# Patient Record
Sex: MALE | Race: WHITE | NOT HISPANIC OR LATINO | Employment: OTHER | URBAN - METROPOLITAN AREA
[De-identification: names, ages, dates, MRNs, and addresses within clinical notes are randomized per-mention and may not be internally consistent; named-entity substitution may affect disease eponyms.]

---

## 2017-01-03 ENCOUNTER — OFFICE VISIT (OUTPATIENT)
Dept: INTERNAL MEDICINE | Facility: CLINIC | Age: 72
End: 2017-01-03
Payer: MEDICARE

## 2017-01-03 VITALS
DIASTOLIC BLOOD PRESSURE: 68 MMHG | BODY MASS INDEX: 28.69 KG/M2 | HEIGHT: 73 IN | HEART RATE: 76 BPM | TEMPERATURE: 98 F | WEIGHT: 216.5 LBS | OXYGEN SATURATION: 98 % | SYSTOLIC BLOOD PRESSURE: 111 MMHG

## 2017-01-03 DIAGNOSIS — J40 BRONCHITIS: Primary | ICD-10-CM

## 2017-01-03 DIAGNOSIS — Z88.0 HISTORY OF PENICILLIN ALLERGY: ICD-10-CM

## 2017-01-03 DIAGNOSIS — F32.A DEPRESSION, UNSPECIFIED DEPRESSION TYPE: ICD-10-CM

## 2017-01-03 PROCEDURE — 99999 PR PBB SHADOW E&M-EST. PATIENT-LVL IV: CPT | Mod: PBBFAC,,, | Performed by: INTERNAL MEDICINE

## 2017-01-03 PROCEDURE — 99214 OFFICE O/P EST MOD 30 MIN: CPT | Mod: PBBFAC | Performed by: INTERNAL MEDICINE

## 2017-01-03 PROCEDURE — 99214 OFFICE O/P EST MOD 30 MIN: CPT | Mod: S$PBB,,, | Performed by: INTERNAL MEDICINE

## 2017-01-03 RX ORDER — DOXYCYCLINE HYCLATE 100 MG
100 TABLET ORAL 2 TIMES DAILY
Qty: 14 TABLET | Refills: 0 | Status: SHIPPED | OUTPATIENT
Start: 2017-01-03 | End: 2017-01-10

## 2017-01-03 NOTE — PROGRESS NOTES
Subjective:       Patient ID: Peter Caldwell is a 71 y.o. male.    Chief Complaint: URI    HPI  70 y/o here for urgent visit for sinus/cough.    URI - Symptoms started a little over a week ago with congestion, rhinorrhea, sore throat which progressed to productive cough (yellow/green sputum). Feeling fatigued. Had started feeling better 3-4 days ago, but now feeling worse again; feeling more easily winded with exertion but not otherwise short of breath. Subjective fever at onset but no definite fevers since then. Right-sided sinus pressure but not pain; no ear pain or hearing.   Using flonase twice daily. Taking guaifenesin-DM, mucinex, ibuprofen + pseudoephedrine, ibuprofen.     Does have HTN, checks BP at home. No chest pain or dyspnea at rest.    Feeling depressed some recently. Has struggled with this in the past; has seen a psychiatrist in the past and was started on a medication (years ago) but stopped this due to rash -- unclear if rash was incidental or due to medication. Doesn't recall which. Had been feeling good over holiday but has had work stress. Planning to start exercising more in the new year.    Also asks about history of penicillin allergy; says he used to get PCN shots occasionally as a child and once developed mild rash, and that he was then told he had an allergy and shouldn't get this again.     Review of Systems   Constitutional: Positive for fatigue. Negative for fever.   HENT:        As noted in HPI   Respiratory: Positive for cough. Negative for shortness of breath and wheezing.    Cardiovascular: Negative for chest pain and leg swelling.   Gastrointestinal: Negative for abdominal pain, diarrhea and nausea.   Genitourinary: Negative.    Musculoskeletal: Negative for joint swelling and myalgias.   Skin: Negative for rash.   Neurological: Negative for headaches.   Psychiatric/Behavioral: Positive for dysphoric mood. Negative for suicidal ideas. The patient is not nervous/anxious.       "    Past medical history, surgical history, and family medical history reviewed and updated as appropriate.    Medications and allergies reviewed.     Objective:          Vitals:    01/03/17 1634   BP: 111/68   BP Location: Left arm   Patient Position: Sitting   Pulse: 76   Temp: 97.7 °F (36.5 °C)   TempSrc: Oral   SpO2: 98%   Weight: 98.2 kg (216 lb 7.9 oz)   Height: 6' 1" (1.854 m)     Body mass index is 28.56 kg/(m^2).  Physical Exam   Constitutional: He is oriented to person, place, and time. He appears well-developed and well-nourished. No distress.   HENT:   Head: Normocephalic and atraumatic.   Right Ear: Tympanic membrane, external ear and ear canal normal.   Left Ear: Tympanic membrane, external ear and ear canal normal.   Nose: Mucosal edema (and erythema of nasal turbinates) present. Right sinus exhibits no maxillary sinus tenderness and no frontal sinus tenderness. Left sinus exhibits no maxillary sinus tenderness and no frontal sinus tenderness.   Mouth/Throat: Mucous membranes are normal. Posterior oropharyngeal erythema (mild erythema) present. No oropharyngeal exudate. No tonsillar exudate.   Eyes: Conjunctivae and EOM are normal. Pupils are equal, round, and reactive to light.   Neck: Normal range of motion. Neck supple.   Cardiovascular: Normal rate, regular rhythm and normal heart sounds.    No murmur heard.  Pulmonary/Chest: Effort normal and breath sounds normal. No respiratory distress. He has no wheezes.   Coarse breath sounds in mid-lung bilaterally but no clear rhonchi, wheezes, or rales. Good air movement throughout, no egophony   Abdominal: Soft. Bowel sounds are normal. He exhibits no distension. There is no tenderness.   Musculoskeletal: He exhibits no edema or tenderness.   Lymphadenopathy:     He has no cervical adenopathy.   Neurological: He is alert and oriented to person, place, and time. No cranial nerve deficit. Gait normal.   Skin: Skin is warm and dry.   Psychiatric: He has a " normal mood and affect.   Vitals reviewed.      Lab Results   Component Value Date    WBC 6.15 06/02/2009    HGB 15.7 06/02/2009    HCT 46.0 06/02/2009     06/02/2009    CHOL 121 02/08/2016    TRIG 77 02/08/2016    HDL 44 02/08/2016    ALT 22 07/13/2016    AST 19 07/13/2016     07/13/2016    K 4.5 07/13/2016     07/13/2016    CREATININE 1.1 07/13/2016    BUN 15 07/13/2016    CO2 25 07/13/2016    TSH 2.1 05/13/2005    PSA 4.3 (H) 07/13/2016    INR 1.0 06/02/2009       Assessment:       1. Bronchitis    2. History of penicillin allergy    3. Depression, unspecified depression type        Plan:   Peter was seen today for uri.    Diagnoses and all orders for this visit:    Bronchitis - given duration of symptoms with worsening after improvement and predominant productive cough but overall normal lung exam, will treat with antibiotics for acute bronchitis, but CXR not currently needed. Also Recommended: saline nasal spray BID-TID, steroid nasal spray BID x 1 week then daily, antihistamine daily, mucinex-DM with plenty of fluids, lots of hot tea / soup, lots of sleep. Contact clinic or RTC for worsening symptoms, or symptoms that have not improved significantly after course of antibiotics   -     doxycycline (VIBRA-TABS) 100 MG tablet; Take 1 tablet (100 mg total) by mouth 2 (two) times daily.    History of penicillin allergy - discussed options; given history, he would like to see Allergy for testing for PCN allergy  -     Ambulatory Referral to Allergy    Depression, unspecified depression type - patient education given for self-care and crisis line; recommended follow up with PCP on this or seek counseling support. He is looking into establishing care with new PCP as he has moved to [place] and wants to have a provider closer.     BP normal today; cautioned to watch BP carefully if taking NSAIDs or decongestants.    Return if symptoms worsen or fail to improve.    Jose Anderson MD  Internal  Medicine [facility]  1/3/2017

## 2017-01-03 NOTE — Clinical Note
I saw Peter Caldwell today for urgent visit for URI/bronchitis, also discussed depression and penicillin allergy - see note for details, please contact me if any questions.  Thanks, Jose Anderson MD

## 2017-01-03 NOTE — PATIENT INSTRUCTIONS
Acute Bronchitis  Your healthcare provider has told you that you have acute bronchitis. Bronchitis is infection or inflammation of the bronchial tubes (airways in the lungs). Normally, air moves easily in and out of the airways. Bronchitis narrows the airways, making it harder for air to flow in and out of the lungs. This causes symptoms such as shortness of breath, coughing, and wheezing. Bronchitis can be acute or chronic. Acute means the condition comes on quickly and goes away in a short time. Chronic means a condition lasts a long time and often comes back. Read on to learn more about acute bronchitis.    What causes acute bronchitis?  Acute bronchitis almost always starts as a viral respiratory infection, such as a cold or the flu. Certain factors make it more likely for a cold or flu to turn into bronchitis. These include being very young or very old or having a heart or lung problem. Cigarette smoking also makes bronchitis more likely.  When bronchitis develops, the airways become swollen. The airways may also become infected with bacteria. This is known as a secondary infection.  Diagnosing acute bronchitis  Your healthcare provider will examine you and ask about your symptoms and health history. You may also have a sputum culture to test the fluid in your lungs. Chest X-rays may be done to look for infection in the lungs.  Treating acute bronchitis  Bronchitis usually clears up as the cold or flu goes away. You can help feel better faster by doing the following:  · Take medicine as directed. You may be told to take ibuprofen or other over-the-counter medicines. These help relieve inflammation in your bronchial tubes. Your doctor may prescribe an inhaler to help open up the bronchial tubes. Most of the time, acute bronchitis is caused by a viral infection. Antibiotics are usually not prescribed for viral infections.  · Drink plenty of fluids, such as water, juice, or warm soup. Fluids loosen mucus so  that you can cough it up. This helps you breathe more easily. Fluids also prevent dehydration.  · Make sure you get plenty of rest.  · Do not smoke. Do not allow anyone else to smoke in your home.  Recovery and follow-up  Follow up with your doctor as you are told. You will likely feel better in a week or two. But a dry cough can linger beyond that time. Let your doctor know if you still have symptoms (other than a dry cough) after 2 weeks. If youre prone to getting bronchial infections, let your doctor know. And take steps to protect yourself from future infections. These steps include stopping smoking and avoiding tobacco smoke, washing your hands often, and getting a yearly flu shot.  When to call the doctor  Call the doctor if you have any of the following:  · Fever of 100.4°F (38.0°C) higher  · Symptoms that get worse, or new symptoms  · Trouble breathing  · Symptoms that dont start to improve within a week, or within 3 days of taking antibiotics   © 6292-3205 [employer]. [address] [place] [address]. All rights reserved. This information is not intended as a substitute for professional medical care. Always follow your healthcare professional's instructions.        Know the Signs and Symptoms of Depression  Everyone feels down at times. The blues are a natural part of life. But an unhappy period thats intense or lasts for more than a couple of weeks can be a sign of depression. Depression is a serious illness. It can disrupt the lives of family and friends. If you know someone you think may be depressed, find out what you can do to help.    Recognizing signs of depression  People who are depressed may:  · Feel unhappy, sad, blue, down, or miserable nearly every day  · Feel helpless, hopeless, or worthless  · Lose interest in hobbies, friends, and activities that used to give pleasure  · Not sleep well or sleep too much  · Gain or lose weight  · Feel low on energy or constantly  tired  · Have a hard time concentrating or making decisions  · Lose interest in sex  · Have physical symptoms, such as stomachaches, headaches, or backaches  Know the serious signals  Warning signals for suicide include:  · Threats or talk of suicide  · Statements such as I wont be a problem much longer or Nothing matters  · Giving away possessions or making a will or  arrangements  · Buying a gun or other weapon  · Sudden, unexplained cheerfulness or calm after a period of depression  If you notice any of these signs, get help right away. Call a health care professional, mental health clinic, or suicide hotline and ask what action to take. In an emergency, dont hesitate to call the police.  Resources:  · National Institutes of Mental Xeluqv213-951-1830mav.nimh.nih.gov  · National [place] on Mental Zqdqgvp266-926-1484uyc.edmond.org   · Mental Health Qlgpcet251-995-4423kii.nmha.org  · National Suicide Gmlijkq717-537-3405 (800-SUICIDE)  · National Suicide Prevention Onxriiin435-533-4288 (725-248-HSUD)www.wuicidepreventionlifeline.org   © 7719-6029 [employer]. [address] [place] [address], [place] [address]. All rights reserved. This information is not intended as a substitute for professional medical care. Always follow your healthcare professional's instructions.

## 2017-01-03 NOTE — MR AVS SNAPSHOT
[facility]  [address]  [facility] [address]  Phone: [phone]  Fax: [phone]                  Peter Caldwell   1/3/2017 4:20 PM   Office Visit    Description:  Male : 1945   Provider:  Jose Anderson MD   Department:  [facility]           Reason for Visit     URI           Diagnoses this Visit        Comments    Bronchitis    -  Primary     History of penicillin allergy         Depression, unspecified depression type                To Do List           Future Appointments        Provider Department Dept Phone    4/10/2017 8:45 AM [facility] 11 ALL [facility] [phone]      Goals (5 Years of Data)     None      Follow-Up and Disposition     Return if symptoms worsen or fail to improve.       These Medications        Disp Refills Start End    doxycycline (VIBRA-TABS) 100 MG tablet 14 tablet 0 1/3/2017 1/10/2017    Take 1 tablet (100 mg total) by mouth 2 (two) times daily. - Oral    [facility] [address] [place] [address] AT [facility] Ph #: [phone]         [facility]     [facility] -  Assistance  Registered nurses in the [facility] provide clinical advisement, health education, appointment booking, and other advisory services.  Call for this free service at [phone].             Medications           Message regarding Medications     Verify the changes and/or additions to your medication regime listed below are the same as discussed with your clinician today.  If any of these changes or additions are incorrect, please notify your healthcare provider.        START taking these NEW medications        Refills    doxycycline (VIBRA-TABS) 100 MG tablet 0    Sig: Take 1 tablet (100 mg total) by mouth 2 (two) times daily.    Class: Normal    Route: Oral           Verify that the below list of medications  "is an accurate representation of the medications you are currently taking.  If none reported, the list may be blank. If incorrect, please contact your healthcare provider. Carry this list with you in case of emergency.           Current Medications     aspirin (ECOTRIN) 81 MG EC tablet Take 81 mg by mouth every evening.     atorvastatin (LIPITOR) 80 MG tablet Take 80 mg by mouth every evening.    CALCIUM CITRATE/VITAMIN D3 (CALCIUM CITRATE + D ORAL) Take 1 tablet by mouth once daily.    fluticasone (FLONASE) 50 mcg/actuation nasal spray SHAKE WELL AND USE 1 SPRAY IN EACH NOSTRIL EVERY DAY    metoprolol succinate (TOPROL-XL) 50 MG 24 hr tablet Take 1 tablet (50 mg total) by mouth nightly.    metoprolol succinate (TOPROL-XL) 50 MG 24 hr tablet TAKE ONE TABLET BY MOUTH EVERY EVENING    MULTIVIT-MINERALS/FA/LYCOPENE (ONE-A-DAY MEN'S MULTIVITAMIN ORAL) Take by mouth.    tamsulosin (FLOMAX) 0.4 mg Cp24 TAKE 1 CAPSULE BY MOUTH EVERY DAY    doxycycline (VIBRA-TABS) 100 MG tablet Take 1 tablet (100 mg total) by mouth 2 (two) times daily.           Clinical Reference Information           Vital Signs - Last Recorded  Most recent update: 1/3/2017  4:40 PM by Tea Mata MA    BP Pulse Temp Ht Wt SpO2    111/68 (BP Location: Left arm, Patient Position: Sitting) 76 97.7 °F (36.5 °C) (Oral) 6' 1" (1.854 m) 98.2 kg (216 lb 7.9 oz) 98%    BMI                28.56 kg/m2          Blood Pressure          Most Recent Value    BP  111/68      Allergies as of 1/3/2017     Penicillins    Promethazine    Percodan [Oxycodone Hcl-oxycodone-asa]      Immunizations Administered on Date of Encounter - 1/3/2017     None      Orders Placed During Today's Visit      Normal Orders This Visit    Ambulatory Referral to Allergy       Instructions      Acute Bronchitis  Your healthcare provider has told you that you have acute bronchitis. Bronchitis is infection or inflammation of the bronchial tubes (airways in the lungs). Normally, air moves " easily in and out of the airways. Bronchitis narrows the airways, making it harder for air to flow in and out of the lungs. This causes symptoms such as shortness of breath, coughing, and wheezing. Bronchitis can be acute or chronic. Acute means the condition comes on quickly and goes away in a short time. Chronic means a condition lasts a long time and often comes back. Read on to learn more about acute bronchitis.    What causes acute bronchitis?  Acute bronchitis almost always starts as a viral respiratory infection, such as a cold or the flu. Certain factors make it more likely for a cold or flu to turn into bronchitis. These include being very young or very old or having a heart or lung problem. Cigarette smoking also makes bronchitis more likely.  When bronchitis develops, the airways become swollen. The airways may also become infected with bacteria. This is known as a secondary infection.  Diagnosing acute bronchitis  Your healthcare provider will examine you and ask about your symptoms and health history. You may also have a sputum culture to test the fluid in your lungs. Chest X-rays may be done to look for infection in the lungs.  Treating acute bronchitis  Bronchitis usually clears up as the cold or flu goes away. You can help feel better faster by doing the following:  · Take medicine as directed. You may be told to take ibuprofen or other over-the-counter medicines. These help relieve inflammation in your bronchial tubes. Your doctor may prescribe an inhaler to help open up the bronchial tubes. Most of the time, acute bronchitis is caused by a viral infection. Antibiotics are usually not prescribed for viral infections.  · Drink plenty of fluids, such as water, juice, or warm soup. Fluids loosen mucus so that you can cough it up. This helps you breathe more easily. Fluids also prevent dehydration.  · Make sure you get plenty of rest.  · Do not smoke. Do not allow anyone else to smoke in your  home.  Recovery and follow-up  Follow up with your doctor as you are told. You will likely feel better in a week or two. But a dry cough can linger beyond that time. Let your doctor know if you still have symptoms (other than a dry cough) after 2 weeks. If youre prone to getting bronchial infections, let your doctor know. And take steps to protect yourself from future infections. These steps include stopping smoking and avoiding tobacco smoke, washing your hands often, and getting a yearly flu shot.  When to call the doctor  Call the doctor if you have any of the following:  · Fever of 100.4°F (38.0°C) higher  · Symptoms that get worse, or new symptoms  · Trouble breathing  · Symptoms that dont start to improve within a week, or within 3 days of taking antibiotics   © 7515-9082 [employer]. [address] [place] [address]. All rights reserved. This information is not intended as a substitute for professional medical care. Always follow your healthcare professional's instructions.        Know the Signs and Symptoms of Depression  Everyone feels down at times. The blues are a natural part of life. But an unhappy period thats intense or lasts for more than a couple of weeks can be a sign of depression. Depression is a serious illness. It can disrupt the lives of family and friends. If you know someone you think may be depressed, find out what you can do to help.    Recognizing signs of depression  People who are depressed may:  · Feel unhappy, sad, blue, down, or miserable nearly every day  · Feel helpless, hopeless, or worthless  · Lose interest in hobbies, friends, and activities that used to give pleasure  · Not sleep well or sleep too much  · Gain or lose weight  · Feel low on energy or constantly tired  · Have a hard time concentrating or making decisions  · Lose interest in sex  · Have physical symptoms, such as stomachaches, headaches, or backaches  Know the serious signals  Warning  signals for suicide include:  · Threats or talk of suicide  · Statements such as I wont be a problem much longer or Nothing matters  · Giving away possessions or making a will or  arrangements  · Buying a gun or other weapon  · Sudden, unexplained cheerfulness or calm after a period of depression  If you notice any of these signs, get help right away. Call a health care professional, mental health clinic, or suicide hotline and ask what action to take. In an emergency, dont hesitate to call the police.  Resources:  · National Institutes of Mental Cpacqk599-643-0401krf.[facility].nih.gov  · National [place] on Mental Qqbcqqw303-756-2007aim.[place].org   · Mental Health Oefeuft592-977-4257lpu.nmha.org  · National Suicide Mslavaf404-972-2664 (800-SUICIDE)  · National Suicide Prevention Bvesraot408-478-6291 (486-705-ZWBI)www.Cold[employer] Solutionsicidepreventionlifeline.org   © 5165-8944 [employer]. [address] [place] [address], [place] [address]. All rights reserved. This information is not intended as a substitute for professional medical care. Always follow your healthcare professional's instructions.

## 2017-04-06 ENCOUNTER — TELEPHONE (OUTPATIENT)
Dept: UROLOGY | Facility: CLINIC | Age: 72
End: 2017-04-06

## 2017-04-06 DIAGNOSIS — R97.20 ELEVATED PSA: Primary | ICD-10-CM

## 2017-04-06 NOTE — TELEPHONE ENCOUNTER
----- Message from Armando Miranda sent at 4/6/2017  4:34 PM CDT -----  Contact: pt  Pt is requesting orders for PSA test.      Pt can be reach at [phone]. Or send to his [facility].

## 2017-04-06 NOTE — TELEPHONE ENCOUNTER
Left message to call back so I can advised him that I booked psa lab test for him on his appt date.

## 2017-04-18 ENCOUNTER — HOSPITAL ENCOUNTER (OUTPATIENT)
Dept: RADIOLOGY | Facility: HOSPITAL | Age: 72
Discharge: HOME | End: 2017-04-18
Attending: FAMILY MEDICINE
Payer: MEDICARE

## 2017-04-18 ENCOUNTER — OFFICE VISIT (OUTPATIENT)
Dept: UROLOGY | Facility: CLINIC | Age: 72
End: 2017-04-18
Payer: MEDICARE

## 2017-04-18 VITALS
DIASTOLIC BLOOD PRESSURE: 72 MMHG | SYSTOLIC BLOOD PRESSURE: 133 MMHG | WEIGHT: 216.5 LBS | HEIGHT: 73 IN | BODY MASS INDEX: 28.69 KG/M2 | HEART RATE: 50 BPM

## 2017-04-18 DIAGNOSIS — R97.20 ELEVATED PSA: Primary | ICD-10-CM

## 2017-04-18 DIAGNOSIS — I71.40 ABDOMINAL AORTIC ANEURYSM (AAA) WITHOUT RUPTURE: ICD-10-CM

## 2017-04-18 PROCEDURE — 99214 OFFICE O/P EST MOD 30 MIN: CPT | Mod: S$PBB,,, | Performed by: UROLOGY

## 2017-04-18 PROCEDURE — 76775 US EXAM ABDO BACK WALL LIM: CPT | Mod: 26,GC,, | Performed by: RADIOLOGY

## 2017-04-18 PROCEDURE — 99213 OFFICE O/P EST LOW 20 MIN: CPT | Mod: PBBFAC | Performed by: UROLOGY

## 2017-04-18 PROCEDURE — 99999 PR PBB SHADOW E&M-EST. PATIENT-LVL III: CPT | Mod: PBBFAC,,, | Performed by: UROLOGY

## 2017-04-18 NOTE — PROGRESS NOTES
Clinic Note  4/18/2017      Subjective:       Patient ID:  MIREYA is a 71 y.o. male being seen for an new visit.      Chief Complaint:   HPI     Peter Caldwell is a 70 y.o. male with a history of elevated PSA. Last saw Dr. Robles in 2012. History of BPH on Flomax. Consult from Dr. Cardoso.  No prior TRUS or biopsy. Semi-retired educator.  Negative family history    Lab Results   Component Value Date    PSA 4.3 (H) 07/13/2016    PSA 3.9 03/18/2015    PSA 4.35 (H) 03/12/2013    PSA 4.15 (H) 12/12/2012    PSA 2.93 09/10/2012    PSA 4.18 (H) 06/07/2012    PSA 3.1 05/17/2011    PSA 2.6 12/15/2009    PSA 2.0 02/04/2005    PSADIAG 5.0 (H) 04/18/2017    PSADIAG 5.6 (H) 10/28/2016    PSADIAG 3.2 01/21/2014        Past Medical History:   Diagnosis Date    AAA (abdominal aortic aneurysm)     BPH (benign prostatic hyperplasia)     CAD (coronary artery disease)     Cataract     Elevated PSA     dr robles    Ex-smoker     Hyperlipidemia     Hypertension     Mood disorder     chalasani    ELINA (obstructive sleep apnea)     Osteopenia 4/15 aylin 4/20    S/P CABG (coronary artery bypass graft) 8/9/2013    S/P PTCA (percutaneous transluminal coronary angioplasty) 8/9/2013     Family History   Problem Relation Age of Onset    Coronary artery disease Mother     Diabetes Mother     Heart disease Mother     Coronary artery disease Father     Heart disease Father     Heart disease Brother     Heart disease Brother     Amblyopia Neg Hx     Blindness Neg Hx     Cataracts Neg Hx     Glaucoma Neg Hx     Macular degeneration Neg Hx     Retinal detachment Neg Hx     Strabismus Neg Hx      Social History     Social History    Marital status:      Spouse name: N/A    Number of children: 2    Years of education: N/A     Occupational History    Not on file.     Social History Main Topics    Smoking status: Former Smoker    Smokeless tobacco: Never Used    Alcohol use 0.6 oz/week     1 Glasses of wine per week       Comment: maybe every 6 months    Drug use: No    Sexual activity: Not on file     Other Topics Concern    Not on file     Social History Narrative     Past Surgical History:   Procedure Laterality Date    CATARACT EXTRACTION W/  INTRAOCULAR LENS IMPLANT Left 07/26/2016    Dr. Norman    CATARACT EXTRACTION W/  INTRAOCULAR LENS IMPLANT Right 08/09/2016    Dr. Norman    CHOLECYSTECTOMY      CORONARY ANGIOPLASTY      CORONARY ARTERY BYPASS GRAFT      HERNIA REPAIR      ptca      rca     Patient Active Problem List   Diagnosis    Hypertension    CAD (coronary artery disease)    ELINA (obstructive sleep apnea)    Hyperlipidemia    BPH (benign prostatic hyperplasia)    Mood disorder    S/P CABG (coronary artery bypass graft)    S/P PTCA (percutaneous transluminal coronary angioplasty)    Osteopenia    AAA (abdominal aortic aneurysm)    Nuclear sclerosis of both eyes    Vitreous detachment of right eye    Refractive error    Senile nuclear sclerosis    Post-operative state    Nuclear sclerosis of right eye    Carbuncle of neck    Elevated PSA     Review of Systems   Constitutional: Negative for appetite change, chills, fatigue, fever and unexpected weight change.   HENT: Negative for nosebleeds.    Respiratory: Negative for shortness of breath and wheezing.    Cardiovascular: Negative for chest pain, palpitations and leg swelling.   Gastrointestinal: Negative for abdominal distention, abdominal pain, constipation, diarrhea, nausea and vomiting.   Genitourinary: Positive for nocturia. Negative for difficulty urinating, dysuria and hematuria.   Musculoskeletal: Negative for arthralgias and back pain.   Skin: Negative for pallor.   Neurological: Negative for dizziness, seizures and syncope.   Hematological: Negative for adenopathy.   Psychiatric/Behavioral: Negative for dysphoric mood.         Objective:      There were no vitals taken for this visit.  Estimated body mass index is 28.56  "kg/(m^2) as calculated from the following:    Height as of 1/3/17: 6' 1" (1.854 m).    Weight as of 1/3/17: 98.2 kg (216 lb 7.9 oz).  Physical Exam   Constitutional: He is oriented to person, place, and time. He appears well-developed and well-nourished. No distress.   HENT:   Head: Atraumatic.   Neck: No tracheal deviation present.   Cardiovascular: Normal rate.    Pulmonary/Chest: Effort normal. No respiratory distress. He has no wheezes.   Abdominal: Soft. Bowel sounds are normal. He exhibits no distension and no mass. There is no tenderness. There is no rebound and no guarding.   Genitourinary: Rectal exam shows internal hemorrhoid. Rectal exam shows no external hemorrhoid, no fissure, no mass and no tenderness. Prostate is enlarged.   Genitourinary Comments: 50 cc prostate   Neurological: He is alert and oriented to person, place, and time.   Skin: Skin is warm and dry. He is not diaphoretic.     Psychiatric: He has a normal mood and affect. His behavior is normal. Judgment and thought content normal.         Assessment and Plan:           Problem List Items Addressed This Visit     Elevated PSA - Primary          Follow up:   1 year with PSA.    Pawel Salinas      "

## 2017-04-18 NOTE — MR AVS SNAPSHOT
[facility]  [address]  [facility] [address]  Phone: [phone]                  Peter Caldwell   2017 3:30 PM   Office Visit    Description:  Male : 1945   Provider:  Pawel Salinas MD   Department:  [facility]           Diagnoses this Visit        Comments    Elevated PSA    -  Primary            To Do List           Goals (5 Years of Data)     None      Follow-Up and Disposition     Return in about 1 year (around 2018).      [facility]     [facility] -  Assistance  Unless otherwise directed by your provider, please contact [facility], our nurse care line that is available for  assistance.     Registered nurses in the [facility] provide: appointment scheduling, clinical advisement, health education, and other advisory services.  Call: [phone] (toll free)               Medications           Message regarding Medications     Verify the changes and/or additions to your medication regime listed below are the same as discussed with your clinician today.  If any of these changes or additions are incorrect, please notify your healthcare provider.             Verify that the below list of medications is an accurate representation of the medications you are currently taking.  If none reported, the list may be blank. If incorrect, please contact your healthcare provider. Carry this list with you in case of emergency.           Current Medications     aspirin (ECOTRIN) 81 MG EC tablet Take 81 mg by mouth every evening.     atorvastatin (LIPITOR) 80 MG tablet Take 80 mg by mouth every evening.    CALCIUM CITRATE/VITAMIN D3 (CALCIUM CITRATE + D ORAL) Take 1 tablet by mouth once daily.    fluticasone (FLONASE) 50 mcg/actuation nasal spray SHAKE WELL AND USE 1 SPRAY IN EACH NOSTRIL EVERY DAY    metoprolol succinate (TOPROL-XL) 50 MG 24 hr tablet Take 1 tablet (50 mg total) by mouth nightly.     "metoprolol succinate (TOPROL-XL) 50 MG 24 hr tablet TAKE ONE TABLET BY MOUTH EVERY EVENING    MULTIVIT-MINERALS/FA/LYCOPENE (ONE-A-DAY MEN'S MULTIVITAMIN ORAL) Take by mouth.    tamsulosin (FLOMAX) 0.4 mg Cp24 TAKE 1 CAPSULE BY MOUTH EVERY DAY           Clinical Reference Information           Your Vitals Were     BP Pulse Height Weight BMI    133/72 (BP Location: Right arm, Patient Position: Sitting, BP Method: Automatic) 50 6' 1" (1.854 m) 98.2 kg (216 lb 7.9 oz) 28.56 kg/m2      Blood Pressure          Most Recent Value    BP  133/72      Allergies as of 4/18/2017     Penicillins    Promethazine    Percodan [Oxycodone Hcl-oxycodone-asa]      Immunizations Administered on Date of Encounter - 4/18/2017     None      Language Assistance Services     ATTENTION: Language assistance services are available, free of charge. Please call [phone].      ATENCIÓN: Si habla ferny, tiene a macias disposición servicios gratuitos de asistencia lingüística. Llame al [phone].     MARC Ý: N?u b?n nói Ti?ng Vi?t, có các d?ch v? h? tr? ngôn ng? mi?n phí dành cho b?n. G?i s? [phone].         [facility] complies with applicable Federal civil rights laws and does not discriminate on the basis of race, color, national origin, age, disability, or sex.        "

## 2017-05-02 RX ORDER — FLUTICASONE PROPIONATE 50 MCG
SPRAY, SUSPENSION (ML) NASAL
Qty: 1 BOTTLE | Refills: 5 | Status: SHIPPED | OUTPATIENT
Start: 2017-05-02 | End: 2017-11-06 | Stop reason: SDUPTHER

## 2017-05-05 RX ORDER — ATORVASTATIN CALCIUM 80 MG/1
TABLET, FILM COATED ORAL
Qty: 90 TABLET | Refills: 3 | Status: SHIPPED | OUTPATIENT
Start: 2017-05-05 | End: 2018-04-30 | Stop reason: SDUPTHER

## 2017-05-09 RX ORDER — TAMSULOSIN HYDROCHLORIDE 0.4 MG/1
CAPSULE ORAL
Qty: 90 CAPSULE | Refills: 0 | Status: SHIPPED | OUTPATIENT
Start: 2017-05-09 | End: 2017-08-04 | Stop reason: SDUPTHER

## 2017-08-04 RX ORDER — TAMSULOSIN HYDROCHLORIDE 0.4 MG/1
CAPSULE ORAL
Qty: 90 CAPSULE | Refills: 0 | Status: SHIPPED | OUTPATIENT
Start: 2017-08-04 | End: 2017-11-02 | Stop reason: SDUPTHER

## 2017-08-08 ENCOUNTER — PATIENT MESSAGE (OUTPATIENT)
Dept: INTERNAL MEDICINE | Facility: CLINIC | Age: 72
End: 2017-08-08

## 2017-08-08 DIAGNOSIS — I25.10 CORONARY ARTERY DISEASE INVOLVING NATIVE CORONARY ARTERY OF NATIVE HEART WITHOUT ANGINA PECTORIS: Primary | ICD-10-CM

## 2017-08-11 ENCOUNTER — PATIENT MESSAGE (OUTPATIENT)
Dept: INTERNAL MEDICINE | Facility: CLINIC | Age: 72
End: 2017-08-11

## 2017-08-15 ENCOUNTER — LAB VISIT (OUTPATIENT)
Dept: LAB | Facility: HOSPITAL | Age: 72
End: 2017-08-15
Attending: FAMILY MEDICINE
Payer: MEDICARE

## 2017-08-15 DIAGNOSIS — I25.10 CORONARY ARTERY DISEASE INVOLVING NATIVE CORONARY ARTERY OF NATIVE HEART WITHOUT ANGINA PECTORIS: ICD-10-CM

## 2017-08-15 LAB
ALBUMIN SERPL BCP-MCNC: 3.8 G/DL
ALP SERPL-CCNC: 47 U/L
ALT SERPL W/O P-5'-P-CCNC: 16 U/L
ANION GAP SERPL CALC-SCNC: 11 MMOL/L
AST SERPL-CCNC: 18 U/L
BILIRUB SERPL-MCNC: 0.9 MG/DL
BUN SERPL-MCNC: 12 MG/DL
CALCIUM SERPL-MCNC: 9.3 MG/DL
CHLORIDE SERPL-SCNC: 107 MMOL/L
CHOLEST/HDLC SERPL: 3.2 {RATIO}
CO2 SERPL-SCNC: 24 MMOL/L
CREAT SERPL-MCNC: 1 MG/DL
EST. GFR  (AFRICAN AMERICAN): >60 ML/MIN/1.73 M^2
EST. GFR  (NON AFRICAN AMERICAN): >60 ML/MIN/1.73 M^2
GLUCOSE SERPL-MCNC: 96 MG/DL
HDL/CHOLESTEROL RATIO: 31.7 %
HDLC SERPL-MCNC: 142 MG/DL
HDLC SERPL-MCNC: 45 MG/DL
LDLC SERPL CALC-MCNC: 80.8 MG/DL
NONHDLC SERPL-MCNC: 97 MG/DL
POTASSIUM SERPL-SCNC: 4.6 MMOL/L
PROT SERPL-MCNC: 6.9 G/DL
SODIUM SERPL-SCNC: 142 MMOL/L
TRIGL SERPL-MCNC: 81 MG/DL

## 2017-08-15 PROCEDURE — 36415 COLL VENOUS BLD VENIPUNCTURE: CPT | Mod: PO

## 2017-08-15 PROCEDURE — 80061 LIPID PANEL: CPT

## 2017-08-15 PROCEDURE — 80053 COMPREHEN METABOLIC PANEL: CPT

## 2017-08-23 ENCOUNTER — OFFICE VISIT (OUTPATIENT)
Dept: INTERNAL MEDICINE | Facility: CLINIC | Age: 72
End: 2017-08-23
Payer: MEDICARE

## 2017-08-23 VITALS
DIASTOLIC BLOOD PRESSURE: 68 MMHG | HEIGHT: 73 IN | WEIGHT: 212.94 LBS | SYSTOLIC BLOOD PRESSURE: 116 MMHG | BODY MASS INDEX: 28.22 KG/M2 | HEART RATE: 60 BPM | OXYGEN SATURATION: 98 % | TEMPERATURE: 97 F

## 2017-08-23 DIAGNOSIS — I25.10 CORONARY ARTERY DISEASE INVOLVING NATIVE CORONARY ARTERY OF NATIVE HEART WITHOUT ANGINA PECTORIS: ICD-10-CM

## 2017-08-23 DIAGNOSIS — I71.40 ABDOMINAL AORTIC ANEURYSM (AAA) WITHOUT RUPTURE: Primary | ICD-10-CM

## 2017-08-23 DIAGNOSIS — I10 ESSENTIAL HYPERTENSION: ICD-10-CM

## 2017-08-23 PROCEDURE — 99213 OFFICE O/P EST LOW 20 MIN: CPT | Mod: PBBFAC,PO | Performed by: FAMILY MEDICINE

## 2017-08-23 PROCEDURE — 99214 OFFICE O/P EST MOD 30 MIN: CPT | Mod: S$PBB,,, | Performed by: FAMILY MEDICINE

## 2017-08-23 PROCEDURE — 1159F MED LIST DOCD IN RCRD: CPT | Mod: ,,, | Performed by: FAMILY MEDICINE

## 2017-08-23 PROCEDURE — 3074F SYST BP LT 130 MM HG: CPT | Mod: ,,, | Performed by: FAMILY MEDICINE

## 2017-08-23 PROCEDURE — 99999 PR PBB SHADOW E&M-EST. PATIENT-LVL III: CPT | Mod: PBBFAC,,, | Performed by: FAMILY MEDICINE

## 2017-08-23 PROCEDURE — 1125F AMNT PAIN NOTED PAIN PRSNT: CPT | Mod: ,,, | Performed by: FAMILY MEDICINE

## 2017-08-23 PROCEDURE — 3078F DIAST BP <80 MM HG: CPT | Mod: ,,, | Performed by: FAMILY MEDICINE

## 2017-08-23 NOTE — PATIENT INSTRUCTIONS
Exercises for Shoulder Flexibility: External Rotation  This stretch can help restore shoulder flexibility and relieve pain over time. When stretching, be sure to breathe deeply. Follow any special instructions from your doctor or physical therapist:  1.  a doorway. Grasp the doorjamb with the hand on the frozen side. Your arm should be bent.  2. With the other hand, hold the elbow on the frozen side firmly against your body.  3. Standing in the same spot, rotate your body away from the doorjamb. Stop when you feel the stretch in the shoulder. At first, try to hold the stretch for 5 seconds.  4. Work up to doing 3 sets of this stretch, 3 times a day. Work up to holding the stretch for 30 to 60 seconds.  Note: Keep your arms as still as you can. Over time, rotate your body a little more to enhance the stretch. But be careful not to twist your back.  Frozen shoulder  Frozen shoulder is another name for adhesive capsulitis, which causes restricted movement in the shoulder. If you have frozen shoulder, this stretch may cause discomfort, especially when you first get started. A few months may pass before you achieve the results you want. But once your shoulder heals, it rarely becomes frozen again. So stick to your stretching program. If you have any questions, be sure to ask your doctor.   Date Last Reviewed: 8/16/2015 © 2000-2016 [employer]. [address] [place] [address], [place] [address]. All rights reserved. This information is not intended as a substitute for professional medical care. Always follow your healthcare professional's instructions.        Exercises for Shoulder Flexibility: Internal Rotation    This stretch can help restore shoulder flexibility and relieve pain over time. When stretching, be sure to breathe deeply. Follow any special instructions from your healthcare provider or physical therapist.  5. While seated, move the arm on the side you want to stretch toward the middle of your  back. The palm of your hand should face out.  6. Cup your other hand under the hand thats behind your back. Gently push your cupped hand upward until you feel the stretch in the shoulder. Try to hold the stretch for 5 seconds.  7. Work up to doing 3 sets of this stretch, 3 times a day. Work up to holding the stretch for 30 to 60 seconds.  Note: Keep your back straight. Its OK if your hand cant reach the middle of your back. Instead, start the stretch with your hand as close as you can get it to the middle of your back.     Frozen shoulder  Frozen shoulder is another name for adhesive capsulitis. This causes restricted movement in the shoulder. If you have frozen shoulder, this stretch may cause discomfort, especially when you first get started. A few months may pass before you achieve the results you want. But once your shoulder heals, it rarely becomes frozen again. So stick to your stretching program. If you have any questions, be sure to ask your healthcare provider.   Date Last Reviewed: 10/14/2015  © 1872-3118 [employer]. [address] [place] [address]. All rights reserved. This information is not intended as a substitute for professional medical care. Always follow your healthcare professional's instructions.        Exercises for Shoulder Flexibility: Back Scratch  Improving your flexibility can reduce pain. Stretching exercises also can help increase your range of pain-free motion. Breathe normally when you exercise. Try to use smooth, fluid movements. Never force a stretch.  Note: Follow any special instructions you are given. If you feel pain, stop the exercise. If the pain continues after stopping, call your healthcare provider:  · Stand straight, placing the back of your hand on the side you want to stretch flat against your lower back.  · Throw one end of a towel over your shoulder. Grab it behind your back with your other hand.  · Pull down gently on the towel with your  front arm. Let your back arm slide up as high as is comfortable. Youll feel a stretch in your shoulder. Hold the stretch for a few seconds.  · Repeat 3 to 5 times. Build up to holding each stretch for 30 to 60 seconds.  For your safety, check with your healthcare provider before starting an exercise program.   Date Last Reviewed: 8/26/2015  © 3271-1285 [employer]. [address] [place] [address]. All rights reserved. This information is not intended as a substitute for professional medical care. Always follow your healthcare professional's instructions.        Exercises for Shoulder Flexibility: Wall Walk  Improving your flexibility can reduce pain. Stretching exercises also can help increase your range of pain-free motion. Breathe normally when you exercise. Use smooth, fluid movements.  Note: Follow any special instructions you are given. If you feel pain, stop the exercise. If the pain continues after stopping, call your healthcare provider:  · Stand with your shoulder about 2 feet from the wall.  · Raise your arm to shoulder level and gently walk your fingers up the wall as high as you can.  · Hold for a few seconds. Then walk your fingers back down.  · Repeat 3 times. Move closer to the wall as you repeat.  · Build up to holding each stretch for 30 seconds.  Caution: Do this stretch only if your healthcare provider recommends it. Dont do it when you are first injured.          Date Last Reviewed: 8/16/2015  © 1849-5656 [employer]. [address] [place] [address]. All rights reserved. This information is not intended as a substitute for professional medical care. Always follow your healthcare professional's instructions.

## 2017-09-05 NOTE — PROGRESS NOTES
Subjective:       Patient ID: Peter Caldwell is a male.    Chief Complaint: Multiple issues see below    HPI Hypertension: blood pressures at home normal. Tolerating medicine.   Coronary artery disease: up to date with cardiology. No chest pain, palpitations or shortness of breath. Tolerating medication.   osteopen 2015 dexa  Hypercholesterolemia: . Tolerating medicine.switching to lipitor  recurr ing hernia: 3 surg for in past. Asympt x occas discomfort w lifting w recent move. He has defers reeval/tx  bph on flomax  Return left should pain intermitt not daily;no trauma    Has moved [facility] and has place in [place]        Some anxiety irrit about politics/alf. No si     Past Medical History   Diagnosis Date    Hypertension     CAD (coronary artery disease)     ELINA (obstructive sleep apnea)     Hyperlipidemia     BPH (benign prostatic hyperplasia)     Elevated PSA      dr robles    Osteopenia 4/15 aylin 4/20    Mood disorder      chalasani    Ex-smoker     AAA (abdominal aortic aneurysm)     S/P CABG (coronary artery bypass graft) 8/9/2013    S/P PTCA (percutaneous transluminal coronary angioplasty) 8/9/2013    Cataract      Past Surgical History   Procedure Laterality Date    Coronary artery bypass graft      Ptca       rca    Hernia repair      Cholecystectomy      Coronary angioplasty       Family History   Problem Relation Age of Onset    Coronary artery disease Mother     Diabetes Mother     Heart disease Mother     Coronary artery disease Father     Heart disease Father     Heart disease Brother     Heart disease Brother      History     Social History    Marital Status:      Spouse Name: N/A     Number of Children: 2    Years of Education: N/A     Social History Main Topics    Smoking status: Former Smoker    Smokeless tobacco: Never Used    Alcohol Use: 0.6 oz/week     1 Glasses of wine per week      Comment: maybe every 6 months    Drug Use: No     Sexual Activity: None     Other Topics Concern    None     Social History Narrative       Review of Systems  no si  No cp no sob  Objective:      Physical Exam   Constitutional: He appears well-developed and well-nourished.   HENT:   Head: Normocephalic and atraumatic.   Right Ear: External ear normal.   Left Ear: External ear normal.   Nose: Nose normal.   Mouth/Throat: Oropharynx is clear and moist.   Nl tms   Eyes: Conjunctivae and EOM are normal. Pupils are equal, round, and reactive to light. No scleral icterus.   Neck: Normal range of motion. Neck supple. Carotid bruit is not present.   Cardiovascular: Normal rate, regular rhythm and normal heart sounds.  Exam reveals no gallop and no friction rub.    No murmur heard.  Pulmonary/Chest: Effort normal and breath sounds normal. He has no wheezes.   Abdominal: Soft. Bowel sounds are normal. He exhibits no distension and no mass. There is no hepatosplenomegaly. There is no tenderness. There is no rebound and no guarding.   Musculoskeletal: Normal range of motion. He exhibits no edema or tenderness.   Lymphadenopathy:     He has no cervical adenopathy.   Neurological: He is alert. He has normal reflexes. No cranial nerve deficit. Coordination normal.   Skin: Skin is warm and dry. No rash noted. No erythema.   Psychiatric: He has a normal mood and affect. His behavior is normal. Judgment and thought content normal.   Nursing note and vitals reviewed.    Gu: moderate size rih nonreducible  Left should exam nl no pain  Assessment:       1. Hypertension    2. CAD (coronary artery disease)    3. Hyperlipidemia    4. Osteopenia    5. Screening for prostate cancer      AAA  bph  Anxiety  Ing hernia;r  Plan:    #counselors  States had prvnar at pharm  Should exerc notify if no help consider PT  F/u card annually  F/u one year  Aortu/s when due   F/u dr duran  Lab and u/s ([facility])  Abdominal aortic aneurysm (AAA) without rupture  -     US Abdominal Aorta;  Future; Expected date: 04/19/2018    Coronary artery disease involving native coronary artery of native heart without angina pectoris    Essential hypertension  -     Lipid panel; Future; Expected date: 08/23/2018  -     Comprehensive metabolic panel; Future; Expected date: 08/23/2018

## 2017-10-02 ENCOUNTER — PATIENT MESSAGE (OUTPATIENT)
Dept: UROLOGY | Facility: CLINIC | Age: 72
End: 2017-10-02

## 2017-11-02 RX ORDER — TAMSULOSIN HYDROCHLORIDE 0.4 MG/1
CAPSULE ORAL
Qty: 90 CAPSULE | Refills: 0 | Status: SHIPPED | OUTPATIENT
Start: 2017-11-02 | End: 2018-01-29 | Stop reason: SDUPTHER

## 2017-11-07 RX ORDER — FLUTICASONE PROPIONATE 50 MCG
SPRAY, SUSPENSION (ML) NASAL
Qty: 1 BOTTLE | Refills: 5 | Status: SHIPPED | OUTPATIENT
Start: 2017-11-07 | End: 2018-05-01 | Stop reason: SDUPTHER

## 2018-01-30 RX ORDER — TAMSULOSIN HYDROCHLORIDE 0.4 MG/1
CAPSULE ORAL
Qty: 90 CAPSULE | Refills: 3 | Status: SHIPPED | OUTPATIENT
Start: 2018-01-30 | End: 2019-01-29 | Stop reason: SDUPTHER

## 2018-02-03 DIAGNOSIS — I25.10 CAD (CORONARY ARTERY DISEASE): ICD-10-CM

## 2018-02-03 RX ORDER — METOPROLOL SUCCINATE 50 MG/1
TABLET, EXTENDED RELEASE ORAL
Qty: 90 TABLET | Refills: 6 | Status: SHIPPED | OUTPATIENT
Start: 2018-02-03 | End: 2019-02-10 | Stop reason: SDUPTHER

## 2018-04-30 RX ORDER — ATORVASTATIN CALCIUM 80 MG/1
TABLET, FILM COATED ORAL
Qty: 90 TABLET | Refills: 4 | Status: SHIPPED | OUTPATIENT
Start: 2018-04-30 | End: 2019-07-10 | Stop reason: SDUPTHER

## 2018-05-02 RX ORDER — FLUTICASONE PROPIONATE 50 MCG
SPRAY, SUSPENSION (ML) NASAL
Qty: 1 BOTTLE | Refills: 11 | Status: SHIPPED | OUTPATIENT
Start: 2018-05-02 | End: 2019-05-04 | Stop reason: SDUPTHER

## 2018-08-06 ENCOUNTER — LAB VISIT (OUTPATIENT)
Dept: LAB | Facility: HOSPITAL | Age: 73
End: 2018-08-06
Attending: FAMILY MEDICINE
Payer: MEDICARE

## 2018-08-06 DIAGNOSIS — I10 ESSENTIAL HYPERTENSION: ICD-10-CM

## 2018-08-06 LAB
ALBUMIN SERPL BCP-MCNC: 3.9 G/DL
ALP SERPL-CCNC: 56 U/L
ALT SERPL W/O P-5'-P-CCNC: 24 U/L
ANION GAP SERPL CALC-SCNC: 9 MMOL/L
AST SERPL-CCNC: 21 U/L
BILIRUB SERPL-MCNC: 1 MG/DL
BUN SERPL-MCNC: 10 MG/DL
CALCIUM SERPL-MCNC: 9.4 MG/DL
CHLORIDE SERPL-SCNC: 107 MMOL/L
CHOLEST SERPL-MCNC: 122 MG/DL
CHOLEST/HDLC SERPL: 3 {RATIO}
CO2 SERPL-SCNC: 26 MMOL/L
CREAT SERPL-MCNC: 1 MG/DL
EST. GFR  (AFRICAN AMERICAN): >60 ML/MIN/1.73 M^2
EST. GFR  (NON AFRICAN AMERICAN): >60 ML/MIN/1.73 M^2
GLUCOSE SERPL-MCNC: 105 MG/DL
HDLC SERPL-MCNC: 41 MG/DL
HDLC SERPL: 33.6 %
LDLC SERPL CALC-MCNC: 60.6 MG/DL
NONHDLC SERPL-MCNC: 81 MG/DL
POTASSIUM SERPL-SCNC: 4.7 MMOL/L
PROT SERPL-MCNC: 6.8 G/DL
SODIUM SERPL-SCNC: 142 MMOL/L
TRIGL SERPL-MCNC: 102 MG/DL

## 2018-08-06 PROCEDURE — 36415 COLL VENOUS BLD VENIPUNCTURE: CPT

## 2018-08-06 PROCEDURE — 80053 COMPREHEN METABOLIC PANEL: CPT

## 2018-08-06 PROCEDURE — 80061 LIPID PANEL: CPT

## 2018-08-07 ENCOUNTER — HOSPITAL ENCOUNTER (OUTPATIENT)
Dept: RADIOLOGY | Facility: HOSPITAL | Age: 73
Discharge: HOME | End: 2018-08-07
Attending: FAMILY MEDICINE
Payer: MEDICARE

## 2018-08-07 DIAGNOSIS — I71.40 ABDOMINAL AORTIC ANEURYSM (AAA) WITHOUT RUPTURE: ICD-10-CM

## 2018-08-07 PROCEDURE — 76775 US EXAM ABDO BACK WALL LIM: CPT | Mod: 26,,, | Performed by: RADIOLOGY

## 2018-08-07 PROCEDURE — 76775 US EXAM ABDO BACK WALL LIM: CPT | Mod: TC

## 2018-10-22 ENCOUNTER — PATIENT MESSAGE (OUTPATIENT)
Dept: ADMINISTRATIVE | Facility: HOSPITAL | Age: 73
End: 2018-10-22

## 2018-12-21 ENCOUNTER — PES CALL (OUTPATIENT)
Dept: ADMINISTRATIVE | Facility: CLINIC | Age: 73
End: 2018-12-21

## 2019-01-07 ENCOUNTER — PATIENT MESSAGE (OUTPATIENT)
Dept: ADMINISTRATIVE | Facility: OTHER | Age: 74
End: 2019-01-07

## 2019-01-07 ENCOUNTER — LAB VISIT (OUTPATIENT)
Dept: LAB | Facility: HOSPITAL | Age: 74
End: 2019-01-07
Attending: FAMILY MEDICINE
Payer: MEDICARE

## 2019-01-07 ENCOUNTER — OFFICE VISIT (OUTPATIENT)
Dept: INTERNAL MEDICINE | Facility: CLINIC | Age: 74
End: 2019-01-07
Payer: MEDICARE

## 2019-01-07 VITALS
DIASTOLIC BLOOD PRESSURE: 60 MMHG | TEMPERATURE: 98 F | OXYGEN SATURATION: 97 % | BODY MASS INDEX: 27.47 KG/M2 | HEIGHT: 73 IN | SYSTOLIC BLOOD PRESSURE: 98 MMHG | HEART RATE: 58 BPM | WEIGHT: 207.25 LBS

## 2019-01-07 VITALS
SYSTOLIC BLOOD PRESSURE: 108 MMHG | TEMPERATURE: 98 F | HEIGHT: 73 IN | BODY MASS INDEX: 27.82 KG/M2 | WEIGHT: 209.88 LBS | HEART RATE: 54 BPM | OXYGEN SATURATION: 98 % | DIASTOLIC BLOOD PRESSURE: 62 MMHG

## 2019-01-07 DIAGNOSIS — I10 ESSENTIAL HYPERTENSION: ICD-10-CM

## 2019-01-07 DIAGNOSIS — I25.10 CORONARY ARTERY DISEASE INVOLVING NATIVE CORONARY ARTERY OF NATIVE HEART WITHOUT ANGINA PECTORIS: ICD-10-CM

## 2019-01-07 DIAGNOSIS — F39 MOOD DISORDER: ICD-10-CM

## 2019-01-07 DIAGNOSIS — R97.20 ELEVATED PSA: ICD-10-CM

## 2019-01-07 DIAGNOSIS — G47.09 OTHER INSOMNIA: ICD-10-CM

## 2019-01-07 DIAGNOSIS — E78.5 HYPERLIPIDEMIA, UNSPECIFIED HYPERLIPIDEMIA TYPE: ICD-10-CM

## 2019-01-07 DIAGNOSIS — Z00.00 ENCOUNTER FOR PREVENTIVE HEALTH EXAMINATION: Primary | ICD-10-CM

## 2019-01-07 DIAGNOSIS — Z98.61 S/P PTCA (PERCUTANEOUS TRANSLUMINAL CORONARY ANGIOPLASTY): ICD-10-CM

## 2019-01-07 DIAGNOSIS — I71.40 ABDOMINAL AORTIC ANEURYSM (AAA) WITHOUT RUPTURE: ICD-10-CM

## 2019-01-07 DIAGNOSIS — F43.21 SITUATIONAL DEPRESSION: ICD-10-CM

## 2019-01-07 DIAGNOSIS — N40.0 BENIGN PROSTATIC HYPERPLASIA WITHOUT LOWER URINARY TRACT SYMPTOMS: ICD-10-CM

## 2019-01-07 DIAGNOSIS — R41.3 SHORT-TERM MEMORY LOSS: ICD-10-CM

## 2019-01-07 DIAGNOSIS — G47.33 OSA (OBSTRUCTIVE SLEEP APNEA): ICD-10-CM

## 2019-01-07 DIAGNOSIS — I10 ESSENTIAL HYPERTENSION: Primary | ICD-10-CM

## 2019-01-07 DIAGNOSIS — M25.551 RIGHT HIP PAIN: ICD-10-CM

## 2019-01-07 DIAGNOSIS — M85.80 OSTEOPENIA, UNSPECIFIED LOCATION: ICD-10-CM

## 2019-01-07 DIAGNOSIS — Z95.1 S/P CABG (CORONARY ARTERY BYPASS GRAFT): ICD-10-CM

## 2019-01-07 DIAGNOSIS — H91.93 DECREASED HEARING OF BOTH EARS: ICD-10-CM

## 2019-01-07 PROCEDURE — 99214 OFFICE O/P EST MOD 30 MIN: CPT | Mod: S$PBB,,, | Performed by: FAMILY MEDICINE

## 2019-01-07 PROCEDURE — 99999 PR PBB SHADOW E&M-EST. PATIENT-LVL IV: CPT | Mod: PBBFAC,,, | Performed by: FAMILY MEDICINE

## 2019-01-07 PROCEDURE — G0439 PPPS, SUBSEQ VISIT: HCPCS | Mod: ,,, | Performed by: NURSE PRACTITIONER

## 2019-01-07 PROCEDURE — 99999 PR PBB SHADOW E&M-EST. PATIENT-LVL V: ICD-10-PCS | Mod: PBBFAC,,, | Performed by: NURSE PRACTITIONER

## 2019-01-07 PROCEDURE — 99214 PR OFFICE/OUTPT VISIT, EST, LEVL IV, 30-39 MIN: ICD-10-PCS | Mod: S$PBB,,, | Performed by: FAMILY MEDICINE

## 2019-01-07 PROCEDURE — 99214 OFFICE O/P EST MOD 30 MIN: CPT | Mod: PBBFAC,27,PO | Performed by: FAMILY MEDICINE

## 2019-01-07 PROCEDURE — G0439 PR MEDICARE ANNUAL WELLNESS SUBSEQUENT VISIT: ICD-10-PCS | Mod: ,,, | Performed by: NURSE PRACTITIONER

## 2019-01-07 PROCEDURE — 99999 PR PBB SHADOW E&M-EST. PATIENT-LVL IV: ICD-10-PCS | Mod: PBBFAC,,, | Performed by: FAMILY MEDICINE

## 2019-01-07 PROCEDURE — 99215 OFFICE O/P EST HI 40 MIN: CPT | Mod: PBBFAC,PO | Performed by: NURSE PRACTITIONER

## 2019-01-07 PROCEDURE — 99999 PR PBB SHADOW E&M-EST. PATIENT-LVL V: CPT | Mod: PBBFAC,,, | Performed by: NURSE PRACTITIONER

## 2019-01-07 RX ORDER — IBUPROFEN 200 MG
200 TABLET ORAL
COMMUNITY

## 2019-01-07 NOTE — PATIENT INSTRUCTIONS
Counseling and Referral of Other Preventative  (Italic type indicates deductible and co-insurance are waived)    Patient Name: Peter Caldwell  Today's Date: 1/7/2019    Health Maintenance       Date Due Completion Date    Pneumococcal Vaccine (65+ Low/Medium Risk) (1 of 2 - PCV13) 12/22/2010 12/22/2009    TETANUS VACCINE 07/17/2013 7/17/2003    PROSTATE-SPECIFIC ANTIGEN 04/18/2018 4/18/2017    Override on 3/12/2013: Done (DR Barroso Urologist)    Colonoscopy 03/07/2019 3/7/2014    High Dose Statin 04/30/2019 4/30/2018    Lipid Panel 08/06/2019 8/6/2018    DEXA SCAN 03/19/2020 3/19/2015    Override on 3/16/2010: Done (osteopenia; low fx risk; repat in 3 years per report)        No orders of the defined types were placed in this encounter.    The following information is provided to all patients.  This information is to help you find resources for any of the problems found today that may be affecting your health:                Living healthy guide: www.[facility].[place].gov      Understanding Diabetes: www.diabetes.org      Eating healthy: www.cdc.gov/healthyweight      CDC home safety checklist: www.cdc.gov/steadi/patient.html      Agency on Aging: www.goea.[place].gov      Alcoholics anonymous (AA): www.aa.org      Physical Activity: www.chelle.nih.gov/xk5wmui      Tobacco use: www.quitwithusla.org

## 2019-01-07 NOTE — PROGRESS NOTES
"Peter Caldwell presented for a  Medicare AWV and comprehensive Health Risk Assessment today. The following components were reviewed and updated:    · Medical history  · Family History  · Social history  · Allergies and Current Medications  · Health Risk Assessment  · Health Maintenance  · Care Team     ** See Completed Assessments for Annual Wellness Visit within the encounter summary.**       The following assessments were completed:  · Living Situation  · CAGE  · Depression Screening  · Timed Get Up and Go  · Whisper Test  · Cognitive Function Screening  · Nutrition Screening  · ADL Screening  · PAQ Screening    Vitals:    01/07/19 0912   BP: 108/62   BP Location: Right arm   Patient Position: Sitting   BP Method: Medium (Manual)   Pulse: (!) 54   Temp: 98 °F (36.7 °C)   TempSrc: Oral   SpO2: 98%   Weight: 95.2 kg (209 lb 14.1 oz)   Height: 6' 1" (1.854 m)     Body mass index is 27.69 kg/m².  Physical Exam   Constitutional: He is oriented to person, place, and time.   HENT:   Head: Normocephalic and atraumatic.   Right Ear: Tympanic membrane normal.   Left Ear: Tympanic membrane normal.   Eyes: Conjunctivae and EOM are normal.   Neck: Normal range of motion. Neck supple.   Cardiovascular: Normal rate, regular rhythm, normal heart sounds and intact distal pulses.   Pulmonary/Chest: Effort normal and breath sounds normal.   Abdominal: Soft. Bowel sounds are normal.   Musculoskeletal: Normal range of motion.        Right hip: He exhibits tenderness.   Neurological: He is alert and oriented to person, place, and time.   Skin: Skin is warm and dry.         Diagnoses and health risks identified today and associated recommendations/orders:    1. Encounter for preventive health examination  Completed today    2. Abdominal aortic aneurysm (AAA) without rupture  8/2018-- Stable aneurysmal dilatation of the distal abdominal aorta, unchanged from ultrasound study of 02/08/2016. Stable. Recheck scheduled for 2020.     3. " Hyperlipidemia, unspecified hyperlipidemia type  Pt on lipitor. No SE from medication. Stable. Continue follow up with PCP    Component      Latest Ref Rng & Units 8/6/2018   Cholesterol      120 - 199 mg/dL 122   Triglycerides      30 - 150 mg/dL 102   HDL      40 - 75 mg/dL 41   LDL Cholesterol      63.0 - 159.0 mg/dL 60.6 (L)   HDL/Chol Ratio      20.0 - 50.0 % 33.6   Total Cholesterol/HDL Ratio      2.0 - 5.0 3.0   Non-HDL Cholesterol      mg/dL 81     4. Essential hypertension  Stable on metoprolol. No acute cardiac or focal neuro issues. Follow up with PCP/cards.    5. Coronary artery disease involving native coronary artery of native heart without angina pectoris S/P CABG (coronary artery bypass graft)/ S/P PTCA (percutaneous transluminal coronary angioplasty)  Stable. Importance of controlled BP, lipids discussed to prevent further arterial wall build up. Follow up with cards upcoming.    6 Benign prostatic hyperplasia without lower urinary tract symptoms  Stable. Was seeing Dr. Salinas in urology. PSA elevated a year ago. Saw PCP today will have it rechecked- if elevated plans to reschedule with Brad. Uses flomax with no issues.    9. Osteopenia, unspecified location  Per Dexa in 2015, repeat recommended in 2020. Pt denies falls/fractures.    10. ELINA (obstructive sleep apnea)  Pt reports that he has had sleep apnea dxed in the past, not interested in CPAP. Thinks he snores, but is not sure. Reports unrestful sleep. Not interested in repeated sleep study as this time. States that he will let us know when he is.    11. Short-term memory loss  This is a new problem added today. Pt reports greatly intact long term memory, but decreasing short term memory. [facility]    12. Decreased hearing of both ears  This is a new problem added today. Pt reports that he believes this is secondary to being a /noise exposure. Has had audiology testing in the past. Reports that he  would [facility]    13. Other insomnia  Pt reports restful naps during the day, but unrestful sleep at night. Reports vivid dreams. PCP suggested melatonin trial. Follow up as scheduled    14. Right hip pain  New problem added today. Off and on x 1 yr or longer. Uses ibuprofen, ice, stretching. Helps somewhat. Not interested in further workup just yet. Would like to get on set schedule for conservative measures he will follow up PRN    15. Situational depression  This is a new problem added today. He seems to think that this problem is directly correlated with hearing loss. He is not interested in pursuing medical treatment for this problem at this time. He denies thoughts of harming himself or others. [facility] and if receiving hearing aids does not fix the problem, he plans to pursue medicinal treatment then. Follow up with PCP      Provided Peter with a 5-10 year written screening schedule and personal prevention plan. Recommendations were developed using the USPSTF age appropriate recommendations. Education, counseling, and referrals were provided as needed. After Visit Summary printed and given to patient which includes a list of additional screenings\tests needed.    Follow up with PCP and specialists as scheduled  New labs/tests ordered today: na  Upcoming health maintenance scheduled: na  HRA follow up in 1 year    Ramona Hall NP

## 2019-01-07 NOTE — PATIENT INSTRUCTIONS
Vitamin D daily 800 units by mouth  Follow up with Dr Norman-eye dr  Shingrix new shingles vaccine  via a pharmacy  Melatonin for sleep

## 2019-01-09 ENCOUNTER — PATIENT MESSAGE (OUTPATIENT)
Dept: INTERNAL MEDICINE | Facility: CLINIC | Age: 74
End: 2019-01-09

## 2019-01-09 DIAGNOSIS — R97.20 ELEVATED PSA: Primary | ICD-10-CM

## 2019-01-09 NOTE — TELEPHONE ENCOUNTER
Needs, diagnostic psa to be drawn soon at the [facility]. Please let him know when    He tried doing it here yesterday but they were unable to draw    Also want to make sure diagnostic psa also ordered with next years lab at [facility]

## 2019-01-10 ENCOUNTER — TELEPHONE (OUTPATIENT)
Dept: INTERNAL MEDICINE | Facility: CLINIC | Age: 74
End: 2019-01-10

## 2019-01-10 NOTE — TELEPHONE ENCOUNTER
S/w pt and sched for Nonfasting lab at [place] location 01/11/19 same day as wife's appt, pt confirmed appt date/time.

## 2019-01-10 NOTE — TELEPHONE ENCOUNTER
----- Message from Lilian Smith sent at 1/10/2019  3:32 PM CST -----  Patient would like to come in to do lab work tomorrow, was not able to do lab work 01/07, when orders are in please call patient back..[phone]

## 2019-01-11 ENCOUNTER — PATIENT OUTREACH (OUTPATIENT)
Dept: OTHER | Facility: OTHER | Age: 74
End: 2019-01-11

## 2019-01-11 ENCOUNTER — LAB VISIT (OUTPATIENT)
Dept: LAB | Facility: HOSPITAL | Age: 74
End: 2019-01-11
Attending: FAMILY MEDICINE
Payer: MEDICARE

## 2019-01-11 DIAGNOSIS — R97.20 ELEVATED PSA: ICD-10-CM

## 2019-01-11 LAB — COMPLEXED PSA SERPL-MCNC: 6.5 NG/ML

## 2019-01-11 PROCEDURE — 84153 ASSAY OF PSA TOTAL: CPT

## 2019-01-11 PROCEDURE — 36415 COLL VENOUS BLD VENIPUNCTURE: CPT | Mod: PO

## 2019-01-11 NOTE — LETTER
Wilda Ramires, PharmD  [address] [place] [address]     Dear Peter Caldwell,    Welcome to the [facility]!           My name is Wilda Ramires PharmD and I am your dedicated Digital Medicine clinician.  As an expert in medication management, I will help ensure that the medications you are taking continue to provide you with the intended benefits.        I am Zachery Worley and I will be your health  for the duration of the program.  My  job is to help you identify lifestyle changes to improve your blood pressure control.  We will talk about nutrition, exercise, and other ways that you may be able to adjust your current habits to better your health. Together, we will work to improve your overall health and encourage you to meet your goals for a healthier lifestyle.    What we expect from YOU:    You will need to take blood pressure readings multiple times a week and no less than one reading per week.   It is important that you take your measurements at different times during the day, when possible.     What you should expect from your Digital Medicine Care Team:   We will provide you with education about high blood pressure, including lifestyle changes that could help you to control your blood pressure.   We will review your weekly readings and provide you with monthly blood pressure progress reports after you have been in the program for more than 30 days.   We will send monthly progress reports on your blood pressure control to your physician so they can follow along with your progress as well.    You will be able to reach me by phone at [phone] or through your [facility] account by clicking my name under Care Team on the right side of the home screen.    I look forward to working with you to achieve your blood pressure goals!    Sincerely,  Wilda Ramires PharmD  Your personal clinician    Please visit  [email or web address] to learn more about high blood pressure and what you can do lower your blood pressure.                                                                                           Peter Caldwell  [address] [address]

## 2019-01-11 NOTE — PROGRESS NOTES
Digital Medicine Enrollment Call    Introduced Mr. Peter Caldwell to Digital Medicine.     Discussed program expectations and requirements.    Introduced digital medicine care team.     Reviewed the importance of self-monitoring for digital medicine participation.     Reviewed that the Digital Medicine team is not available for emergencies and instructed the patient to call 911 or [facility] ([phone] or [phone]) if one arises.    Pt reports he will be leaving for a vacation on 1/26/19 for a week and will not be taking BP readings.               Last 5 Patient Entered Readings                                      Current 30 Day Average: 119/73     Recent Readings 1/10/2019 1/9/2019 1/8/2019 1/7/2019 1/7/2019    SBP (mmHg) 117 135 106 117 109    DBP (mmHg) 75 76 73 66 73    Pulse 62 62 68 60 60

## 2019-01-14 NOTE — PROGRESS NOTES
Subjective:       Patient ID: Peter Caldwell is a male.    Chief Complaint: Multiple issues see below    HPI Hypertension: blood pressures at home normal. Tolerating medicine.   Coronary artery disease: was seeing dr duran cardiology. No chest pain, palpitations or shortness of breath. Tolerating medication.   osteopen 2015 dexa  Hypercholesterolemia: . Tolerating medicine.switching to lipitor  recurr ing hernia: 3 surg for in past. Asympt x occas discomfort w lifting w recent move. He has defers reeval/tx  bph on flomax            Hx elev psa dr tomas due aylin      Past Medical History   Diagnosis Date    Hypertension     CAD (coronary artery disease)     ELINA (obstructive sleep apnea)     Hyperlipidemia     BPH (benign prostatic hyperplasia)     Elevated PSA      dr robles    Osteopenia 4/15 aylin 4/20    Mood disorder      chalasani    Ex-smoker     AAA (abdominal aortic aneurysm)     S/P CABG (coronary artery bypass graft) 8/9/2013    S/P PTCA (percutaneous transluminal coronary angioplasty) 8/9/2013    Cataract      Past Surgical History   Procedure Laterality Date    Coronary artery bypass graft      Ptca       rca    Hernia repair      Cholecystectomy      Coronary angioplasty       Family History   Problem Relation Age of Onset    Coronary artery disease Mother     Diabetes Mother     Heart disease Mother     Coronary artery disease Father     Heart disease Father     Heart disease Brother     Heart disease Brother      History     Social History    Marital Status:      Spouse Name: N/A     Number of Children: 2    Years of Education: N/A     Social History Main Topics    Smoking status: Former Smoker    Smokeless tobacco: Never Used    Alcohol Use: 0.6 oz/week     1 Glasses of wine per week      Comment: maybe every 6 months    Drug Use: No    Sexual Activity: None     Other Topics Concern    None     Social History Narrative       Cardiovascular: no chest  pain  Chest: no shortness of breath  Abd: no abd pain  Remainder review of systems negative    Objective:      Physical Exam   Constitutional: He appears well-developed and well-nourished.   HENT:   Head: Normocephalic and atraumatic.   Right Ear: External ear normal.   Left Ear: External ear normal.   Nose: Nose normal.   Mouth/Throat: Oropharynx is clear and moist.   Nl tms   Eyes: Conjunctivae and EOM are normal. Pupils are equal, round, and reactive to light. No scleral icterus.   Neck: Normal range of motion. Neck supple. Carotid bruit is not present.   Cardiovascular: Normal rate, regular rhythm and normal heart sounds.  Exam reveals no gallop and no friction rub.    No murmur heard.  Pulmonary/Chest: Effort normal and breath sounds normal. He has no wheezes.   Abdominal: Soft. Bowel sounds are normal. He exhibits no distension and no mass. There is no hepatosplenomegaly. There is no tenderness. There is no rebound and no guarding.   Musculoskeletal: Normal range of motion. He exhibits no edema or tenderness.   Lymphadenopathy:     He has no cervical adenopathy.   Neurological: He is alert. He has normal reflexes. No cranial nerve deficit. Coordination normal.   Skin: Skin is warm and dry. No rash noted. No erythema.   Psychiatric: He has a normal mood and affect. His behavior is normal. Judgment and thought content normal.   Nursing note and vitals reviewed.      Assessment:       1. Hypertension    2. CAD (coronary artery disease)    3. Hyperlipidemia    4. Osteopenia          AAA  bph  Mood d/o hx  elev psa  Plan:      F/u card annually  F/u one year  Aortu/s when due     Other lab including another psa (and u/s) one yr and f/u (he may want to do lab and u/s next yr at [facility])    Essential hypertension  -     Comprehensive metabolic panel; Future; Expected date: 01/07/2020  -     Lipid panel; Future; Expected date: 01/07/2020  -     Hypertension Digital Medicine (HDMP) Enrollment Order  -      Hypertension Digital Medicine (HDMP): Assign Onboarding Questionnaires    Elevated PSA  -     Prostate Specific Antigen, Diagnostic; Future; Expected date: 01/07/2019  -     Prostate Specific Antigen, Diagnostic; Future; Expected date: 01/07/2020    Abdominal aortic aneurysm (AAA) without rupture  -     US Abdominal Aorta; Future; Expected date: 01/07/2020    Coronary artery disease involving native coronary artery of native heart without angina pectoris    Osteopenia, unspecified location    Mood disorder    Vitamin D daily 800 units by mouth  Follow up with Dr Norman-eye dr  Shingrix new shingles vaccine  via a pharmacy    dexa due 2020    prevnar when ready

## 2019-01-15 ENCOUNTER — OFFICE VISIT (OUTPATIENT)
Dept: UROLOGY | Facility: CLINIC | Age: 74
End: 2019-01-15
Payer: MEDICARE

## 2019-01-15 VITALS
DIASTOLIC BLOOD PRESSURE: 74 MMHG | SYSTOLIC BLOOD PRESSURE: 108 MMHG | BODY MASS INDEX: 27.76 KG/M2 | RESPIRATION RATE: 16 BRPM | WEIGHT: 209.44 LBS | HEART RATE: 72 BPM | HEIGHT: 73 IN

## 2019-01-15 DIAGNOSIS — R97.20 ELEVATED PSA: Primary | ICD-10-CM

## 2019-01-15 PROCEDURE — 99999 PR PBB SHADOW E&M-EST. PATIENT-LVL III: CPT | Mod: PBBFAC,,, | Performed by: UROLOGY

## 2019-01-15 PROCEDURE — 99214 OFFICE O/P EST MOD 30 MIN: CPT | Mod: S$PBB,,, | Performed by: UROLOGY

## 2019-01-15 PROCEDURE — 99213 OFFICE O/P EST LOW 20 MIN: CPT | Mod: PBBFAC | Performed by: UROLOGY

## 2019-01-15 PROCEDURE — 99214 PR OFFICE/OUTPT VISIT, EST, LEVL IV, 30-39 MIN: ICD-10-PCS | Mod: S$PBB,,, | Performed by: UROLOGY

## 2019-01-15 PROCEDURE — 99999 PR PBB SHADOW E&M-EST. PATIENT-LVL III: ICD-10-PCS | Mod: PBBFAC,,, | Performed by: UROLOGY

## 2019-01-15 NOTE — PROGRESS NOTES
Clinic Note  1/15/2019      Subjective:         Chief Complaint:   HPI  Peter Caldwell is a 73 y.o. male with a history of elevated PSA. Last saw Dr. Robles in 2012. History of BPH on Flomax. Consult from Dr. Cardoso.  No prior TRUS or biopsy. Semi-retired educator.  Negative family history. Flomax for LUTS.    NIH risk:          Lab Results   Component Value Date    PSA 4.3 (H) 07/13/2016    PSA 3.9 03/18/2015    PSA 4.35 (H) 03/12/2013    PSA 4.15 (H) 12/12/2012    PSA 2.93 09/10/2012    PSA 4.18 (H) 06/07/2012    PSA 3.1 05/17/2011    PSA 2.6 12/15/2009    PSA 2.0 02/04/2005    PSADIAG 6.5 (H) 01/11/2019    PSADIAG 5.0 (H) 04/18/2017    PSADIAG 5.6 (H) 10/28/2016    PSADIAG 3.2 01/21/2014      Past Medical History:   Diagnosis Date    AAA (abdominal aortic aneurysm)     Arthritis     BPH (benign prostatic hyperplasia)     CAD (coronary artery disease)     khuri    Cataract     Depression     Elevated PSA     dr robles    Ex-smoker     Hyperlipidemia     Hypertension     Mood disorder     chalasani    ELINA (obstructive sleep apnea)     Osteopenia 4/15 aylin 4/20    Other insomnia 1/7/2019    S/P CABG (coronary artery bypass graft) 8/9/2013    S/P PTCA (percutaneous transluminal coronary angioplasty) 8/9/2013     Family History   Problem Relation Age of Onset    Coronary artery disease Mother     Diabetes Mother     Heart disease Mother     Coronary artery disease Father     Heart disease Father     Heart disease Brother     Heart disease Brother     Amblyopia Neg Hx     Blindness Neg Hx     Cataracts Neg Hx     Glaucoma Neg Hx     Macular degeneration Neg Hx     Retinal detachment Neg Hx     Strabismus Neg Hx      Social History     Socioeconomic History    Marital status:      Spouse name: Not on file    Number of children: 2    Years of education: Not on file    Highest education level: Not on file   Social Needs    Financial resource strain: Not on file    Food insecurity  - worry: Not on file    Food insecurity - inability: Not on file    Transportation needs - medical: Not on file    Transportation needs - non-medical: Not on file   Occupational History    Not on file   Tobacco Use    Smoking status: Former Smoker    Smokeless tobacco: Never Used   Substance and Sexual Activity    Alcohol use: Yes     Alcohol/week: 0.6 oz     Types: 1 Glasses of wine per week     Comment: maybe every 6 months    Drug use: No    Sexual activity: Not on file   Other Topics Concern    Not on file   Social History Narrative    Not on file     Past Surgical History:   Procedure Laterality Date    CATARACT EXTRACTION W/  INTRAOCULAR LENS IMPLANT Left 07/26/2016    Dr. Norman    CATARACT EXTRACTION W/  INTRAOCULAR LENS IMPLANT Right 08/09/2016    Dr. Norman    CHOLECYSTECTOMY      COLONOSCOPY N/A 3/7/2014    Performed [facility]ard [facility]    CORONARY ANGIOPLASTY      CORONARY ARTERY BYPASS GRAFT      HERNIA REPAIR      INSERTION-INTRAOCULAR LENS (IOL) Right 8/9/2016    Performed by Sid [facility] at [facility] OR 1ST FLR    INSERTION-INTRAOCULAR LENS (IOL) Left 7/26/2016    Performed by Sid Norman MD at [facility] OR 1ST FLR    PHACOEMULSIFICATION-ASPIRATION-CATARACT Right 8/9/2016    Performed by Sid Norman MD at [facility] OR 1ST FLR    PHACOEMULSIFICATION-ASPIRATION-CATARACT Left 7/26/2016    Performed by Sid Norman [facility] at [facility] OR 1ST FLR    ptca      rca     Patient Active Problem List   Diagnosis    Hypertension    CAD (coronary artery disease)    ELINA (obstructive sleep apnea)    Hyperlipidemia    BPH (benign prostatic hyperplasia)    Mood disorder    S/P CABG (coronary artery bypass graft)    S/P PTCA (percutaneous transluminal coronary angioplasty)    Osteopenia    AAA (abdominal aortic aneurysm)    Nuclear sclerosis of both eyes    Vitreous detachment of right eye    Refractive error    Senile nuclear  "sclerosis    Nuclear sclerosis of right eye    Elevated PSA    Short-term memory loss    Decreased hearing of both ears    Other insomnia    Right hip pain    Situational depression     Review of Systems   Constitutional: Negative for appetite change, chills, fatigue, fever and unexpected weight change.   HENT: Negative for nosebleeds.    Respiratory: Negative for shortness of breath and wheezing.    Cardiovascular: Negative for chest pain, palpitations and leg swelling.   Gastrointestinal: Negative for abdominal distention, abdominal pain, constipation, diarrhea, nausea and vomiting.   Genitourinary: Positive for nocturia. Negative for dysuria and hematuria.   Musculoskeletal: Negative for arthralgias and back pain.   Skin: Negative for pallor.   Neurological: Negative for dizziness, seizures and syncope.   Hematological: Negative for adenopathy.   Psychiatric/Behavioral: Negative for dysphoric mood.         Objective:      There were no vitals taken for this visit.  Estimated body mass index is 27.69 kg/m² as calculated from the following:    Height as of 1/7/19: 6' 1" (1.854 m).    Weight as of 1/7/19: 95.2 kg (209 lb 14.1 oz).  Physical Exam   Constitutional: He is oriented to person, place, and time. He appears well-developed and well-nourished. No distress.   HENT:   Head: Atraumatic.   Neck: No tracheal deviation present.   Cardiovascular: Normal rate.    Pulmonary/Chest: Effort normal. No respiratory distress. He has no wheezes.   Abdominal: Soft. Bowel sounds are normal. He exhibits no distension and no mass. There is no tenderness. There is no rebound and no guarding.   Genitourinary: Rectum normal. Rectal exam shows no external hemorrhoid, no internal hemorrhoid, no fissure, no mass and no tenderness. Prostate is enlarged.   Genitourinary Comments: 50 ccs   Neurological: He is alert and oriented to person, place, and time.   Skin: Skin is warm and dry. He is not diaphoretic.     Psychiatric: He has " a normal mood and affect. His behavior is normal. Judgment and thought content normal.         Assessment and Plan:           Problem List Items Addressed This Visit     Elevated PSA - Primary          Follow up:   Discussed PSA screening, age specific ranges, NIH risk. Discussed surveillance, mp MRI, TRUS biopsy.  Patient wants to pursue surveillance.  Letter to Dr. Cardoso.    Pawel Salinas

## 2019-01-15 NOTE — LETTER
January 15, 2019        Rony Cardoso MD  [address] [place] [address]             [facility] [place]  [address]  [facility] [address]  Phone: [phone]   Patient: Peter Caldwell   MR Number: [id number]   YOB: 1945   Date of Visit: 1/15/2019       Dear Dr. Cardoso:    Thank you for referring Peter Caldwell to me for evaluation. Attached you will find relevant portions of my assessment and plan of care.    If you have questions, please do not hesitate to call me. I look forward to following Peter Caldwell along with you.    Sincerely,      Pawel Salinas MD            CC  No Recipients    Enclosure

## 2019-01-22 ENCOUNTER — PATIENT OUTREACH (OUTPATIENT)
Dept: OTHER | Facility: OTHER | Age: 74
End: 2019-01-22

## 2019-01-22 NOTE — PROGRESS NOTES
Last 5 Patient Entered Readings                                      Current 30 Day Average: 126/79     Recent Readings 1/22/2019 1/21/2019 1/20/2019 1/20/2019 1/19/2019    SBP (mmHg) 124 121 126 130 145    DBP (mmHg) 80 78 78 81 82    Pulse 62 62 60 61 59        Digital Medicine: Health  Introduction Call     1/22: Left voicemail and requested call back in order to complete Digital Medicine health  introduction call.   Patient called back and left VM.

## 2019-01-23 ENCOUNTER — PATIENT MESSAGE (OUTPATIENT)
Dept: ADMINISTRATIVE | Facility: OTHER | Age: 74
End: 2019-01-23

## 2019-01-23 NOTE — PROGRESS NOTES
"Last 5 Patient Entered Readings                                      Current 30 Day Average: 126/79     Recent Readings 1/22/2019 1/21/2019 1/20/2019 1/20/2019 1/19/2019    SBP (mmHg) 124 121 126 130 145    DBP (mmHg) 80 78 78 81 82    Pulse 62 62 60 61 59        1/23: Called patient back for HC introduction. Left VM.  Patient called back.    Digital Medicine: Health  Introduction    Introduced Mr. Peter Caldwell to Digital Medicine. Discussed health  role and recommended lifestyle modifications.  Reviewed proper timing, body position, and technique for taking BP readings.  Pt acknowledged.     Patient reports he is leaving for a cruise this Saturday for 5 days.     Lifestyle Assessment:    Current Dietary Habits(i.e. low sodium, food labels, dining out):  Patient reports "he has very rarely used salt". Patient reports he does not cook with salt.  Patient reports he does go out to eat "quite a bit".  I encouraged patient to be mindful and salt intake that comes with eating out.  Will send resources via e-mail.     Exercise:  Patient reports he was very active through out all of his life.  Patient reports he had heart surgery and has not done much since.  Patient reports he has been having a problem with his hip and it has been keeping him from doing much.  I encouraged patient to stretch and walk a little bit and it might help improve the pain in his hip.  Will follow up.      Alcohol/Tobacco:  None    Medication Adherence:   has been compliant with the medicaiton regimen    Other goals:  Will discuss next call.    Reviewed AHA/AACE recommendations:  Limit sodium intake to <2000mg/day. Patient acknowledged.    Reviewed the importance of self-monitoring, medication adherence, and that the health  can be used as a resource for lifestyle modifications to help reduce or maintain a healthy lifestyle.  Reviewed that the Digital Medicine team is not available for emergencies and instructed the patient to " call 911 or [facility] ([phone] or [phone]) if one arises.

## 2019-01-29 RX ORDER — TAMSULOSIN HYDROCHLORIDE 0.4 MG/1
CAPSULE ORAL
Qty: 90 CAPSULE | Refills: 3 | Status: SHIPPED | OUTPATIENT
Start: 2019-01-29 | End: 2020-01-22

## 2019-02-04 ENCOUNTER — PATIENT OUTREACH (OUTPATIENT)
Dept: OTHER | Facility: OTHER | Age: 74
End: 2019-02-04

## 2019-02-04 DIAGNOSIS — I25.10 CORONARY ARTERY DISEASE, ANGINA PRESENCE UNSPECIFIED, UNSPECIFIED VESSEL OR LESION TYPE, UNSPECIFIED WHETHER NATIVE OR TRANSPLANTED HEART: Primary | ICD-10-CM

## 2019-02-04 NOTE — PROGRESS NOTES
Last 5 Patient Entered Readings                                      Current 30 Day Average: 125/78     Recent Readings 2/3/2019 2/3/2019 2/3/2019 2/2/2019 1/26/2019    SBP (mmHg) 126 134 131 122 110    DBP (mmHg) 77 77 73 76 72    Pulse 67 65 67 64 57      HPI:  74 yo male with a PMH listed below.  Past Medical History:   Diagnosis Date    AAA (abdominal aortic aneurysm)     Arthritis     BPH (benign prostatic hyperplasia)     CAD (coronary artery disease)     khuri    Cataract     Depression     Elevated PSA     dr robles    Ex-smoker     Hyperlipidemia     Hypertension     Mood disorder     chalasani    ELINA (obstructive sleep apnea)     Osteopenia 4/15 aylin 4/20    Other insomnia 1/7/2019    S/P CABG (coronary artery bypass graft) 8/9/2013    S/P PTCA (percutaneous transluminal coronary angioplasty) 8/9/2013     Patient endorses adherence to medication regimen. Patient denies hypotensive s/sx (lightheadedness, dizziness, nausea, fatigue); patient denies hypertensive s/sx (SOB, CP, severe headaches, changes in vision, dizziness, fatigue, confusion, anxiety, nosebleeds). Instructed patient to seek medical care if BP > 180/110 and is accompanied by hypertensive s/sx associated, patient confirms understanding.     Assessment:  Reviewed recent readings. Per 2017 ACC/ AHA HTN guidelines (goal of BP < 130/80), current 30-day average is well controlled.     IF DEPRESSED    Responses to the depression screening suggest patient is having depressive symptoms. Patient is not followed for this and is not interested in referral. Patient knows to contact me sooner if he can't handle the depression with behavorial changes that he plans to incorporate.    Rony Cardoso indicated he would like to see patient for further evaluation.     IF ELINA screen positive    ELINA screening results for this patient suggest a high likelihood of sleep apnea, which can contribute to hypertension. Patient has not been previously  diagnosed with sleep apnea and is not interested in referral at this time. He would like to wait six months to make    Rony Cardoso indicated he would like to have patient  referred to a specialist for further evaluation.     Plan:  Continue current medication regimen. I will continue to monitor regularly and will follow-up in 4-12 weeks, sooner if blood pressure begins to trend upward or downward.     Current medication regimen:  Hypertension Medications             metoprolol succinate (TOPROL-XL) 50 MG 24 hr tablet TAKE ONE TABLET BY MOUTH EVERY EVENING        Patient denies having questions or concerns. Patient has my contact information and knows to call with any concerns or clinical changes.

## 2019-02-05 ENCOUNTER — PATIENT MESSAGE (OUTPATIENT)
Dept: OTHER | Facility: OTHER | Age: 74
End: 2019-02-05

## 2019-02-05 ENCOUNTER — OFFICE VISIT (OUTPATIENT)
Dept: URGENT CARE | Facility: CLINIC | Age: 74
End: 2019-02-05
Payer: MEDICARE

## 2019-02-05 VITALS
WEIGHT: 209 LBS | TEMPERATURE: 98 F | OXYGEN SATURATION: 98 % | HEART RATE: 67 BPM | HEIGHT: 73 IN | SYSTOLIC BLOOD PRESSURE: 122 MMHG | BODY MASS INDEX: 27.7 KG/M2 | DIASTOLIC BLOOD PRESSURE: 67 MMHG

## 2019-02-05 DIAGNOSIS — J06.9 VIRAL URI WITH COUGH: Primary | ICD-10-CM

## 2019-02-05 PROCEDURE — 99214 OFFICE O/P EST MOD 30 MIN: CPT | Mod: S$GLB,,, | Performed by: NURSE PRACTITIONER

## 2019-02-05 PROCEDURE — 99214 PR OFFICE/OUTPT VISIT, EST, LEVL IV, 30-39 MIN: ICD-10-PCS | Mod: S$GLB,,, | Performed by: NURSE PRACTITIONER

## 2019-02-05 RX ORDER — FLUTICASONE PROPIONATE 50 MCG
1 SPRAY, SUSPENSION (ML) NASAL DAILY
Qty: 9.9 ML | Refills: 0 | Status: SHIPPED | OUTPATIENT
Start: 2019-02-05 | End: 2019-02-06 | Stop reason: SDUPTHER

## 2019-02-05 NOTE — PATIENT INSTRUCTIONS
Take   Delsym over-the-counter for cough and mucinex.    You must understand that you've received an Urgent Care treatment only and that you may be released before all your medical problems are known or treated. You, the patient, will arrange for follow up care as instructed.  If your condition worsens we recommend that you receive another evaluation at the emergency room immediately or contact your primary medical clinics after hours call service to discuss your concerns.  Please return here or go to the Emergency Department for any concerns or worsening of condition.      Viral Upper Respiratory Illness (Adult)  You have a viral upper respiratory illness (URI), which is another term for the common cold. This illness is contagious during the first few days. It is spread through the air by coughing and sneezing. It may also be spread by direct contact (touching the sick person and then touching your own eyes, nose, or mouth). Frequent handwashing will decrease risk of spread. Most viral illnesses go away within 7 to 10 days with rest and simple home remedies. Sometimes the illness may last for several weeks. Antibiotics will not kill a virus, and they are generally not prescribed for this condition.    Home care  · If symptoms are severe, rest at home for the first 2 to 3 days. When you resume activity, don't let yourself get too tired.  · Avoid being exposed to cigarette smoke (yours or others).  · You may use acetaminophen or ibuprofen to control pain and fever, unless another medicine was prescribed. (Note: If you have chronic liver or kidney disease, have ever had a stomach ulcer or gastrointestinal bleeding, or are taking blood-thinning medicines, talk with your healthcare provider before using these medicines.) Aspirin should never be given to anyone under 18 years of age who is ill with a viral infection or fever. It may cause severe liver or brain damage.  · Your appetite may be poor, so a light diet is  fine. Avoid dehydration by drinking 6 to 8 glasses of fluids per day (water, soft drinks, juices, tea, or soup). Extra fluids will help loosen secretions in the nose and lungs.  · Over-the-counter cold medicines will not shorten the length of time youre sick, but they may be helpful for the following symptoms: cough, sore throat, and nasal and sinus congestion. (Note: Do not use decongestants if you have high blood pressure.)  Follow-up care  Follow up with your healthcare provider, or as advised.  When to seek medical advice  Call your healthcare provider right away if any of these occur:  · Cough with lots of colored sputum (mucus)  · Severe headache; face, neck, or ear pain  · Difficulty swallowing due to throat pain  · Fever of 100.4°F (38°C)  Call 911, or get immediate medical care  Call emergency services right away if any of these occur:  · Chest pain, shortness of breath, wheezing, or difficulty breathing  · Coughing up blood  · Inability to swallow due to throat pain  Date Last Reviewed: 9/13/2015 © 2000-2017 [employer]. [address] [place] [id number]. All rights reserved. This information is not intended as a substitute for professional medical care. Always follow your healthcare professional's instructions.

## 2019-02-05 NOTE — PROGRESS NOTES
"Subjective:       Patient ID: Peter Caldwell is a 73 y.o. male.    Vitals:  height is 6' 1" (1.854 m) and weight is 94.8 kg (209 lb). His temperature is 98.2 °F (36.8 °C). His blood pressure is 122/67 and his pulse is 67. His oxygen saturation is 98%.     Chief Complaint: Cough and Nasal Congestion    Cough   This is a new problem. Episode onset: 5 days. The problem has been gradually worsening. The problem occurs every few minutes. The cough is productive of sputum. Associated symptoms include nasal congestion, postnasal drip and a sore throat. Pertinent negatives include no chills, ear pain, eye redness, fever, hemoptysis, myalgias, rash, shortness of breath or wheezing. The symptoms are aggravated by lying down and cold air. He has tried nothing for the symptoms. The treatment provided no relief. His past medical history is significant for pneumonia.       Constitution: Negative for chills, sweating, fatigue and fever.   HENT: Positive for congestion, postnasal drip and sore throat. Negative for ear pain, sinus pain, sinus pressure and voice change.    Neck: Negative for painful lymph nodes.   Eyes: Negative for eye redness.   Respiratory: Positive for cough. Negative for chest tightness, sputum production, bloody sputum, COPD, shortness of breath, stridor, wheezing and asthma.    Gastrointestinal: Negative for nausea and vomiting.   Musculoskeletal: Negative for muscle ache.   Skin: Negative for rash.   Allergic/Immunologic: Negative for seasonal allergies and asthma.   Hematologic/Lymphatic: Negative for swollen lymph nodes.       Objective:      Physical Exam   Constitutional: He is oriented to person, place, and time. He appears well-developed and well-nourished. He is cooperative.  Non-toxic appearance. He does not appear ill. No distress.   HENT:   Head: Normocephalic and atraumatic.   Right Ear: Hearing, tympanic membrane, external ear and ear canal normal.   Left Ear: Hearing, tympanic membrane, " external ear and ear canal normal.   Nose: Mucosal edema and rhinorrhea present. No nasal deformity. No epistaxis. Right sinus exhibits no maxillary sinus tenderness and no frontal sinus tenderness. Left sinus exhibits no maxillary sinus tenderness and no frontal sinus tenderness.   Mouth/Throat: Uvula is midline, oropharynx is clear and moist and mucous membranes are normal. No trismus in the jaw. Normal dentition. No uvula swelling. No posterior oropharyngeal erythema.   Eyes: Conjunctivae and lids are normal. No scleral icterus.   Sclera clear bilat   Neck: Trachea normal, full passive range of motion without pain and phonation normal. Neck supple.   Cardiovascular: Normal rate, regular rhythm, normal heart sounds, intact distal pulses and normal pulses.   Pulmonary/Chest: Effort normal and breath sounds normal. No respiratory distress.   Abdominal: Soft. Normal appearance and bowel sounds are normal. He exhibits no distension. There is no tenderness.   Musculoskeletal: Normal range of motion. He exhibits no edema or deformity.   Neurological: He is alert and oriented to person, place, and time. He exhibits normal muscle tone. Coordination normal.   Skin: Skin is warm, dry and intact. He is not diaphoretic. No pallor.   Psychiatric: He has a normal mood and affect. His speech is normal and behavior is normal. Judgment and thought content normal. Cognition and memory are normal.   Nursing note and vitals reviewed.      Assessment:       1. Viral URI with cough        Plan:           Lengthy discussion with patient between the difference of viral and bacterial infections.  He verbalizes an understanding regarding over-the-counter medications.    Viral URI with cough  -     fluticasone (FLONASE) 50 mcg/actuation nasal spray; 1 spray (50 mcg total) by Each Nare route once daily.  Dispense: 9.9 mL; Refill: 0            Patient Instructions   Take   Delsym over-the-counter for cough and mucinex.    You must understand  that you've received an Urgent Care treatment only and that you may be released before all your medical problems are known or treated. You, the patient, will arrange for follow up care as instructed.  If your condition worsens we recommend that you receive another evaluation at the emergency room immediately or contact your primary medical clinics after hours call service to discuss your concerns.  Please return here or go to the Emergency Department for any concerns or worsening of condition.      Viral Upper Respiratory Illness (Adult)  You have a viral upper respiratory illness (URI), which is another term for the common cold. This illness is contagious during the first few days. It is spread through the air by coughing and sneezing. It may also be spread by direct contact (touching the sick person and then touching your own eyes, nose, or mouth). Frequent handwashing will decrease risk of spread. Most viral illnesses go away within 7 to 10 days with rest and simple home remedies. Sometimes the illness may last for several weeks. Antibiotics will not kill a virus, and they are generally not prescribed for this condition.    Home care  · If symptoms are severe, rest at home for the first 2 to 3 days. When you resume activity, don't let yourself get too tired.  · Avoid being exposed to cigarette smoke (yours or others).  · You may use acetaminophen or ibuprofen to control pain and fever, unless another medicine was prescribed. (Note: If you have chronic liver or kidney disease, have ever had a stomach ulcer or gastrointestinal bleeding, or are taking blood-thinning medicines, talk with your healthcare provider before using these medicines.) Aspirin should never be given to anyone under 18 years of age who is ill with a viral infection or fever. It may cause severe liver or brain damage.  · Your appetite may be poor, so a light diet is fine. Avoid dehydration by drinking 6 to 8 glasses of fluids per day (water,  soft drinks, juices, tea, or soup). Extra fluids will help loosen secretions in the nose and lungs.  · Over-the-counter cold medicines will not shorten the length of time youre sick, but they may be helpful for the following symptoms: cough, sore throat, and nasal and sinus congestion. (Note: Do not use decongestants if you have high blood pressure.)  Follow-up care  Follow up with your healthcare provider, or as advised.  When to seek medical advice  Call your healthcare provider right away if any of these occur:  · Cough with lots of colored sputum (mucus)  · Severe headache; face, neck, or ear pain  · Difficulty swallowing due to throat pain  · Fever of 100.4°F (38°C)  Call 911, or get immediate medical care  Call emergency services right away if any of these occur:  · Chest pain, shortness of breath, wheezing, or difficulty breathing  · Coughing up blood  · Inability to swallow due to throat pain  Date Last Reviewed: 9/13/2015  © 2172-3073 [employer]. [address] [place] [address], [place] [address]. All rights reserved. This information is not intended as a substitute for professional medical care. Always follow your healthcare professional's instructions.

## 2019-02-06 ENCOUNTER — CLINICAL SUPPORT (OUTPATIENT)
Dept: CARDIOLOGY | Facility: CLINIC | Age: 74
End: 2019-02-06
Payer: MEDICARE

## 2019-02-06 ENCOUNTER — OFFICE VISIT (OUTPATIENT)
Dept: CARDIOLOGY | Facility: CLINIC | Age: 74
End: 2019-02-06
Payer: MEDICARE

## 2019-02-06 VITALS
BODY MASS INDEX: 27.7 KG/M2 | SYSTOLIC BLOOD PRESSURE: 118 MMHG | HEART RATE: 58 BPM | DIASTOLIC BLOOD PRESSURE: 72 MMHG | HEIGHT: 73 IN | WEIGHT: 209 LBS

## 2019-02-06 DIAGNOSIS — I10 ESSENTIAL HYPERTENSION: ICD-10-CM

## 2019-02-06 DIAGNOSIS — E78.5 HYPERLIPIDEMIA, UNSPECIFIED HYPERLIPIDEMIA TYPE: ICD-10-CM

## 2019-02-06 DIAGNOSIS — G47.33 OSA (OBSTRUCTIVE SLEEP APNEA): ICD-10-CM

## 2019-02-06 DIAGNOSIS — Z98.61 S/P PTCA (PERCUTANEOUS TRANSLUMINAL CORONARY ANGIOPLASTY): ICD-10-CM

## 2019-02-06 DIAGNOSIS — Z95.1 S/P CABG (CORONARY ARTERY BYPASS GRAFT): ICD-10-CM

## 2019-02-06 DIAGNOSIS — I25.10 CORONARY ARTERY DISEASE, ANGINA PRESENCE UNSPECIFIED, UNSPECIFIED VESSEL OR LESION TYPE, UNSPECIFIED WHETHER NATIVE OR TRANSPLANTED HEART: ICD-10-CM

## 2019-02-06 DIAGNOSIS — I71.40 ABDOMINAL AORTIC ANEURYSM (AAA) WITHOUT RUPTURE: ICD-10-CM

## 2019-02-06 DIAGNOSIS — I25.10 CORONARY ARTERY DISEASE INVOLVING NATIVE CORONARY ARTERY OF NATIVE HEART WITHOUT ANGINA PECTORIS: Primary | ICD-10-CM

## 2019-02-06 PROCEDURE — 99999 PR PBB SHADOW E&M-EST. PATIENT-LVL III: CPT | Mod: PBBFAC,,, | Performed by: INTERNAL MEDICINE

## 2019-02-06 PROCEDURE — 93005 ELECTROCARDIOGRAM TRACING: CPT | Mod: PBBFAC,PO | Performed by: INTERNAL MEDICINE

## 2019-02-06 PROCEDURE — 99999 PR PBB SHADOW E&M-EST. PATIENT-LVL III: ICD-10-PCS | Mod: PBBFAC,,, | Performed by: INTERNAL MEDICINE

## 2019-02-06 PROCEDURE — 99214 PR OFFICE/OUTPT VISIT, EST, LEVL IV, 30-39 MIN: ICD-10-PCS | Mod: S$PBB,,, | Performed by: INTERNAL MEDICINE

## 2019-02-06 PROCEDURE — 93010 ELECTROCARDIOGRAM REPORT: CPT | Mod: S$PBB,,, | Performed by: INTERNAL MEDICINE

## 2019-02-06 PROCEDURE — 93010 EKG 12-LEAD: ICD-10-PCS | Mod: S$PBB,,, | Performed by: INTERNAL MEDICINE

## 2019-02-06 PROCEDURE — 99214 OFFICE O/P EST MOD 30 MIN: CPT | Mod: S$PBB,,, | Performed by: INTERNAL MEDICINE

## 2019-02-06 PROCEDURE — 99213 OFFICE O/P EST LOW 20 MIN: CPT | Mod: PBBFAC,PN,25 | Performed by: INTERNAL MEDICINE

## 2019-02-06 NOTE — PROGRESS NOTES
Subjective:   Patient ID:  Peter Caldwell is a 73 y.o. male who presents for follow up of URI; Ankle Pain; and toe cramps      HPI  A 74 yo male with cad s/p cabg htn hlp  Is her eforf /u. He has been complaining of uri symptoms has been evaluated in after hours. Has no chest pain no shortness of breath does stretching exercise. He tries to be compliant with diet no regular exercise. Has no palpitation syncope near syncope no definite swelling in lower extremities. Has no chf symptoms. Last u/s abd aorta stable aneurysm.has last functional eval 5 years ago. Will get repeat,.   Past Medical History:   Diagnosis Date    AAA (abdominal aortic aneurysm)     Arthritis     BPH (benign prostatic hyperplasia)     CAD (coronary artery disease)     khuri    Cataract     Depression     Elevated PSA     dr robles    Ex-smoker     Hyperlipidemia     Hypertension     Mood disorder     chalasani    ELINA (obstructive sleep apnea)     Osteopenia 4/15 aylin 4/20    Other insomnia 1/7/2019    S/P CABG (coronary artery bypass graft) 8/9/2013    S/P PTCA (percutaneous transluminal coronary angioplasty) 8/9/2013       Past Surgical History:   Procedure Laterality Date    CATARACT EXTRACTION W/  INTRAOCULAR LENS IMPLANT Left 07/26/2016    Dr. Norman    CATARACT EXTRACTION W/  INTRAOCULAR LENS IMPLANT Right 08/09/2016    Dr. Norman    CHOLECYSTECTOMY      COLONOSCOPY N/A 3/7/2014    [facility]    CORONARY ANGIOPLASTY      CORONARY ARTERY BYPASS GRAFT      HERNIA REPAIR      INSERTION-INTRAOCULAR LENS (IOL) Right 8/9/2016    [facility] OR [facility]    INSERTION-INTRAOCULAR LENS (IOL) Left 7/26/2016    [facility] OR [facility]    PHACOEMULSIFICATION-ASPIRATION-CATARACT Right 8/9/2016    [facility]    PHACOEMULSIFICATION-ASPIRATION-CATARACT Left 7/26/2016     [facility] at [facility] OR [address] [place] [address]    ptca      rca       Social History     Tobacco Use    Smoking status: Former Smoker    Smokeless tobacco: Never Used   Substance Use Topics    Alcohol use: Yes     Alcohol/week: 0.6 oz     Types: 1 Glasses of wine per week     Comment: maybe every 6 months    Drug use: No       Family History   Problem Relation Age of Onset    Coronary artery disease Mother     Diabetes Mother     Heart disease Mother     Coronary artery disease Father     Heart disease Father     Heart disease Brother     Heart disease Brother     Amblyopia Neg Hx     Blindness Neg Hx     Cataracts Neg Hx     Glaucoma Neg Hx     Macular degeneration Neg Hx     Retinal detachment Neg Hx     Strabismus Neg Hx        Current Outpatient Medications   Medication Sig    aspirin (ECOTRIN) 81 MG EC tablet Take 81 mg by mouth every evening.     atorvastatin (LIPITOR) 80 MG tablet TAKE 1 TABLET BY MOUTH EVERY NIGHT AT BEDTIME    fluticasone (FLONASE) 50 mcg/actuation nasal spray SHAKE LIQUID AND USE 1 SPRAY IN EACH NOSTRIL EVERY DAY    ibuprofen (ADVIL,MOTRIN) 200 MG tablet Take 200 mg by mouth every 6 (six) hours as needed for Pain.    metoprolol succinate (TOPROL-XL) 50 MG 24 hr tablet TAKE ONE TABLET BY MOUTH EVERY EVENING    tamsulosin (FLOMAX) 0.4 mg Cap TAKE 1 CAPSULE BY MOUTH EVERY DAY     No current facility-administered medications for this visit.      Current Outpatient Medications on File Prior to Visit   Medication Sig    aspirin (ECOTRIN) 81 MG EC tablet Take 81 mg by mouth every evening.     atorvastatin (LIPITOR) 80 MG tablet TAKE 1 TABLET BY MOUTH EVERY NIGHT AT BEDTIME    fluticasone (FLONASE) 50 mcg/actuation nasal spray SHAKE LIQUID AND USE 1 SPRAY IN EACH NOSTRIL EVERY DAY    ibuprofen (ADVIL,MOTRIN) 200 MG tablet Take 200 mg by mouth every 6 (six) hours as needed for Pain.    metoprolol succinate (TOPROL-XL) 50 MG 24 hr tablet TAKE ONE TABLET BY  MOUTH EVERY EVENING    tamsulosin (FLOMAX) 0.4 mg Cap TAKE 1 CAPSULE BY MOUTH EVERY DAY    [DISCONTINUED] fluticasone (FLONASE) 50 mcg/actuation nasal spray 1 spray (50 mcg total) by Each Nare route once daily.     No current facility-administered medications on file prior to visit.      Review of patient's allergies indicates:   Allergen Reactions    Penicillins      Other reaction(s): Rash    Promethazine Other (See Comments)     paranoia    Percodan [oxycodone hcl-oxycodone-asa]      Sleep / nightmare problems         Review of Systems   Constitution: Negative for weakness and malaise/fatigue.   HENT: Positive for congestion and hoarse voice.    Eyes: Negative for blurred vision.   Cardiovascular: Negative for chest pain, claudication, cyanosis, dyspnea on exertion, irregular heartbeat, leg swelling, near-syncope, orthopnea, palpitations and paroxysmal nocturnal dyspnea.   Respiratory: Positive for cough and snoring. Negative for hemoptysis and shortness of breath.    Hematologic/Lymphatic: Negative for bleeding problem. Does not bruise/bleed easily.   Skin: Negative for dry skin and itching.   Musculoskeletal: Negative for falls, muscle weakness and myalgias.   Gastrointestinal: Negative for abdominal pain, diarrhea, heartburn, hematemesis, hematochezia and melena.   Genitourinary: Negative for flank pain and hematuria.   Neurological: Negative for dizziness, focal weakness, headaches, light-headedness, numbness, paresthesias and seizures.   Psychiatric/Behavioral: Negative for altered mental status and memory loss. The patient is not nervous/anxious.    Allergic/Immunologic: Negative for hives.       Objective:   Physical Exam   Constitutional: He is oriented to person, place, and time. He appears well-developed and well-nourished. No distress.   HENT:   Head: Normocephalic and atraumatic.   Eyes: EOM are normal. Pupils are equal, round, and reactive to light. Right eye exhibits no discharge. Left eye  "exhibits no discharge.   Neck: Neck supple. No JVD present. No thyromegaly present.   Cardiovascular: Normal rate, regular rhythm and intact distal pulses. Exam reveals no gallop and no friction rub.   Murmur heard.   Harsh midsystolic murmur is present with a grade of 1/6 at the upper right sternal border radiating to the neck.  Pulmonary/Chest: Effort normal and breath sounds normal. No respiratory distress. He has no wheezes. He has no rales. He exhibits no tenderness.   Scar cabg well healed.   Abdominal: Soft. Bowel sounds are normal. He exhibits no distension. There is no tenderness.   Musculoskeletal: Normal range of motion. He exhibits no edema.   Neurological: He is alert and oriented to person, place, and time. No cranial nerve deficit.   Skin: Skin is warm and dry. No rash noted. He is not diaphoretic. No erythema.   Psychiatric: He has a normal mood and affect. His behavior is normal.   Nursing note and vitals reviewed.    Vitals:    02/06/19 1441   BP: 118/72   BP Location: Right arm   Patient Position: Sitting   BP Method: Medium (Manual)   Pulse: (!) 58   Weight: 94.8 kg (209 lb)   Height: 6' 1" (1.854 m)     Lab Results   Component Value Date    CHOL 122 08/06/2018    CHOL 142 08/15/2017    CHOL 121 02/08/2016     Lab Results   Component Value Date    HDL 41 08/06/2018    HDL 45 08/15/2017    HDL 44 02/08/2016     Lab Results   Component Value Date    LDLCALC 60.6 (L) 08/06/2018    LDLCALC 80.8 08/15/2017    LDLCALC 61.6 (L) 02/08/2016     Lab Results   Component Value Date    TRIG 102 08/06/2018    TRIG 81 08/15/2017    TRIG 77 02/08/2016     Lab Results   Component Value Date    CHOLHDL 33.6 08/06/2018    CHOLHDL 31.7 08/15/2017    CHOLHDL 36.4 02/08/2016       Chemistry        Component Value Date/Time     08/06/2018 0747    K 4.7 08/06/2018 0747     08/06/2018 0747    CO2 26 08/06/2018 0747    BUN 10 08/06/2018 0747    CREATININE 1.0 08/06/2018 0747     08/06/2018 0747      "   Component Value Date/Time    CALCIUM 9.4 08/06/2018 0747    ALKPHOS 56 08/06/2018 0747    AST 21 08/06/2018 0747    ALT 24 08/06/2018 0747    BILITOT 1.0 08/06/2018 0747    ESTGFRAFRICA >60 08/06/2018 0747    EGFRNONAA >60 08/06/2018 0747          Lab Results   Component Value Date    TSH 2.1 05/13/2005     Lab Results   Component Value Date    INR 1.0 06/02/2009     Lab Results   Component Value Date    WBC 6.15 06/02/2009    HGB 15.7 06/02/2009    HCT 46.0 06/02/2009    MCV 90.4 06/02/2009     06/02/2009     BMP  Sodium   Date Value Ref Range Status   08/06/2018 142 136 - 145 mmol/L Final     Potassium   Date Value Ref Range Status   08/06/2018 4.7 3.5 - 5.1 mmol/L Final     Chloride   Date Value Ref Range Status   08/06/2018 107 95 - 110 mmol/L Final     CO2   Date Value Ref Range Status   08/06/2018 26 23 - 29 mmol/L Final     BUN, Bld   Date Value Ref Range Status   08/06/2018 10 8 - 23 mg/dL Final     Creatinine   Date Value Ref Range Status   08/06/2018 1.0 0.5 - 1.4 mg/dL Final     Calcium   Date Value Ref Range Status   08/06/2018 9.4 8.7 - 10.5 mg/dL Final     Anion Gap   Date Value Ref Range Status   08/06/2018 9 8 - 16 mmol/L Final     eGFR if    Date Value Ref Range Status   08/06/2018 >60 >60 mL/min/1.73 m^2 Final     eGFR if non    Date Value Ref Range Status   08/06/2018 >60 >60 mL/min/1.73 m^2 Final     Comment:     Calculation used to obtain the estimated glomerular filtration  rate (eGFR) is the CKD-EPI equation.        CrCl cannot be calculated (Patient's most recent lab result is older than the maximum 7 days allowed.).    Assessment:     1. Coronary artery disease involving native coronary artery of native heart without angina pectoris    2. Essential hypertension    3. ELINA (obstructive sleep apnea)    4. Hyperlipidemia, unspecified hyperlipidemia type    5. S/P CABG (coronary artery bypass graft)    6. S/P PTCA (percutaneous transluminal coronary  angioplasty)    7. Abdominal aortic aneurysm (AAA) without rupture      Stable clinically has no angina ekg w/o ischemic changes.   counseled about compliance with diet exercise .   Will need repeat non invasive eval.   Plan:   ecjho   lexiscan   Continue current therapy  Cardiac low salt diet.  Risk factor modification and excercise program.  F/u in 6 months with lipid cmp   abdomianl u/s for aaa.

## 2019-02-08 ENCOUNTER — PATIENT MESSAGE (OUTPATIENT)
Dept: INTERNAL MEDICINE | Facility: CLINIC | Age: 74
End: 2019-02-08

## 2019-02-10 DIAGNOSIS — I25.10 CAD (CORONARY ARTERY DISEASE): ICD-10-CM

## 2019-02-11 RX ORDER — METOPROLOL SUCCINATE 50 MG/1
TABLET, EXTENDED RELEASE ORAL
Qty: 90 TABLET | Refills: 3 | Status: SHIPPED | OUTPATIENT
Start: 2019-02-11 | End: 2020-01-21 | Stop reason: SDUPTHER

## 2019-02-27 ENCOUNTER — PATIENT OUTREACH (OUTPATIENT)
Dept: OTHER | Facility: OTHER | Age: 74
End: 2019-02-27

## 2019-02-27 NOTE — PROGRESS NOTES
"Last 5 Patient Entered Readings                                      Current 30 Day Average: 128/78     Recent Readings 2/24/2019 2/22/2019 2/22/2019 2/20/2019 2/13/2019    SBP (mmHg) 118 132 133 136 134    DBP (mmHg) 76 78 83 77 74    Pulse 60 65 67 63 61        Digital Medicine: Health  Follow Up    Patient reports he is on the "tail end" of being sick.  BP is well controlled.     Lifestyle Modifications:    1.Dietary Modifications (Sodium intake <2,000mg/day, food labels, dining out):   Pt denies needing any other help with nutrition at this time.    2.Physical Activity:   Patient reports "he needs to get off his butt and get moving".  Patient reports "it is" on the agenda.  Will continue to follow up.        3.Medication Therapy:   Patient has been compliant with the medication regimen.    4.Patient has the following medication side effects/concerns: None  (Frequency/Alleviating factors/Precipitating factors, etc.)     Follow up with Mr. Peter Caldwell completed. No further questions or concerns. Will continue to follow up to achieve health goals.  Patient is currently at goal, 128/78 mmHg does not exceed <130/80 mmHg.    "

## 2019-03-09 ENCOUNTER — PATIENT MESSAGE (OUTPATIENT)
Dept: INTERNAL MEDICINE | Facility: CLINIC | Age: 74
End: 2019-03-09

## 2019-03-09 DIAGNOSIS — H91.90 HEARING LOSS, UNSPECIFIED HEARING LOSS TYPE, UNSPECIFIED LATERALITY: Primary | ICD-10-CM

## 2019-03-15 ENCOUNTER — PATIENT OUTREACH (OUTPATIENT)
Dept: OTHER | Facility: OTHER | Age: 74
End: 2019-03-15

## 2019-03-15 NOTE — PROGRESS NOTES
Last 5 Patient Entered Readings                                      Current 30 Day Average: 133/78     Recent Readings 3/14/2019 3/14/2019 3/12/2019 3/12/2019 3/11/2019    SBP (mmHg) 132 131 133 137 123    DBP (mmHg) 76 76 77 76 65    Pulse 65 68 77 77 61        HPI:  Called patient to follow up. Patient indicated his elevated readings are related to family being in town for [place] and not using the proper technique by sitting and resting for 5-10 minutes. Patient endorses adherence to medication regimen. Patient denies hypotensive s/sx (lightheadedness, dizziness, nausea, fatigue); patient denies hypertensive s/sx (SOB, CP, severe headaches, changes in vision). Instructed patient to seek medical care if BP > 180/110 and is accompanied by hypertensive s/sx associated, patient confirms understanding.     Assessment:  Reviewed recent readings. Per 2017 ACC/ AHA HTN guidelines (goal of BP < 130/80), current 30-day average needs to be addressed more thoroughly today.     Plan:  Continue current medication regimen. Monitor technique. I will continue to monitor regularly and will follow-up in 4 weeks, sooner if blood pressure begins to trend upward or downward.     Current medication regimen:  Hypertension Medications             metoprolol succinate (TOPROL-XL) 50 MG 24 hr tablet TAKE ONE TABLET BY MOUTH EVERY EVENING          Patient denies having questions or concerns. Patient has my contact information and knows to call with any concerns or clinical changes.

## 2019-03-26 ENCOUNTER — PATIENT MESSAGE (OUTPATIENT)
Dept: OTHER | Facility: OTHER | Age: 74
End: 2019-03-26

## 2019-04-10 ENCOUNTER — PATIENT OUTREACH (OUTPATIENT)
Dept: OTHER | Facility: OTHER | Age: 74
End: 2019-04-10

## 2019-04-10 NOTE — PROGRESS NOTES
"Last 5 Patient Entered Readings                                      Current 30 Day Average: 125/75     Recent Readings 4/10/2019 4/9/2019 4/8/2019 4/8/2019 4/8/2019    SBP (mmHg) 120 116 133 134 130    DBP (mmHg) 75 71 73 66 79    Pulse 58 63 57 60 62          Digital Medicine: Health  Follow Up    Patient reports he has a roller coaster of "social things" going on.   Patient reports he does "breathing exercises" before taking his BP readings. Encouraged patient to keep up the great work.     Lifestyle Modifications:    1.Dietary Modifications (Sodium intake <2,000mg/day, food labels, dining out):   Pt denies needing any other help with nutrition at this time.    2.Physical Activity:   Patient reports he has been working on the yard and trying to stay active.     3.Medication Therapy:   Patient has been compliant with the medication regimen.    4.Patient has the following medication side effects/concerns: None  (Frequency/Alleviating factors/Precipitating factors, etc.)     Follow up with Mr. Peter Caldwell completed. No further questions or concerns. Will continue to follow up to achieve health goals.  Patient is currently at goal, 125/75 mmHg does not exceed <130/80 mmHg.          "

## 2019-05-06 RX ORDER — FLUTICASONE PROPIONATE 50 MCG
SPRAY, SUSPENSION (ML) NASAL
Qty: 16 ML | Refills: 11 | Status: SHIPPED | OUTPATIENT
Start: 2019-05-06 | End: 2020-06-25

## 2019-06-03 ENCOUNTER — PATIENT OUTREACH (OUTPATIENT)
Dept: OTHER | Facility: OTHER | Age: 74
End: 2019-06-03

## 2019-06-03 ENCOUNTER — PATIENT MESSAGE (OUTPATIENT)
Dept: OTHER | Facility: OTHER | Age: 74
End: 2019-06-03

## 2019-06-03 NOTE — PROGRESS NOTES
Last 5 Patient Entered Readings                                      Current 30 Day Average: 125/75     Recent Readings 6/2/2019 5/31/2019 5/31/2019 5/31/2019 5/30/2019    SBP (mmHg) 113 118 111 121 119    DBP (mmHg) 74 71 69 74 64    Pulse 73 63 64 64 74        6/3: Patient sent [employer] message stating he will be traveling for 2+ weeks and will not be taking his machine.  Responded to patient via [employer], and will be placing him on hiatus for one month.

## 2019-06-23 ENCOUNTER — OFFICE VISIT (OUTPATIENT)
Dept: URGENT CARE | Facility: CLINIC | Age: 74
End: 2019-06-23
Payer: MEDICARE

## 2019-06-23 VITALS
RESPIRATION RATE: 18 BRPM | SYSTOLIC BLOOD PRESSURE: 117 MMHG | OXYGEN SATURATION: 96 % | BODY MASS INDEX: 27.7 KG/M2 | DIASTOLIC BLOOD PRESSURE: 78 MMHG | HEART RATE: 69 BPM | TEMPERATURE: 99 F | HEIGHT: 73 IN | WEIGHT: 209 LBS

## 2019-06-23 DIAGNOSIS — R05.9 COUGH: Primary | ICD-10-CM

## 2019-06-23 DIAGNOSIS — R09.81 SINUS CONGESTION: ICD-10-CM

## 2019-06-23 PROCEDURE — 71046 XR CHEST PA AND LATERAL: ICD-10-PCS | Mod: S$GLB,,, | Performed by: RADIOLOGY

## 2019-06-23 PROCEDURE — 71046 X-RAY EXAM CHEST 2 VIEWS: CPT | Mod: S$GLB,,, | Performed by: RADIOLOGY

## 2019-06-23 PROCEDURE — 99214 OFFICE O/P EST MOD 30 MIN: CPT | Mod: S$GLB,,, | Performed by: PHYSICIAN ASSISTANT

## 2019-06-23 PROCEDURE — 99214 PR OFFICE/OUTPT VISIT, EST, LEVL IV, 30-39 MIN: ICD-10-PCS | Mod: S$GLB,,, | Performed by: PHYSICIAN ASSISTANT

## 2019-06-23 RX ORDER — PREDNISONE 10 MG/1
TABLET ORAL
Qty: 18 TABLET | Refills: 0 | Status: SHIPPED | OUTPATIENT
Start: 2019-06-23 | End: 2019-09-10

## 2019-06-23 RX ORDER — BENZONATATE 200 MG/1
200 CAPSULE ORAL 3 TIMES DAILY PRN
Qty: 60 CAPSULE | Refills: 1 | Status: SHIPPED | OUTPATIENT
Start: 2019-06-23 | End: 2019-07-03

## 2019-06-23 NOTE — PROGRESS NOTES
"Subjective:       Patient ID: Petre Caldwell is a 73 y.o. male.    Vitals:  height is 6' 1" (1.854 m) and weight is 94.8 kg (209 lb). His oral temperature is 98.9 °F (37.2 °C). His blood pressure is 117/78 and his pulse is 69. His respiration is 18 and oxygen saturation is 96%.     Chief Complaint: Sinus Problem (congestion pressure drainage); Cough (purulent productive ); and Eye Burn (crusting and irritation)    Pt returned from 12 day cruise in [place] and came down with respiratory symptoms for almost a week now. His symptoms began as he was returning home. Purulent productive cough, sinus congestion, eye irritation/crusty. Pt says he feels very tired and fatigue now. Currently taking mucinex DM but not much help.    Sinus Problem   This is a new problem. The current episode started in the past 7 days. The problem has been gradually worsening since onset. Associated symptoms include congestion, headaches, shortness of breath and sinus pressure. Pertinent negatives include no chills, coughing, diaphoresis, ear pain or sore throat.   Cough   This is a new problem. The current episode started in the past 7 days. The problem has been gradually worsening. The problem occurs every few minutes. The cough is productive of purulent sputum. Associated symptoms include headaches, nasal congestion, postnasal drip, rhinorrhea and shortness of breath. Pertinent negatives include no chills, ear congestion, ear pain, eye redness, fever, hemoptysis, myalgias, rash, sore throat or wheezing. The symptoms are aggravated by lying down and pollens.       Constitution: Positive for appetite change and fatigue. Negative for chills, sweating and fever.   HENT: Positive for congestion, postnasal drip, sinus pressure and voice change. Negative for ear pain, ear discharge, sinus pain and sore throat.    Neck: Negative for painful lymph nodes.   Eyes: Negative for eye redness.   Respiratory: Positive for shortness of breath. Negative for " chest tightness, cough, sputum production, bloody sputum, COPD, stridor, wheezing and asthma.    Gastrointestinal: Negative for nausea, vomiting, constipation and diarrhea.   Genitourinary: Negative for dysuria, frequency and urgency.   Musculoskeletal: Negative for muscle ache.   Skin: Negative for rash.   Allergic/Immunologic: Negative for seasonal allergies and asthma.   Neurological: Positive for headaches.   Hematologic/Lymphatic: Negative for swollen lymph nodes.       Objective:      Physical Exam   Constitutional: He is oriented to person, place, and time. He appears well-developed and well-nourished. He is cooperative.  Non-toxic appearance. He does not appear ill. No distress.   HENT:   Head: Normocephalic and atraumatic.   Right Ear: Hearing, tympanic membrane, external ear and ear canal normal.   Left Ear: Hearing, tympanic membrane, external ear and ear canal normal.   Nose: Nose normal. No mucosal edema, rhinorrhea or nasal deformity. No epistaxis. Right sinus exhibits no maxillary sinus tenderness and no frontal sinus tenderness. Left sinus exhibits no maxillary sinus tenderness and no frontal sinus tenderness.   Mouth/Throat: Uvula is midline, oropharynx is clear and moist and mucous membranes are normal. No trismus in the jaw. Normal dentition. No uvula swelling. No posterior oropharyngeal erythema.   Eyes: Conjunctivae and lids are normal. No scleral icterus.   Sclera clear bilat   Neck: Trachea normal, full passive range of motion without pain and phonation normal. Neck supple.   Cardiovascular: Normal rate, regular rhythm, normal heart sounds, intact distal pulses and normal pulses.   Pulmonary/Chest: Effort normal. No respiratory distress. He has decreased breath sounds. He has wheezes (diffuse).   Abdominal: Soft. Normal appearance and bowel sounds are normal. He exhibits no distension. There is no tenderness.   Musculoskeletal: Normal range of motion. He exhibits no edema or deformity.    Neurological: He is alert and oriented to person, place, and time. He exhibits normal muscle tone. Coordination normal.   Skin: Skin is warm, dry and intact. He is not diaphoretic. No pallor.   Psychiatric: He has a normal mood and affect. His speech is normal and behavior is normal. Judgment and thought content normal. Cognition and memory are normal.   Nursing note and vitals reviewed.      Assessment:       1. Cough    2. Sinus congestion        Plan:         Cough  -     XR CHEST PA AND LATERAL; Future; Expected date: 06/23/2019  -     predniSONE (DELTASONE) 10 MG tablet; Take two pills po x 5 days, 1 pill po x 5 days, 1/2 pill po until empty. Start 24 hours after injection.  Dispense: 18 tablet; Refill: 0  -     benzonatate (TESSALON) 200 MG capsule; Take 1 capsule (200 mg total) by mouth 3 (three) times daily as needed for Cough.  Dispense: 60 capsule; Refill: 1    Sinus congestion  -     predniSONE (DELTASONE) 10 MG tablet; Take two pills po x 5 days, 1 pill po x 5 days, 1/2 pill po until empty. Start 24 hours after injection.  Dispense: 18 tablet; Refill: 0  -     benzonatate (TESSALON) 200 MG capsule; Take 1 capsule (200 mg total) by mouth 3 (three) times daily as needed for Cough.  Dispense: 60 capsule; Refill: 1    Xr Chest Pa And Lateral    Result Date: 6/23/2019  EXAMINATION: XR CHEST PA AND LATERAL CLINICAL HISTORY: Cough TECHNIQUE: PA and lateral views of the chest were performed. COMPARISON: None FINDINGS: Sternotomy wires and surgical clips overlie the cardiomediastinal silhouette.  There is no lung consolidation.  No pleural effusion or pneumothorax.  Heart size within normal limits without evidence for central pulmonary vascular congestion.  Visualized osseous structures grossly intact.     See above Electronically signed by: Enrrique Medrano DO Date:    06/23/2019 Time:    10:15       I suggest PCP follow up in 7-10 days if no improvement.     Patient Instructions     Bronchitis, Viral  (Adult)    You have a viral bronchitis. Bronchitis is inflammation and swelling of the lining of the lungs. This is often caused by an infection. Symptoms include a dry, hacking cough that is worse at night. The cough may bring up yellow-green mucus. You may also feel short of breath or wheeze. Other symptoms may include tiredness, chest discomfort, and chills.  Bronchitis that is caused by a virus is not treated with antibiotics. Instead, medicines may be given to help relieve symptoms. Symptoms can last up to 2 weeks, although the cough may last much longer.  This illness is contagious during the first few days and is spread through the air by coughing and sneezing, or by direct contact (touching the sick person and then touching your own eyes, nose, or mouth).  Most viral illnesses resolve within 10 to 14 days with rest and simple home remedies, although they may sometimes last for several weeks.  Home care  · If symptoms are severe, rest at home for the first 2 to 3 days. When you go back to your usual activities, don't let yourself get too tired.  · Do not smoke. Also avoid being exposed to secondhand smoke.  · You may use over-the-counter medicine to control fever or pain, unless another pain medicine was prescribed. (Note: If you have chronic liver or kidney disease or have ever had a stomach ulcer or gastrointestinal bleeding, talk with your healthcare provider before using these medicines. Also talk to your provider if you are taking medicine to prevent blood clots.) Aspirin should never be given to anyone younger than 18 years of age who is ill with a viral infection or fever. It may cause severe liver or brain damage.  · Your appetite may be poor, so a light diet is fine. Avoid dehydration by drinking 6 to 8 glasses of fluids per day (such as water, soft drinks, sports drinks, juices, tea, or soup). Extra fluids will help loosen secretions in the nose and lungs.  · Over-the-counter cough, cold, and  sore-throat medicines will not shorten the length of the illness, but they may help to reduce symptoms. (Note: Do not use decongestants if you have high blood pressure.)  Follow-up care  Follow up with your healthcare provider, or as advised. If you had an X-ray or ECG (electrocardiogram), a specialist will review it. You will be notified of any new findings that may affect your care.  Note: If you are age 65 or older, or if you have a chronic lung disease or condition that affects your immune system, or you smoke, talk to your healthcare provider about having pneumococcal vaccinations and a yearly influenza vaccination (flu shot).  When to seek medical advice  Call your healthcare provider right away if any of these occur:  · Fever of 100.4°F (38°C) or higher  · Coughing up increased amounts of colored sputum  · Weakness, drowsiness, headache, facial pain, ear pain, or a stiff neck  Call 911, or get immediate medical care  Contact emergency services right away if any of these occur:  · Coughing up blood  · Worsening weakness, drowsiness, headache, or stiff neck  · Trouble breathing, wheezing, or pain with breathing  Date Last Reviewed: 9/13/2015  © 4049-1474 [employer]. [address] [place] [address]. All rights reserved. This information is not intended as a substitute for professional medical care. Always follow your healthcare professional's instructions.       If not allergic,take tylenol (acetominophen) for fever control, chills, or body aches every 4 hours. Do not exceed 4000 mg/ day.If not allergic, take Motrin (Ibuprofen) every 4 hours for fever, chills, pain or inflammation. Do not exceed 2400 mg/day. You can alternate taking tylenol and motrin.  If you were prescribed a narcotic medication, do not drive or operate heavy equipment or machinery while taking these medications.  You must understand that you've received an Urgent Care treatment only and that you may be released before  all your medical problems are known or treated. You, the patient, will arrange for follow up care as instructed.  Follow up with your PCP or specialty clinic as directed in the next 1-2 weeks if not improved or as needed.  You can call [phone] to schedule an appointment with the appropriate provider.  If your condition worsens we recommend that you receive another evaluation at the emergency room immediately or contact your primary medical clinics after hours call service to discuss your concerns.  Please return here or go to the Emergency Department for any concerns or worsening of condition.

## 2019-06-23 NOTE — PATIENT INSTRUCTIONS
Bronchitis, Viral (Adult)    You have a viral bronchitis. Bronchitis is inflammation and swelling of the lining of the lungs. This is often caused by an infection. Symptoms include a dry, hacking cough that is worse at night. The cough may bring up yellow-green mucus. You may also feel short of breath or wheeze. Other symptoms may include tiredness, chest discomfort, and chills.  Bronchitis that is caused by a virus is not treated with antibiotics. Instead, medicines may be given to help relieve symptoms. Symptoms can last up to 2 weeks, although the cough may last much longer.  This illness is contagious during the first few days and is spread through the air by coughing and sneezing, or by direct contact (touching the sick person and then touching your own eyes, nose, or mouth).  Most viral illnesses resolve within 10 to 14 days with rest and simple home remedies, although they may sometimes last for several weeks.  Home care  · If symptoms are severe, rest at home for the first 2 to 3 days. When you go back to your usual activities, don't let yourself get too tired.  · Do not smoke. Also avoid being exposed to secondhand smoke.  · You may use over-the-counter medicine to control fever or pain, unless another pain medicine was prescribed. (Note: If you have chronic liver or kidney disease or have ever had a stomach ulcer or gastrointestinal bleeding, talk with your healthcare provider before using these medicines. Also talk to your provider if you are taking medicine to prevent blood clots.) Aspirin should never be given to anyone younger than 18 years of age who is ill with a viral infection or fever. It may cause severe liver or brain damage.  · Your appetite may be poor, so a light diet is fine. Avoid dehydration by drinking 6 to 8 glasses of fluids per day (such as water, soft drinks, sports drinks, juices, tea, or soup). Extra fluids will help loosen secretions in the nose and lungs.  · Over-the-counter  cough, cold, and sore-throat medicines will not shorten the length of the illness, but they may help to reduce symptoms. (Note: Do not use decongestants if you have high blood pressure.)  Follow-up care  Follow up with your healthcare provider, or as advised. If you had an X-ray or ECG (electrocardiogram), a specialist will review it. You will be notified of any new findings that may affect your care.  Note: If you are age 65 or older, or if you have a chronic lung disease or condition that affects your immune system, or you smoke, talk to your healthcare provider about having pneumococcal vaccinations and a yearly influenza vaccination (flu shot).  When to seek medical advice  Call your healthcare provider right away if any of these occur:  · Fever of 100.4°F (38°C) or higher  · Coughing up increased amounts of colored sputum  · Weakness, drowsiness, headache, facial pain, ear pain, or a stiff neck  Call 911, or get immediate medical care  Contact emergency services right away if any of these occur:  · Coughing up blood  · Worsening weakness, drowsiness, headache, or stiff neck  · Trouble breathing, wheezing, or pain with breathing  Date Last Reviewed: 9/13/2015 © 2000-2017 [employer]. [address] [place] [address], [place] [address]. All rights reserved. This information is not intended as a substitute for professional medical care. Always follow your healthcare professional's instructions.       If not allergic,take tylenol (acetominophen) for fever control, chills, or body aches every 4 hours. Do not exceed 4000 mg/ day.If not allergic, take Motrin (Ibuprofen) every 4 hours for fever, chills, pain or inflammation. Do not exceed 2400 mg/day. You can alternate taking tylenol and motrin.  If you were prescribed a narcotic medication, do not drive or operate heavy equipment or machinery while taking these medications.  You must understand that you've received an Urgent Care treatment only and that you may  be released before all your medical problems are known or treated. You, the patient, will arrange for follow up care as instructed.  Follow up with your PCP or specialty clinic as directed in the next 1-2 weeks if not improved or as needed.  You can call [phone] to schedule an appointment with the appropriate provider.  If your condition worsens we recommend that you receive another evaluation at the emergency room immediately or contact your primary medical clinics after hours call service to discuss your concerns.  Please return here or go to the Emergency Department for any concerns or worsening of condition.

## 2019-06-26 ENCOUNTER — TELEPHONE (OUTPATIENT)
Dept: URGENT CARE | Facility: CLINIC | Age: 74
End: 2019-06-26

## 2019-07-01 ENCOUNTER — PATIENT OUTREACH (OUTPATIENT)
Dept: OTHER | Facility: OTHER | Age: 74
End: 2019-07-01

## 2019-07-01 NOTE — PROGRESS NOTES
Last 5 Patient Entered Readings                                      Current 30 Day Average: 123/75     Recent Readings 6/30/2019 6/24/2019 6/24/2019 6/23/2019 6/22/2019    SBP (mmHg) 125 132 134 115 132    DBP (mmHg) 74 79 81 70 77    Pulse 75 71 73 75 83          Digital Medicine: Health  Follow Up    7/1: Left voicemail to follow up with Mr. Peter Caldwell.  Current BP average 123/75 mmHg is at goal, <130/80 mmHg.  BP is well controlled.

## 2019-07-02 NOTE — PROGRESS NOTES
Last 5 Patient Entered Readings                                      Current 30 Day Average: 123/75     Recent Readings 6/30/2019 6/24/2019 6/24/2019 6/23/2019 6/22/2019    SBP (mmHg) 125 132 134 115 132    DBP (mmHg) 74 79 81 70 77    Pulse 75 71 73 75 83        Digital Medicine: Health  Follow Up    Patient reports he has been dealing with a viral infection in his lungs upon returning from his trip.  Patient reports he is starting to feel better.  Per chart, patient has started prednisone on 6/23.  Gave patient encouragement and will follow up in 4-6 weeks.  BP is still controlled.     Lifestyle Modifications:    1.Dietary Modifications (Sodium intake <2,000mg/day, food labels, dining out):   Deferred    2.Physical Activity:   Deferred    3.Medication Therapy:   Patient has been compliant with the medication regimen.    4.Patient has the following medication side effects/concerns: None  (Frequency/Alleviating factors/Precipitating factors, etc.)     Follow up with Mr. Peter Caldwell completed. No further questions or concerns. Will continue to follow up to achieve health goals.  Patient is currently at goal, 123/75 mmHg does not exceed <130/80 mmHg.

## 2019-07-10 RX ORDER — ATORVASTATIN CALCIUM 80 MG/1
80 TABLET, FILM COATED ORAL NIGHTLY
Qty: 90 TABLET | Refills: 3 | Status: SHIPPED | OUTPATIENT
Start: 2019-07-10 | End: 2019-10-15 | Stop reason: SDUPTHER

## 2019-07-15 ENCOUNTER — LAB VISIT (OUTPATIENT)
Dept: LAB | Facility: HOSPITAL | Age: 74
End: 2019-07-15
Attending: UROLOGY
Payer: MEDICARE

## 2019-07-15 DIAGNOSIS — R97.20 ELEVATED PSA: ICD-10-CM

## 2019-07-15 LAB — COMPLEXED PSA SERPL-MCNC: 9.9 NG/ML (ref 0–4)

## 2019-07-15 PROCEDURE — 84153 ASSAY OF PSA TOTAL: CPT

## 2019-07-15 PROCEDURE — 36415 COLL VENOUS BLD VENIPUNCTURE: CPT | Mod: PO

## 2019-07-21 ENCOUNTER — PATIENT MESSAGE (OUTPATIENT)
Dept: ADMINISTRATIVE | Facility: OTHER | Age: 74
End: 2019-07-21

## 2019-07-30 ENCOUNTER — LAB VISIT (OUTPATIENT)
Dept: LAB | Facility: HOSPITAL | Age: 74
End: 2019-07-30
Attending: UROLOGY
Payer: MEDICARE

## 2019-07-30 ENCOUNTER — OFFICE VISIT (OUTPATIENT)
Dept: UROLOGY | Facility: CLINIC | Age: 74
End: 2019-07-30
Payer: MEDICARE

## 2019-07-30 VITALS — SYSTOLIC BLOOD PRESSURE: 127 MMHG | DIASTOLIC BLOOD PRESSURE: 68 MMHG | HEART RATE: 64 BPM

## 2019-07-30 DIAGNOSIS — R97.20 ELEVATED PSA: Primary | ICD-10-CM

## 2019-07-30 DIAGNOSIS — N40.1 BENIGN PROSTATIC HYPERPLASIA WITH URINARY OBSTRUCTION: ICD-10-CM

## 2019-07-30 DIAGNOSIS — R97.20 ELEVATED PSA: ICD-10-CM

## 2019-07-30 DIAGNOSIS — N13.8 BENIGN PROSTATIC HYPERPLASIA WITH URINARY OBSTRUCTION: ICD-10-CM

## 2019-07-30 LAB — COMPLEXED PSA SERPL-MCNC: 8 NG/ML (ref 0–4)

## 2019-07-30 PROCEDURE — 99214 PR OFFICE/OUTPT VISIT, EST, LEVL IV, 30-39 MIN: ICD-10-PCS | Mod: S$PBB,,, | Performed by: UROLOGY

## 2019-07-30 PROCEDURE — 99999 PR PBB SHADOW E&M-EST. PATIENT-LVL III: ICD-10-PCS | Mod: PBBFAC,,, | Performed by: UROLOGY

## 2019-07-30 PROCEDURE — 99999 PR PBB SHADOW E&M-EST. PATIENT-LVL III: CPT | Mod: PBBFAC,,, | Performed by: UROLOGY

## 2019-07-30 PROCEDURE — 99214 OFFICE O/P EST MOD 30 MIN: CPT | Mod: S$PBB,,, | Performed by: UROLOGY

## 2019-07-30 PROCEDURE — 84153 ASSAY OF PSA TOTAL: CPT

## 2019-07-30 PROCEDURE — 99213 OFFICE O/P EST LOW 20 MIN: CPT | Mod: PBBFAC | Performed by: UROLOGY

## 2019-07-30 PROCEDURE — 36415 COLL VENOUS BLD VENIPUNCTURE: CPT

## 2019-07-30 NOTE — PROGRESS NOTES
Clinic Note  7/30/2019      Subjective:         Chief Complaint:   HPI  Peter Caldwell is a 73 y.o. male with a history of elevated PSA. Last saw Dr. Robles in 2012. History of BPH on Flomax. Consult from Dr. Cardoso.  No prior TRUS or biopsy. 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  Negative family history. Flomax for LUTS          Lab Results   Component Value Date    PSA 4.3 (H) 07/13/2016    PSA 3.9 03/18/2015    PSA 4.35 (H) 03/12/2013    PSA 4.15 (H) 12/12/2012    PSA 2.93 09/10/2012    PSA 4.18 (H) 06/07/2012    PSA 3.1 05/17/2011    PSA 2.6 12/15/2009    PSA 2.0 02/04/2005    PSADIAG 9.9 (H) 07/15/2019    PSADIAG 6.5 (H) 01/11/2019    PSADIAG 5.0 (H) 04/18/2017    PSADIAG 5.6 (H) 10/28/2016    PSADIAG 3.2 01/21/2014      Past Medical History:   Diagnosis Date    AAA (abdominal aortic aneurysm)     Arthritis     BPH (benign prostatic hyperplasia)     CAD (coronary artery disease)     khuri    Cataract     Depression     Elevated PSA     dr robles    Ex-smoker     Hyperlipidemia     Hypertension     Mood disorder     chalasani    ELINA (obstructive sleep apnea)     Osteopenia 4/15 aylin 4/20    Other insomnia 1/7/2019    S/P CABG (coronary artery bypass graft) 8/9/2013    S/P PTCA (percutaneous transluminal coronary angioplasty) 8/9/2013     Family History   Problem Relation Age of Onset    Coronary artery disease Mother     Diabetes Mother     Heart disease Mother     Coronary artery disease Father     Heart disease Father     Heart disease Brother     Heart disease Brother     Amblyopia Neg Hx     Blindness Neg Hx     Cataracts Neg Hx     Glaucoma Neg Hx     Macular degeneration Neg Hx     Retinal detachment Neg Hx     Strabismus Neg Hx      Social History     Socioeconomic History    Marital status:      Spouse name: Not on file    Number of children: 2    Years of education: Not on file    Highest education level: Not on file   Occupational History    Not on  file   Social Needs    Financial resource strain: Not on file    Food insecurity:     Worry: Not on file     Inability: Not on file    Transportation needs:     Medical: Not on file     Non-medical: Not on file   Tobacco Use    Smoking status: Former Smoker    Smokeless tobacco: Never Used   Substance and Sexual Activity    Alcohol use: Yes     Alcohol/week: 0.6 oz     Types: 1 Glasses of wine per week     Comment: maybe every 6 months    Drug use: No    Sexual activity: Yes   Lifestyle    Physical activity:     Days per week: Not on file     Minutes per session: Not on file    Stress: Not on file   Relationships    Social connections:     Talks on phone: Not on file     Gets together: Not on file     Attends Taoist service: Not on file     Active member of club or organization: Not on file     Attends meetings of clubs or organizations: Not on file     Relationship status: Not on file   Other Topics Concern    Not on file   Social History Narrative    Not on file     Past Surgical History:   Procedure Laterality Date    CATARACT EXTRACTION W/  INTRAOCULAR LENS IMPLANT Left 07/26/2016    Dr. Norman    CATARACT EXTRACTION W/  INTRAOCULAR LENS IMPLANT Right 08/09/2016    Dr. Norman    CHOLECYSTECTOMY      COLONOSCOPY N/A 3/7/2014    [facility]    CORONARY ANGIOPLASTY      CORONARY ARTERY BYPASS GRAFT      HERNIA REPAIR      INSERTION-INTRAOCULAR LENS (IOL) Right 8/9/2016    [facility]    INSERTION-INTRAOCULAR LENS (IOL) Left 7/26/2016    [facility] at [facility]    PHACOEMULSIFICATION-ASPIRATION-CATARACT Right 8/9/2016    [facility]    PHACOEMULSIFICATION-ASPIRATION-CATARACT Left 7/26/2016    [facility] at [facility]    ptca      rca     Patient Active Problem List   Diagnosis     "Hypertension    CAD (coronary artery disease)    ELINA (obstructive sleep apnea)    Hyperlipidemia    BPH (benign prostatic hyperplasia)    Mood disorder    S/P CABG (coronary artery bypass graft)    S/P PTCA (percutaneous transluminal coronary angioplasty)    Osteopenia    AAA (abdominal aortic aneurysm)    Nuclear sclerosis of both eyes    Vitreous detachment of right eye    Refractive error    Senile nuclear sclerosis    Nuclear sclerosis of right eye    Elevated PSA    Short-term memory loss    Decreased hearing of both ears    Other insomnia    Right hip pain    Situational depression     Review of Systems   Constitutional: Negative for appetite change, chills, fatigue, fever and unexpected weight change.   HENT: Negative for nosebleeds.    Respiratory: Negative for shortness of breath and wheezing.    Cardiovascular: Negative for chest pain, palpitations and leg swelling.   Gastrointestinal: Negative for abdominal distention, abdominal pain, constipation, diarrhea, nausea and vomiting.   Genitourinary: Negative for dysuria and hematuria.   Musculoskeletal: Negative for arthralgias and back pain.   Skin: Negative for pallor.   Neurological: Negative for dizziness, seizures and syncope.   Hematological: Negative for adenopathy.   Psychiatric/Behavioral: Negative for dysphoric mood.         Objective:      /68   Pulse 64   Estimated body mass index is 27.57 kg/m² as calculated from the following:    Height as of 6/23/19: 6' 1" (1.854 m).    Weight as of 6/23/19: 94.8 kg (209 lb).  Physical Exam   Constitutional: He is oriented to person, place, and time. He appears well-developed and well-nourished. No distress.   HENT:   Head: Atraumatic.   Neck: No tracheal deviation present.   Cardiovascular: Normal rate.    Pulmonary/Chest: Effort normal. No respiratory distress. He has no wheezes.   Abdominal: Soft. Bowel sounds are normal. He exhibits no distension and no mass. There is no " tenderness. There is no rebound and no guarding.   Genitourinary: Rectum normal and prostate normal. Rectal exam shows no external hemorrhoid, no internal hemorrhoid, no mass and no tenderness.   Neurological: He is alert and oriented to person, place, and time.   Skin: Skin is warm and dry. He is not diaphoretic.     Psychiatric: He has a normal mood and affect. His behavior is normal. Judgment and thought content normal.         Assessment and Plan:           Problem List Items Addressed This Visit     None          Follow up:   Recommend TRUS biopsy.  Patient would like repeat PSA. Will call after results back.    Pawel Salinas

## 2019-07-31 ENCOUNTER — PATIENT MESSAGE (OUTPATIENT)
Dept: UROLOGY | Facility: CLINIC | Age: 74
End: 2019-07-31

## 2019-07-31 DIAGNOSIS — R97.20 ELEVATED PSA: Primary | ICD-10-CM

## 2019-08-07 ENCOUNTER — OFFICE VISIT (OUTPATIENT)
Dept: OTOLARYNGOLOGY | Facility: CLINIC | Age: 74
End: 2019-08-07
Payer: MEDICARE

## 2019-08-07 ENCOUNTER — CLINICAL SUPPORT (OUTPATIENT)
Dept: AUDIOLOGY | Facility: CLINIC | Age: 74
End: 2019-08-07
Payer: MEDICARE

## 2019-08-07 VITALS
HEART RATE: 53 BPM | WEIGHT: 205 LBS | DIASTOLIC BLOOD PRESSURE: 72 MMHG | BODY MASS INDEX: 27.05 KG/M2 | SYSTOLIC BLOOD PRESSURE: 118 MMHG

## 2019-08-07 DIAGNOSIS — H91.93 BILATERAL HEARING LOSS, UNSPECIFIED HEARING LOSS TYPE: ICD-10-CM

## 2019-08-07 DIAGNOSIS — H90.3 SENSORINEURAL HEARING LOSS (SNHL) OF BOTH EARS: Primary | ICD-10-CM

## 2019-08-07 DIAGNOSIS — H93.13 TINNITUS, BILATERAL: ICD-10-CM

## 2019-08-07 DIAGNOSIS — H90.3 SENSORINEURAL HEARING LOSS, BILATERAL: Primary | ICD-10-CM

## 2019-08-07 PROCEDURE — 99213 OFFICE O/P EST LOW 20 MIN: CPT | Mod: PBBFAC,27,25 | Performed by: NURSE PRACTITIONER

## 2019-08-07 PROCEDURE — 99999 PR PBB SHADOW E&M-EST. PATIENT-LVL I: CPT | Mod: PBBFAC,,,

## 2019-08-07 PROCEDURE — 92557 COMPREHENSIVE HEARING TEST: CPT | Mod: PBBFAC | Performed by: AUDIOLOGIST

## 2019-08-07 PROCEDURE — 99999 PR PBB SHADOW E&M-EST. PATIENT-LVL III: CPT | Mod: PBBFAC,,, | Performed by: NURSE PRACTITIONER

## 2019-08-07 PROCEDURE — 99213 OFFICE O/P EST LOW 20 MIN: CPT | Mod: S$PBB,ICN,, | Performed by: NURSE PRACTITIONER

## 2019-08-07 PROCEDURE — 99999 PR PBB SHADOW E&M-EST. PATIENT-LVL III: ICD-10-PCS | Mod: PBBFAC,,, | Performed by: NURSE PRACTITIONER

## 2019-08-07 PROCEDURE — 99213 PR OFFICE/OUTPT VISIT, EST, LEVL III, 20-29 MIN: ICD-10-PCS | Mod: S$PBB,ICN,, | Performed by: NURSE PRACTITIONER

## 2019-08-07 PROCEDURE — 99999 PR PBB SHADOW E&M-EST. PATIENT-LVL I: ICD-10-PCS | Mod: PBBFAC,,,

## 2019-08-07 PROCEDURE — 92567 TYMPANOMETRY: CPT | Mod: PBBFAC | Performed by: AUDIOLOGIST

## 2019-08-07 PROCEDURE — 99211 OFF/OP EST MAY X REQ PHY/QHP: CPT | Mod: PBBFAC,25

## 2019-08-07 NOTE — PROGRESS NOTES
Peter Caldwell was seen today for a hearing evaluation.     Pure tone audiometry revealed a mild - severe SNHL for the right ear; mild-moderately severe SNHL for the left ear  SRT and PTA are in good agreement bilaterally.  Fair speech discrimination for the right ear: Good for the left ear   Tympanometry revealed Type A, bilaterally      Recommendations:  1. Otologic Evaluation  2. Annual Audiogram or repeat audiogram as needed  3. Hearing Protection  4. Hearing Aid Consult - Patient is a . [facility]

## 2019-08-07 NOTE — PROGRESS NOTES
Subjective:      Peter Caldwell is a 73 y.o. male who was self-referred for tinnitus.    Mr. Caldwell reports ringing in both ears for the past several years. He has associated decrease hearing in both ears. He denies ear pain or drainage. He has noticed that he has to ask to repeat themselves or speak louder to hear. There is not a family history of hearing loss at a young age.  There is not a prior history of ear surgery.  There is not a prior history of ear infections .  He admits to a history of significant noise exposure, work in [employer].  He does not wear hearing aids currently.  He has not had a hearing test recently.  ETDQ score 1.9 today.      Past Medical History  He has a past medical history of AAA (abdominal aortic aneurysm), Arthritis, BPH (benign prostatic hyperplasia), CAD (coronary artery disease), Cataract, Depression, Elevated PSA, Ex-smoker, Hyperlipidemia, Hypertension, Mood disorder, ELINA (obstructive sleep apnea), Osteopenia, Other insomnia, S/P CABG (coronary artery bypass graft), and S/P PTCA (percutaneous transluminal coronary angioplasty).    Past Surgical History  He has a past surgical history that includes Coronary artery bypass graft; ptca; Hernia repair; Cholecystectomy; Coronary angioplasty; Cataract extraction w/  intraocular lens implant (Left, 07/26/2016); and Cataract extraction w/  intraocular lens implant (Right, 08/09/2016).    Family History  His family history includes Coronary artery disease in his father and mother; Diabetes in his mother; Heart disease in his brother, brother, father, and mother.    Social History  He reports that he has quit smoking. He has never used smokeless tobacco. He reports that he drinks about 0.6 oz of alcohol per week. He reports that he does not use drugs.    Allergies  He is allergic to penicillins; promethazine; and percodan [oxycodone hcl-oxycodone-asa].    Medications  He has a current medication list which includes the following  prescription(s): aspirin, atorvastatin, fluticasone propionate, ibuprofen, metoprolol succinate, prednisone, and tamsulosin.    Review of Systems   Constitutional: Negative for chills, fever and unexpected weight change.   HENT: Positive for hearing loss and tinnitus. Negative for ear discharge, ear pain, sore throat and trouble swallowing.    Eyes: Negative for pain and visual disturbance.   Respiratory: Negative for apnea and shortness of breath.    Cardiovascular: Negative for chest pain and palpitations.   Gastrointestinal: Negative for abdominal pain and nausea.   Endocrine: Negative for cold intolerance and heat intolerance.   Musculoskeletal: Negative for joint swelling and neck stiffness.   Skin: Negative for color change and rash.   Neurological: Negative for dizziness, facial asymmetry and headaches.   Hematological: Negative for adenopathy. Does not bruise/bleed easily.   Psychiatric/Behavioral: Negative for agitation and sleep disturbance. The patient is not nervous/anxious.           Objective:     /72   Pulse (!) 53   Wt 93 kg (205 lb)   BMI 27.05 kg/m²      Constitutional:   Vital signs are normal. He appears well-developed and well-nourished.     Head:  Normocephalic and atraumatic.     Ears:    Right Ear: No lacerations. No drainage, swelling or tenderness. No foreign bodies. No mastoid tenderness. Tympanic membrane is not injected, not scarred, not perforated, not erythematous, not retracted and not bulging. Tympanic membrane mobility is normal. No middle ear effusion. No hemotympanum. Decreased hearing is noted.   Left Ear: No lacerations. No drainage, swelling or tenderness. No foreign bodies. No mastoid tenderness. Tympanic membrane is not injected, not scarred, not perforated, not erythematous, not retracted and not bulging. Tympanic membrane mobility is normal.  No middle ear effusion. No hemotympanum. Decreased hearing is noted.     Nose:  Nose normal including turbinates, nasal  mucosa, sinuses and nasal septum.     Mouth/Throat  Lips, teeth, and gums normal and oropharynx normal.     Neck:  Neck normal without thyromegaly masses, asymmetry, normal tracheal structure, crepitus, and tenderness.        Head (right side): Submental adenopathy present.        Head (left side): Submental adenopathy present.     Cardiovascular:   Normal heart sounds and normal pulses.      Pulmonary/Chest:   Effort normal and breath sounds normal.     Psychiatric:   He has a normal mood and affect.       Procedure    None        Data Reviewed    WBC (K/uL)   Date Value   06/02/2009 6.15     Platelets (K/uL)   Date Value   06/02/2009 197      Creatinine (mg/dL)   Date Value   08/06/2018 1.0     TSH (uIU/ml)   Date Value   05/13/2005 2.1     Glucose (mg/dL)   Date Value   08/06/2018 105     No results found for: HGBA1C    I independently reviewed the tracings of the complete audiometric evaluation performed today.  I reviewed the audiogram with the patient as well.  Pertinent findings include bilateral HF SNHL (R>L) with normal tymps and symmetric SRT at 20 db. right discrimination 76% at 75 db. Left discrimination 84% at 75 db..         Assessment:     1. Sensorineural hearing loss (SNHL) of both ears    2. Tinnitus, bilateral    3. Bilateral hearing loss, unspecified hearing loss type         Plan:     I had a long discussion with the patient regarding his condition and the further workup and management options.    Mr. Caldwell has bilateral High frequency SNHL. He would benefit with hearing amplification which may also improved tinnitus symptoms.  I recommend hearing aid evaluation. Narda Linares's card provided to patient.  Follow up in one year with audiogram.      Follow up in about 1 year (around 8/7/2020).

## 2019-08-07 NOTE — PATIENT INSTRUCTIONS
Tinnitus (Ringing in the Ears)     Treatment may include maskers and hearing aids.     Tinnitus is the term for a noise in your ear not caused by an outside sound. The noise might be a ringing, clicking, hiss, or roar. It can vary in pitch and may be soft or quite loud. For some people, tinnitus is a minor nuisance. But for others, the noise can make it hard to hear, work, and even sleep. When tinnitus can't be cured, a number of treatments may offer relief.  What causes tinnitus?  Loud noises, hearing loss, and ear wax can cause tinnitus. So can certain medicines. Large amounts of aspirin or caffeine are sometimes to blame. In many cases, the exact cause of tinnitus is unknown.  How is tinnitus treated?  Identifying and removing the cause is the best way to treat tinnitus. For that reason, your healthcare provider may refer you to an otolaryngologist (ear, nose, and throat doctor). Your hearing may also be checked by an audiologist (hearing specialist). If you have hearing loss, wearing a hearing aid may help your tinnitus. When the cause can't be found, the tinnitus itself may be treated. Some of the treatments are listed below, and your healthcare provider can tell you more about them:  · Maskers are small devices that look like hearing aids. They emit a pleasant sound that helps cover up the ringing in your ears. Hearing aids and maskers are sometimes used together.  · Medicines that treat anxiety and depression may ease tinnitus in some people.  · Hypnosis or relaxation therapy may help head noise seem less severe.  · Tinnitus retraining therapy combines counseling and maskers. Both can help take your mind off the tinnitus.  For more information  · American Speech-Hearing-Language Association 083-603-8789 www.amanda.org  · American Tinnitus Association 191-390-7983 www.jose l.org  · National [place] on Deafness and other Communication Disorders 066-578-5426 www.nidcd.nih.gov   Date Last Reviewed: 7/1/2016  ©  9417-3126 [employer]. [address] [place] [address], [place] [address]. All rights reserved. This information is not intended as a substitute for professional medical care. Always follow your healthcare professional's instructions.

## 2019-08-12 ENCOUNTER — PATIENT OUTREACH (OUTPATIENT)
Dept: OTHER | Facility: OTHER | Age: 74
End: 2019-08-12

## 2019-08-12 NOTE — PROGRESS NOTES
Last 5 Patient Entered Readings                                      Current 30 Day Average: 119/73     Recent Readings 8/11/2019 8/8/2019 8/8/2019 8/5/2019 8/5/2019    SBP (mmHg) 119 122 130 118 113    DBP (mmHg) 66 68 68 78 80    Pulse 64 66 68 63 65        Digital Medicine: Health  Follow Up    Patient reports he has lost 12lbs and has been feeling lethargic/fatigued.  Patient is unsure if it related to his viral infection in his lungs.  Patient is also dealing with some other health issues.  Patient reports his BP numbers are very encouraging.  BP is well controlled and patient was grateful for the follow up call.     Lifestyle Modifications:    1.Dietary Modifications (Sodium intake <2,000mg/day, food labels, dining out):   Deferred    2.Physical Activity:   Deferred    3.Medication Therapy:   Patient has been compliant with the medication regimen.    4.Patient has the following medication side effects/concerns: None  (Frequency/Alleviating factors/Precipitating factors, etc.)     Follow up with Mr. Peter Caldwell completed. No further questions or concerns. Will continue to follow up to achieve health goals.  Patient is currently not at goal, 119/73 mmHg does exceed <130/80 mmHg.

## 2019-08-20 ENCOUNTER — PATIENT MESSAGE (OUTPATIENT)
Dept: UROLOGY | Facility: CLINIC | Age: 74
End: 2019-08-20

## 2019-08-20 DIAGNOSIS — R97.20 ELEVATED PSA: Primary | ICD-10-CM

## 2019-08-20 RX ORDER — CIPROFLOXACIN 500 MG/1
500 TABLET ORAL 2 TIMES DAILY
Qty: 6 TABLET | Refills: 0 | Status: SHIPPED | OUTPATIENT
Start: 2019-08-20 | End: 2019-08-23

## 2019-08-22 ENCOUNTER — PROCEDURE VISIT (OUTPATIENT)
Dept: UROLOGY | Facility: CLINIC | Age: 74
End: 2019-08-22
Payer: MEDICARE

## 2019-08-22 VITALS
SYSTOLIC BLOOD PRESSURE: 124 MMHG | TEMPERATURE: 98 F | HEIGHT: 73 IN | RESPIRATION RATE: 18 BRPM | DIASTOLIC BLOOD PRESSURE: 64 MMHG | HEART RATE: 59 BPM | BODY MASS INDEX: 27.47 KG/M2 | WEIGHT: 207.25 LBS

## 2019-08-22 DIAGNOSIS — R97.20 ELEVATED PSA: Primary | ICD-10-CM

## 2019-08-22 PROCEDURE — 55700 TRANSRECTAL ULTRASOUND W/ BIOPSY: CPT | Mod: PBBFAC | Performed by: UROLOGY

## 2019-08-22 PROCEDURE — 55700 TRANSRECTAL ULTRASOUND W/ BIOPSY: ICD-10-PCS | Mod: S$PBB,,, | Performed by: UROLOGY

## 2019-08-22 PROCEDURE — 76872 TRANSRECTAL ULTRASOUND W/ BIOPSY: ICD-10-PCS | Mod: 26,S$PBB,, | Performed by: UROLOGY

## 2019-08-22 PROCEDURE — 88342 TISSUE SPECIMEN TO PATHOLOGY, UROLOGY: ICD-10-PCS | Mod: 26,,, | Performed by: PATHOLOGY

## 2019-08-22 PROCEDURE — 76872 US TRANSRECTAL: CPT | Mod: 26,S$PBB,, | Performed by: UROLOGY

## 2019-08-22 PROCEDURE — 88341 IMHCHEM/IMCYTCHM EA ADD ANTB: CPT | Mod: 26,,, | Performed by: PATHOLOGY

## 2019-08-22 PROCEDURE — 88305 TISSUE EXAM BY PATHOLOGIST: CPT | Mod: 26,,, | Performed by: PATHOLOGY

## 2019-08-22 PROCEDURE — 88342 IMHCHEM/IMCYTCHM 1ST ANTB: CPT | Mod: 26,,, | Performed by: PATHOLOGY

## 2019-08-22 PROCEDURE — 88341 PR IHC OR ICC EACH ADD'L SINGLE ANTIBODY  STAINPR: ICD-10-PCS | Mod: 26,,, | Performed by: PATHOLOGY

## 2019-08-22 PROCEDURE — 88305 TISSUE SPECIMEN TO PATHOLOGY, UROLOGY: ICD-10-PCS | Mod: 26,,, | Performed by: PATHOLOGY

## 2019-08-22 PROCEDURE — 88305 TISSUE EXAM BY PATHOLOGIST: CPT | Performed by: PATHOLOGY

## 2019-08-22 RX ORDER — LIDOCAINE HYDROCHLORIDE 20 MG/ML
JELLY TOPICAL ONCE
Status: COMPLETED | OUTPATIENT
Start: 2019-08-22 | End: 2019-08-22

## 2019-08-22 RX ORDER — LIDOCAINE HYDROCHLORIDE 10 MG/ML
20 INJECTION INFILTRATION; PERINEURAL
Status: COMPLETED | OUTPATIENT
Start: 2019-08-22 | End: 2019-08-22

## 2019-08-22 RX ADMIN — LIDOCAINE HYDROCHLORIDE 20 ML: 10 INJECTION INFILTRATION; PERINEURAL at 01:08

## 2019-08-22 RX ADMIN — LIDOCAINE HYDROCHLORIDE: 20 JELLY TOPICAL at 01:08

## 2019-08-22 NOTE — PROCEDURES
Transrectal ultrasound w/ biopsy  Date/Time: 8/22/2019 2:06 PM  Performed by: Pawel Salinas MD  Authorized by: Pawel Salinas MD     Consent Done?:  Yes (Written)  Indications: Elevated PSA    Preparation: Patient was prepped and draped in usual sterile fashion    Position:  Left lateral  Anesthesia:  Pudendal nerve block, 20cc's 1% Lidocaine and Lidocaine jelly  Patient sedated: No    Prostate Size:  135 ccs  Left [place] Biopsies: 2  Left Mid Biopsies: 2  Left [place] Biopsies: 2  Right Base Biopsies: 2  Right Mid Biopsies: 2  Right [place] Biopsies: 2  Transitional zone: No    Total Biopsies:  12    Patient tolerance:  Patient tolerated the procedure well with no immediate complications     6 months with PSA.

## 2019-08-22 NOTE — PATIENT INSTRUCTIONS

## 2019-09-10 ENCOUNTER — OFFICE VISIT (OUTPATIENT)
Dept: UROLOGY | Facility: CLINIC | Age: 74
End: 2019-09-10
Payer: MEDICARE

## 2019-09-10 VITALS
HEART RATE: 61 BPM | WEIGHT: 203.69 LBS | DIASTOLIC BLOOD PRESSURE: 62 MMHG | SYSTOLIC BLOOD PRESSURE: 109 MMHG | BODY MASS INDEX: 26.99 KG/M2 | HEIGHT: 73 IN

## 2019-09-10 DIAGNOSIS — D49.59 NEOPLASM OF PROSTATE: ICD-10-CM

## 2019-09-10 DIAGNOSIS — C61 PROSTATE CANCER: Primary | ICD-10-CM

## 2019-09-10 PROCEDURE — 99215 PR OFFICE/OUTPT VISIT, EST, LEVL V, 40-54 MIN: ICD-10-PCS | Mod: S$PBB,,, | Performed by: UROLOGY

## 2019-09-10 PROCEDURE — 99213 OFFICE O/P EST LOW 20 MIN: CPT | Mod: PBBFAC | Performed by: UROLOGY

## 2019-09-10 PROCEDURE — 99215 OFFICE O/P EST HI 40 MIN: CPT | Mod: S$PBB,,, | Performed by: UROLOGY

## 2019-09-10 PROCEDURE — 99999 PR PBB SHADOW E&M-EST. PATIENT-LVL III: ICD-10-PCS | Mod: PBBFAC,,, | Performed by: UROLOGY

## 2019-09-10 PROCEDURE — 99999 PR PBB SHADOW E&M-EST. PATIENT-LVL III: CPT | Mod: PBBFAC,,, | Performed by: UROLOGY

## 2019-09-10 NOTE — PROGRESS NOTES
Clinic Note  9/10/2019      Subjective:         Chief Complaint:   HPI  Peter Caldwell is a 73 y.o. male with a history of elevated PSA. Last saw Dr. Robles in 2012. History of BPH on Flomax. Consult from Dr. Cardoso.  No prior TRUS or biopsy. 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  Negative family history. Flomax for LUTS.    Stage- T1C  PSA 8  PSAD- .057  Biopsy- Mary 6 left apex 1/2, 7.7%.  Volume 138 ccs   SILVANA score- 2, low risk   NCCN- very low       Lab Results   Component Value Date    PSA 4.3 (H) 07/13/2016    PSA 3.9 03/18/2015    PSA 4.35 (H) 03/12/2013    PSA 4.15 (H) 12/12/2012    PSA 2.93 09/10/2012    PSA 4.18 (H) 06/07/2012    PSA 3.1 05/17/2011    PSA 2.6 12/15/2009    PSA 2.0 02/04/2005    PSADIAG 8.0 (H) 07/30/2019    PSADIAG 9.9 (H) 07/15/2019    PSADIAG 6.5 (H) 01/11/2019    PSADIAG 5.0 (H) 04/18/2017    PSADIAG 5.6 (H) 10/28/2016    PSADIAG 3.2 01/21/2014      Past Medical History:   Diagnosis Date    AAA (abdominal aortic aneurysm)     Arthritis     BPH (benign prostatic hyperplasia)     CAD (coronary artery disease)     khuri    Cataract     Depression     Elevated PSA     dr robles    Ex-smoker     Hyperlipidemia     Hypertension     Mood disorder     chalasani    LEINA (obstructive sleep apnea)     Osteopenia 4/15 aylin 4/20    Other insomnia 1/7/2019    S/P CABG (coronary artery bypass graft) 8/9/2013    S/P PTCA (percutaneous transluminal coronary angioplasty) 8/9/2013     Family History   Problem Relation Age of Onset    Coronary artery disease Mother     Diabetes Mother     Heart disease Mother     Coronary artery disease Father     Heart disease Father     Heart disease Brother     Heart disease Brother     Amblyopia Neg Hx     Blindness Neg Hx     Cataracts Neg Hx     Glaucoma Neg Hx     Macular degeneration Neg Hx     Retinal detachment Neg Hx     Strabismus Neg Hx      Social History     Socioeconomic History    Marital status:       Spouse name: Not on file    Number of children: 2    Years of education: Not on file    Highest education level: Not on file   Occupational History    Not on file   Social Needs    Financial resource strain: Not on file    Food insecurity:     Worry: Not on file     Inability: Not on file    Transportation needs:     Medical: Not on file     Non-medical: Not on file   Tobacco Use    Smoking status: Former Smoker    Smokeless tobacco: Never Used   Substance and Sexual Activity    Alcohol use: Yes     Alcohol/week: 0.6 oz     Types: 1 Glasses of wine per week     Comment: maybe every 6 months    Drug use: No    Sexual activity: Yes   Lifestyle    Physical activity:     Days per week: Not on file     Minutes per session: Not on file    Stress: Not on file   Relationships    Social connections:     Talks on phone: Not on file     Gets together: Not on file     Attends [employer] service: Not on file     Active member of club or organization: Not on file     Attends meetings of clubs or organizations: Not on file     Relationship status: Not on file   Other Topics Concern    Not on file   Social History Narrative    Not on file     Past Surgical History:   Procedure Laterality Date    CATARACT EXTRACTION W/  INTRAOCULAR LENS IMPLANT Left 07/26/2016    Dr. Norman    CATARACT EXTRACTION W/  INTRAOCULAR LENS IMPLANT Right 08/09/2016    Dr. Norman    CHOLECYSTECTOMY      COLONOSCOPY N/A 3/7/2014    [facility]    CORONARY ANGIOPLASTY      CORONARY ARTERY BYPASS GRAFT      HERNIA REPAIR      INSERTION-INTRAOCULAR LENS (IOL) Right 8/9/2016    [facility]    INSERTION-INTRAOCULAR LENS (IOL) Left 7/26/2016    [facility]    PHACOEMULSIFICATION-ASPIRATION-CATARACT Right 8/9/2016    [facility]     "PHACOEMULSIFICATION-ASPIRATION-CATARACT Left 7/26/2016    [facility]    ptca      rca     Patient Active Problem List   Diagnosis    Hypertension    CAD (coronary artery disease)    ELINA (obstructive sleep apnea)    Hyperlipidemia    BPH (benign prostatic hyperplasia)    Mood disorder    S/P CABG (coronary artery bypass graft)    S/P PTCA (percutaneous transluminal coronary angioplasty)    Osteopenia    AAA (abdominal aortic aneurysm)    Nuclear sclerosis of both eyes    Vitreous detachment of right eye    Refractive error    Senile nuclear sclerosis    Nuclear sclerosis of right eye    Elevated PSA    Short-term memory loss    Decreased hearing of both ears    Other insomnia    Right hip pain    Situational depression     Review of Systems      Objective:      There were no vitals taken for this visit.  Estimated body mass index is 27.34 kg/m² as calculated from the following:    Height as of 8/22/19: 6' 1" (1.854 m).    Weight as of 8/22/19: 94 kg (207 lb 3.7 oz).  Physical Exam      Assessment and Plan:           Problem List Items Addressed This Visit     None          Follow up:     Today's visit was spent almost entirely on counseling. We reviewed his diagnosis, stage, grade, risk group, and prognosis. We discussed [place] (NCCN) and SILVANA risk stratification  We discussed the concept of low risk, intermediate risk, and high risk disease. We also reviewed the NCCN treatment nomogram.We discussed the different treatment options including active surveillance (as well as the surveillance protocol), prostate brachytherapy, IMRT, IGRT, cryotherapy, HIFU and both open and robotic prostatectomy.We also discussed the advantages, disadvantages, risks and benefits, as well as complications of each option. Regarding radiation therapy we discussed treatment planning, the different techniques, short and long term complications. These included radiation " cystitis, radiation proctitis, and impotence. We discussed success, failure, and salvage therapeutic options.Also discussed the use of SpaceOAR for brachytherapy and EBRT and fiducials for EBRT.   We discussed surgical therapy in depth including preoperative preparation, surgical technique (including bladder neck and nerve-sparing techniques), postoperative recuperation and recovery, and short and long term complications including UTI, bleeding, blood clots,catheter dislodgement, etc. We discussed the risks of reoperation, incontinence, impotence, and recurrence. We discussed preop and postop Kegels, post op penile rehab, and treatment options for incontinence and impotence. We discussed rates of cancer free survival and recurrence, as well as salvage therapeutic options. We discussed the possible  indications for adjuvant radiation therapy.   I answered questions and addressed concerns. Also discussed the Prolaris test to get genetic information about this tumors potential future behavior.   6 months with PSA, MRI.  Letter to Dr. Cardoso, his primary care physician.  I spent over 45 minutes with the patient. Over 50% of the visit was spent in counseling.   Pawel Salinas

## 2019-09-20 ENCOUNTER — PATIENT OUTREACH (OUTPATIENT)
Dept: OTHER | Facility: OTHER | Age: 74
End: 2019-09-20

## 2019-09-25 NOTE — PROGRESS NOTES
Digital Medicine: Clinician Follow-Up    Patient stated he is doing very well. Over the last four or five months, he lost 15 pounds. He went in for a regular check-up and had a high PSA level. He was diagnosed with prostate cancer. He is waiting and seeing at this time without treatment.    The history is provided by the patient.     Follow Up  Follow-up reason(s): reading review    Patient has remained at goal.     He denies hypotension symptoms.                  Medication Adherence:   He misses doses: never            PLAN  patient verbalizes understanding    Patient will remain on his current HTN medications at this time.          Topic    Lipid (Cholesterol) Test        Last 5 Patient Entered Readings                                      Current 30 Day Average: 116/72     Recent Readings 9/24/2019 9/20/2019 9/20/2019 9/13/2019 9/6/2019    SBP (mmHg) 122 111 103 116 113    DBP (mmHg) 71 75 69 70 74    Pulse 60 72 71 60 69             Hypertension Medications             metoprolol succinate (TOPROL-XL) 50 MG 24 hr tablet TAKE ONE TABLET BY MOUTH EVERY EVENING

## 2019-10-14 ENCOUNTER — LAB VISIT (OUTPATIENT)
Dept: LAB | Facility: HOSPITAL | Age: 74
End: 2019-10-14
Attending: INTERNAL MEDICINE
Payer: MEDICARE

## 2019-10-14 ENCOUNTER — IMMUNIZATION (OUTPATIENT)
Dept: PHARMACY | Facility: CLINIC | Age: 74
End: 2019-10-14
Payer: MEDICARE

## 2019-10-14 DIAGNOSIS — I25.10 CORONARY ARTERY DISEASE INVOLVING NATIVE CORONARY ARTERY OF NATIVE HEART WITHOUT ANGINA PECTORIS: ICD-10-CM

## 2019-10-14 DIAGNOSIS — I71.40 ABDOMINAL AORTIC ANEURYSM (AAA) WITHOUT RUPTURE: ICD-10-CM

## 2019-10-14 DIAGNOSIS — E78.5 HYPERLIPIDEMIA, UNSPECIFIED HYPERLIPIDEMIA TYPE: ICD-10-CM

## 2019-10-14 LAB
CHOLEST SERPL-MCNC: 131 MG/DL (ref 120–199)
CHOLEST/HDLC SERPL: 2.7 {RATIO} (ref 2–5)
HDLC SERPL-MCNC: 49 MG/DL (ref 40–75)
HDLC SERPL: 37.4 % (ref 20–50)
LDLC SERPL CALC-MCNC: 69.8 MG/DL (ref 63–159)
NONHDLC SERPL-MCNC: 82 MG/DL
TRIGL SERPL-MCNC: 61 MG/DL (ref 30–150)

## 2019-10-14 PROCEDURE — 80061 LIPID PANEL: CPT

## 2019-10-14 PROCEDURE — 36415 COLL VENOUS BLD VENIPUNCTURE: CPT

## 2019-10-15 ENCOUNTER — TELEPHONE (OUTPATIENT)
Dept: CARDIOLOGY | Facility: CLINIC | Age: 74
End: 2019-10-15

## 2019-10-15 RX ORDER — ATORVASTATIN CALCIUM 80 MG/1
80 TABLET, FILM COATED ORAL NIGHTLY
Qty: 90 TABLET | Refills: 3 | Status: SHIPPED | OUTPATIENT
Start: 2019-10-15 | End: 2020-04-20

## 2019-10-15 NOTE — TELEPHONE ENCOUNTER
Spoke with patient to advise him that Lipid Panel showed that lipids are on target.  Answered all questions.

## 2019-10-15 NOTE — TELEPHONE ENCOUNTER
----- Message from Sol Loja sent at 10/15/2019 10:47 AM CDT -----  Contact: pt  Type:  Patient Returning Call    Who Called: Peter  Who Left Message for Patient: Satya  Does the patient know what this is regarding?: Returning Call  Would the patient rather a call back or a response via [employer]? Callback  Best Call Back Number: [phone]  Additional Information: No

## 2019-10-24 ENCOUNTER — PATIENT MESSAGE (OUTPATIENT)
Dept: OTHER | Facility: OTHER | Age: 74
End: 2019-10-24

## 2019-11-04 ENCOUNTER — PATIENT MESSAGE (OUTPATIENT)
Dept: OTHER | Facility: OTHER | Age: 74
End: 2019-11-04

## 2019-11-04 ENCOUNTER — PATIENT OUTREACH (OUTPATIENT)
Dept: OTHER | Facility: OTHER | Age: 74
End: 2019-11-04

## 2019-12-02 ENCOUNTER — LAB VISIT (OUTPATIENT)
Dept: LAB | Facility: HOSPITAL | Age: 74
End: 2019-12-02
Attending: FAMILY MEDICINE
Payer: MEDICARE

## 2019-12-02 DIAGNOSIS — I10 ESSENTIAL HYPERTENSION: ICD-10-CM

## 2019-12-02 LAB
CHOLEST SERPL-MCNC: 139 MG/DL (ref 120–199)
CHOLEST/HDLC SERPL: 2.9 {RATIO} (ref 2–5)
HDLC SERPL-MCNC: 48 MG/DL (ref 40–75)
HDLC SERPL: 34.5 % (ref 20–50)
LDLC SERPL CALC-MCNC: 69.4 MG/DL (ref 63–159)
NONHDLC SERPL-MCNC: 91 MG/DL
TRIGL SERPL-MCNC: 108 MG/DL (ref 30–150)

## 2019-12-02 PROCEDURE — 36415 COLL VENOUS BLD VENIPUNCTURE: CPT | Mod: PO

## 2019-12-02 PROCEDURE — 80061 LIPID PANEL: CPT

## 2019-12-09 ENCOUNTER — HOSPITAL ENCOUNTER (OUTPATIENT)
Dept: RADIOLOGY | Facility: HOSPITAL | Age: 74
Discharge: HOME | End: 2019-12-09
Attending: FAMILY MEDICINE
Payer: MEDICARE

## 2019-12-09 ENCOUNTER — IMMUNIZATION (OUTPATIENT)
Dept: PHARMACY | Facility: CLINIC | Age: 74
End: 2019-12-09
Payer: MEDICARE

## 2019-12-09 DIAGNOSIS — I71.40 ABDOMINAL AORTIC ANEURYSM (AAA) WITHOUT RUPTURE: ICD-10-CM

## 2019-12-09 PROCEDURE — 76775 US EXAM ABDO BACK WALL LIM: CPT | Mod: 26,,, | Performed by: RADIOLOGY

## 2019-12-09 PROCEDURE — 76775 US ABDOMINAL AORTA: ICD-10-PCS | Mod: 26,,, | Performed by: RADIOLOGY

## 2019-12-09 PROCEDURE — 76775 US EXAM ABDO BACK WALL LIM: CPT | Mod: TC

## 2019-12-10 ENCOUNTER — PATIENT MESSAGE (OUTPATIENT)
Dept: INTERNAL MEDICINE | Facility: CLINIC | Age: 74
End: 2019-12-10

## 2019-12-10 DIAGNOSIS — I71.40 ABDOMINAL AORTIC ANEURYSM (AAA) WITHOUT RUPTURE: Primary | ICD-10-CM

## 2019-12-11 ENCOUNTER — PATIENT MESSAGE (OUTPATIENT)
Dept: INTERNAL MEDICINE | Facility: CLINIC | Age: 74
End: 2019-12-11

## 2019-12-11 NOTE — PROGRESS NOTES
Digital Medicine: Health  Follow-Up    Patient reports he has been doing and feeling really good.   Patient denies any other concerns at this time.           Follow Up  Follow-up reason(s): reading review      Patient reports he is pleased with his BP.       INTERVENTION(S)  encouragement/support and denied further coaching    PLAN  continue monitoring      There are no preventive care reminders to display for this patient.    Last 5 Patient Entered Readings                                      Current 30 Day Average: 117/70     Recent Readings 12/8/2019 12/8/2019 12/8/2019 12/2/2019 11/30/2019    SBP (mmHg) 125 134 140 127 113    DBP (mmHg) 73 69 79 77 68    Pulse 64 55 56 58 59            Diet Screening   No change to diet.      Physical Activity Screening   No change to exercise routine.

## 2020-01-21 DIAGNOSIS — I25.10 CAD (CORONARY ARTERY DISEASE): ICD-10-CM

## 2020-01-21 RX ORDER — METOPROLOL SUCCINATE 50 MG/1
50 TABLET, EXTENDED RELEASE ORAL NIGHTLY
Qty: 90 TABLET | Refills: 3 | Status: SHIPPED | OUTPATIENT
Start: 2020-01-21 | End: 2021-03-15

## 2020-01-22 RX ORDER — TAMSULOSIN HYDROCHLORIDE 0.4 MG/1
CAPSULE ORAL
Qty: 90 CAPSULE | Refills: 3 | Status: SHIPPED | OUTPATIENT
Start: 2020-01-22 | End: 2021-03-18 | Stop reason: SDUPTHER

## 2020-02-13 ENCOUNTER — TELEPHONE (OUTPATIENT)
Dept: OPHTHALMOLOGY | Facility: CLINIC | Age: 75
End: 2020-02-13

## 2020-02-13 NOTE — TELEPHONE ENCOUNTER
----- Message from Sameera Olson sent at 2/13/2020 12:47 PM CST -----  Contact: "[employer] request  Message     Appointment Request From: Peter Caldwell    With Provider: jacques    Preferred Date Range: Any    Preferred Times: Any time    Reason for visit: eye exam    Comments:  problem with left eye

## 2020-02-19 ENCOUNTER — PATIENT OUTREACH (OUTPATIENT)
Dept: OTHER | Facility: OTHER | Age: 75
End: 2020-02-19

## 2020-02-19 NOTE — PROGRESS NOTES
Digital Medicine: Health  Follow-Up    Patient denies any other concerns at this time.           Follow Up  Follow-up reason(s): reading review        INTERVENTION(S)  encouragement/support and denied further coaching    PLAN  continue monitoring      There are no preventive care reminders to display for this patient.    Last 5 Patient Entered Readings                                      Current 30 Day Average: 119/70     Recent Readings 2/15/2020 2/15/2020 2/10/2020 2/10/2020 2/10/2020    SBP (mmHg) 116 114 122 136 139    DBP (mmHg) 68 74 67 74 71    Pulse 63 64 74 60 59            Diet Screening   No change to diet.      Physical Activity Screening   No change to exercise routine.

## 2020-03-05 ENCOUNTER — PATIENT MESSAGE (OUTPATIENT)
Dept: ADMINISTRATIVE | Facility: OTHER | Age: 75
End: 2020-03-05

## 2020-03-06 ENCOUNTER — PATIENT OUTREACH (OUTPATIENT)
Dept: ADMINISTRATIVE | Facility: OTHER | Age: 75
End: 2020-03-06

## 2020-03-06 NOTE — PROGRESS NOTES
Care Everywhere: updated  Immunization: updated  Health Maintenance: updated  Media Review: reviewed for possible outside colon caner screening   Legacy Review: reviewed colonoscopy report from 2006 and 2010 , hx of polyps   Order placed: n/a  Upcoming appts:n/a

## 2020-03-09 ENCOUNTER — OFFICE VISIT (OUTPATIENT)
Dept: OPHTHALMOLOGY | Facility: CLINIC | Age: 75
End: 2020-03-09
Payer: MEDICARE

## 2020-03-09 DIAGNOSIS — I10 ESSENTIAL HYPERTENSION: ICD-10-CM

## 2020-03-09 DIAGNOSIS — H26.493 PCO (POSTERIOR CAPSULAR OPACIFICATION), BILATERAL: Primary | ICD-10-CM

## 2020-03-09 DIAGNOSIS — Z96.1 PSEUDOPHAKIA: ICD-10-CM

## 2020-03-09 DIAGNOSIS — H52.7 REFRACTIVE ERROR: ICD-10-CM

## 2020-03-09 DIAGNOSIS — H43.813 VITREOUS DETACHMENT OF BOTH EYES: ICD-10-CM

## 2020-03-09 PROCEDURE — 92004 COMPRE OPH EXAM NEW PT 1/>: CPT | Mod: S$PBB,,, | Performed by: OPHTHALMOLOGY

## 2020-03-09 PROCEDURE — 92004 PR EYE EXAM, NEW PATIENT,COMPREHESV: ICD-10-PCS | Mod: S$PBB,,, | Performed by: OPHTHALMOLOGY

## 2020-03-09 PROCEDURE — 99999 PR PBB SHADOW E&M-EST. PATIENT-LVL II: ICD-10-PCS | Mod: PBBFAC,,, | Performed by: OPHTHALMOLOGY

## 2020-03-09 PROCEDURE — 99999 PR PBB SHADOW E&M-EST. PATIENT-LVL II: CPT | Mod: PBBFAC,,, | Performed by: OPHTHALMOLOGY

## 2020-03-09 PROCEDURE — 99212 OFFICE O/P EST SF 10 MIN: CPT | Mod: PBBFAC,PO | Performed by: OPHTHALMOLOGY

## 2020-03-09 NOTE — PROGRESS NOTES
Subjective:       Patient ID: Peter Caldwell is a 74 y.o. male.    Chief Complaint: Blurred Vision    HPI     DLS: 9/7/2016 Dr. Norman     74 y.o. Male is here for blurred vision left eye within the last two   months. Denies eye pain and flashes. H/o floaters, bilateral. Slight   itching, burning and tearing. No noticeable problems with glare.     Eye Med's: Generic AT's prn OU     Last edited by NITA Toro on 3/9/2020  1:04 PM. (History)             Assessment:       1. PCO (posterior capsular opacification), bilateral    2. Vitreous detachment of both eyes    3. Essential hypertension    4. Refractive error    5. Pseudophakia        Plan:       Visually significant  PCO OS- Pt. Wants Laser.  Early PCO OD- Not Visually Significant.  PVD's OU-Stable.  HTN-No retinopathy OU.  RE      Control HTN.  RTC Friday at [facility] for YAG CAP OS.

## 2020-03-10 ENCOUNTER — HOSPITAL ENCOUNTER (OUTPATIENT)
Dept: RADIOLOGY | Facility: HOSPITAL | Age: 75
Discharge: HOME | End: 2020-03-10
Attending: UROLOGY
Payer: MEDICARE

## 2020-03-10 DIAGNOSIS — D49.59 NEOPLASM OF PROSTATE: ICD-10-CM

## 2020-03-10 PROCEDURE — A9585 GADOBUTROL INJECTION: HCPCS | Performed by: UROLOGY

## 2020-03-10 PROCEDURE — 72197 MRI PROSTATE W W/O CONTRAST: ICD-10-PCS | Mod: 26,,, | Performed by: RADIOLOGY

## 2020-03-10 PROCEDURE — 72197 MRI PELVIS W/O & W/DYE: CPT | Mod: 26,,, | Performed by: RADIOLOGY

## 2020-03-10 PROCEDURE — 72197 MRI PELVIS W/O & W/DYE: CPT | Mod: TC

## 2020-03-10 PROCEDURE — 25500020 PHARM REV CODE 255: Performed by: UROLOGY

## 2020-03-10 RX ORDER — GADOBUTROL 604.72 MG/ML
10 INJECTION INTRAVENOUS
Status: COMPLETED | OUTPATIENT
Start: 2020-03-10 | End: 2020-03-10

## 2020-03-10 RX ADMIN — GADOBUTROL 10 ML: 604.72 INJECTION INTRAVENOUS at 10:03

## 2020-03-11 LAB
CREAT SERPL-MCNC: 1 MG/DL (ref 0.5–1.4)
SAMPLE: NORMAL

## 2020-03-12 ENCOUNTER — OFFICE VISIT (OUTPATIENT)
Dept: UROLOGY | Facility: CLINIC | Age: 75
End: 2020-03-12
Payer: MEDICARE

## 2020-03-12 ENCOUNTER — PATIENT OUTREACH (OUTPATIENT)
Dept: ADMINISTRATIVE | Facility: OTHER | Age: 75
End: 2020-03-12

## 2020-03-12 ENCOUNTER — PATIENT OUTREACH (OUTPATIENT)
Dept: OTHER | Facility: OTHER | Age: 75
End: 2020-03-12

## 2020-03-12 VITALS
HEART RATE: 60 BPM | WEIGHT: 211.63 LBS | HEIGHT: 73 IN | BODY MASS INDEX: 28.05 KG/M2 | DIASTOLIC BLOOD PRESSURE: 66 MMHG | SYSTOLIC BLOOD PRESSURE: 116 MMHG

## 2020-03-12 DIAGNOSIS — C61 PROSTATE CANCER: Primary | ICD-10-CM

## 2020-03-12 PROCEDURE — 99214 OFFICE O/P EST MOD 30 MIN: CPT | Mod: S$PBB,,, | Performed by: UROLOGY

## 2020-03-12 PROCEDURE — 99999 PR PBB SHADOW E&M-EST. PATIENT-LVL III: CPT | Mod: PBBFAC,,, | Performed by: UROLOGY

## 2020-03-12 PROCEDURE — 99999 PR PBB SHADOW E&M-EST. PATIENT-LVL III: ICD-10-PCS | Mod: PBBFAC,,, | Performed by: UROLOGY

## 2020-03-12 PROCEDURE — 99214 PR OFFICE/OUTPT VISIT, EST, LEVL IV, 30-39 MIN: ICD-10-PCS | Mod: S$PBB,,, | Performed by: UROLOGY

## 2020-03-12 PROCEDURE — 99213 OFFICE O/P EST LOW 20 MIN: CPT | Mod: PBBFAC | Performed by: UROLOGY

## 2020-03-12 NOTE — PROGRESS NOTES
Clinic Note  3/12/2020      Subjective:         Chief Complaint:   HPI  Peter Caldwell is a 74 y.o. male with a prostate cancer on AS. No prior TRUS or biopsy. 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 Flomax for LUTS which is helping his urination. Complains of frequency and urgency. High fluid and caffeine intake with sodas. Denies dysuria, nocturia x1, no hematuria, incontinence. Strong stream. Empties his bladder well. PSA today 7.8.     Stage- T1C  PSA 7.8  PSAD- .057  Biopsy- (8/22/2019) Mary 6 left apex 1/2, 7.7%.  Volume 138 ccs  MRI(3/10/2020)- Volume 160 ccs. 3 lesions 1) LMATZ 1.6 cm PI-RADS 3;  2) LAATZ 1.6 cm PI-RADS 3;  3)RAPZ 1.2 cm PI-RADS 4. Negative extra prostatic extension, seminal vesicle, bladder, nodes.   SILVANA score- 2, low risk   NCCN- very low         Lab Results   Component Value Date    PSA 4.3 (H) 07/13/2016    PSA 3.9 03/18/2015    PSA 4.35 (H) 03/12/2013    PSA 4.15 (H) 12/12/2012    PSA 2.93 09/10/2012    PSA 4.18 (H) 06/07/2012    PSA 3.1 05/17/2011    PSA 2.6 12/15/2009    PSA 2.0 02/04/2005    PSADIAG 7.8 (H) 03/10/2020    PSADIAG 8.0 (H) 07/30/2019    PSADIAG 9.9 (H) 07/15/2019    PSADIAG 6.5 (H) 01/11/2019    PSADIAG 5.0 (H) 04/18/2017    PSADIAG 5.6 (H) 10/28/2016    PSADIAG 3.2 01/21/2014      Past Medical History:   Diagnosis Date    AAA (abdominal aortic aneurysm)     Arthritis     BPH (benign prostatic hyperplasia)     CAD (coronary artery disease)     khuri    Cataract     Colon polyps 2006    Depression     Elevated PSA     dr robles    Ex-smoker     Hyperlipidemia     Hypertension     Mood disorder     chalasani    ELINA (obstructive sleep apnea)     Osteopenia 4/15 aylin 4/20    Other insomnia 1/7/2019    S/P CABG (coronary artery bypass graft) 8/9/2013    S/P PTCA (percutaneous transluminal coronary angioplasty) 8/9/2013     Family History   Problem Relation Age of Onset    Coronary artery disease Mother     Diabetes  Mother     Heart disease Mother     Coronary artery disease Father     Heart disease Father     Heart disease Brother     Heart disease Brother     Amblyopia Neg Hx     Blindness Neg Hx     Cataracts Neg Hx     Glaucoma Neg Hx     Macular degeneration Neg Hx     Retinal detachment Neg Hx     Strabismus Neg Hx      Social History     Socioeconomic History    Marital status:      Spouse name: Not on file    Number of children: 2    Years of education: Not on file    Highest education level: Not on file   Occupational History    Not on file   Social Needs    Financial resource strain: Not hard at all    Food insecurity:     Worry: Never true     Inability: Never true    Transportation needs:     Medical: No     Non-medical: No   Tobacco Use    Smoking status: Former Smoker    Smokeless tobacco: Never Used   Substance and Sexual Activity    Alcohol use: Yes     Alcohol/week: 1.0 standard drinks     Types: 1 Glasses of wine per week     Frequency: Never     Drinks per session: Patient refused     Binge frequency: Never     Comment: maybe every 6 months    Drug use: No    Sexual activity: Yes   Lifestyle    Physical activity:     Days per week: 0 days     Minutes per session: Not on file    Stress: Rather much   Relationships    Social connections:     Talks on phone: Twice a week     Gets together: Twice a week     Attends Protestant service: Not on file     Active member of club or organization: Yes     Attends meetings of clubs or organizations: More than 4 times per year     Relationship status:    Other Topics Concern    Not on file   Social History Narrative    Not on file     Past Surgical History:   Procedure Laterality Date    CATARACT EXTRACTION W/  INTRAOCULAR LENS IMPLANT Left 07/26/2016    Dr. Norman    CATARACT EXTRACTION W/  INTRAOCULAR LENS IMPLANT Right 08/09/2016    Dr. Norman    CHOLECYSTECTOMY      CORONARY ANGIOPLASTY      CORONARY ARTERY BYPASS  "GRAFT      HERNIA REPAIR      ptca      rca     Patient Active Problem List   Diagnosis    Hypertension    CAD (coronary artery disease)    ELINA (obstructive sleep apnea)    Hyperlipidemia    BPH (benign prostatic hyperplasia)    Mood disorder    S/P CABG (coronary artery bypass graft)    S/P PTCA (percutaneous transluminal coronary angioplasty)    Osteopenia    AAA (abdominal aortic aneurysm)    Nuclear sclerosis of both eyes    Vitreous detachment of both eyes    Refractive error    Senile nuclear sclerosis    Nuclear sclerosis of right eye    Short-term memory loss    Decreased hearing of both ears    Other insomnia    Right hip pain    Situational depression    PCO (posterior capsular opacification), bilateral    Pseudophakia     Review of Systems   Constitutional: Negative for activity change, appetite change, diaphoresis, fever and unexpected weight change.   HENT: Negative.    Eyes: Negative.    Respiratory: Negative for cough, shortness of breath and wheezing.    Cardiovascular: Negative for chest pain, palpitations and leg swelling.   Gastrointestinal: Negative for abdominal pain, blood in stool, constipation, diarrhea, nausea and vomiting.   Genitourinary: Positive for frequency and urgency. Negative for dysuria, hematuria and nocturia.   Musculoskeletal: Negative for back pain.   Skin: Negative for color change, rash and wound.   Neurological: Negative for dizziness, weakness, numbness and headaches.   Hematological: Does not bruise/bleed easily.   Psychiatric/Behavioral: Negative.          Objective:      There were no vitals taken for this visit.  Estimated body mass index is 26.88 kg/m² as calculated from the following:    Height as of 9/10/19: 6' 1" (1.854 m).    Weight as of 9/10/19: 92.4 kg (203 lb 11.3 oz).  Physical Exam   Constitutional: He is oriented to person, place, and time. He appears well-developed and well-nourished. No distress.   Cardiovascular: Normal rate and " intact distal pulses.    Pulmonary/Chest: Effort normal. No respiratory distress.   Abdominal: Soft. He exhibits no distension. There is no tenderness.   Genitourinary:   Genitourinary Comments: Circumsized penis. Orthotopic meatus, no stenosis. No plaques, ulcers, or lesions along penis. Testes palpated bilaterally, no masses. Normal cord structures. Scrotum normal without lesions. Prostate >45 grams, smooth without nodularity.   Musculoskeletal: Normal range of motion. He exhibits no tenderness.   Neurological: He is alert and oriented to person, place, and time.   Skin: Skin is warm and dry. No rash noted.     Psychiatric: He has a normal mood and affect. Judgment normal.         Assessment and Plan:           Problem List Items Addressed This Visit     None          Follow up:   6 months with PSA.    Pawel Salinas

## 2020-03-12 NOTE — PROGRESS NOTES
Digital Medicine: Clinician Follow-Up    Called patient to discuss his at goal readings.    The history is provided by the patient.     Follow Up  Follow-up reason(s): reading review    Patient's blood pressure is well controlled.    Patient is having depressive symptoms for the past year. He is attributing this to three respiratory illnesses throughout this year however he asked if I could reach out to Dr. Cardoso on his behalf.      INTERVENTION(S)  reviewed appropriate dose schedule and encouragement/support    PLAN  patient verbalizes understanding and continue monitoring    Patient will maintain his current hypertension medication regimen.    Patient is aware to contact us with any changes or problems especially hypotension symptoms prior to my next outreach.    I messaged Dr. Cardoso with his depressive symptoms to see if further work-up is needed.      There are no preventive care reminders to display for this patient.    Last 5 Patient Entered Readings                                      Current 30 Day Average: 119/73     Recent Readings 3/7/2020 2/28/2020 2/21/2020 2/15/2020 2/15/2020    SBP (mmHg) 129 116 115 116 114    DBP (mmHg) 72 72 77 68 74    Pulse 63 62 69 63 64             Hypertension Medications             metoprolol succinate (TOPROL-XL) 50 MG 24 hr tablet Take 1 tablet (50 mg total) by mouth every evening.                             Medication Adherence Screening   He did not miss a dose this month.

## 2020-03-24 ENCOUNTER — TELEPHONE (OUTPATIENT)
Dept: URGENT CARE | Facility: CLINIC | Age: 75
End: 2020-03-24

## 2020-03-24 NOTE — TELEPHONE ENCOUNTER
----- Message from Rony Cardoso MD sent at 3/13/2020  4:33 PM CDT -----  Please ask mr leong if he can do a video visit with me to discuss if he desires  ----- Message -----  From: Wilda Ramires, Rayna  Sent: 3/12/2020   1:19 PM CDT  To: Rony Cardoso MD    Let me know if I can help in anyway.     Wilda Ramires, PharmD.  [phone]

## 2020-03-24 NOTE — TELEPHONE ENCOUNTER
I returned a call to the pt and there was no answer so I left another message to contact staff. //kah

## 2020-03-24 NOTE — TELEPHONE ENCOUNTER
I s/w the pt and he would like to pass at this time due to the digital medicine clinic keep up with it and contacting him regularly and states he is within range. He just would like to keep all medication refilled when necessary. I verbalized understanding. He states there are none needed at this time. fyi  //kah

## 2020-03-24 NOTE — TELEPHONE ENCOUNTER
----- Message from Nida Fernandez sent at 3/24/2020 10:59 AM CDT -----  Contact: pt  .Type:  Patient Returning Call    Who Called: pt  Who Left Message for Patient:   Does the patient know what this is regarding?:   Would the patient rather a call back or a response via [employer]?  Call back   Best Call Back Number: [phone] (home)   Additional Information:

## 2020-03-24 NOTE — TELEPHONE ENCOUNTER
I called and left the pt and left a vm asking that he give us a contact call informing if he is interested in a video visit. //kah

## 2020-03-24 NOTE — TELEPHONE ENCOUNTER
----- Message from Lor Morrell sent at 3/24/2020 11:15 AM CDT -----  Contact: pt  Type:  Patient Returning Call    Who Called:Patient  Who Left Message for Patient:Dora  Does the patient know what this is regarding?:na  Would the patient rather a call back or a response via [employer]? Call back  Best Call Back Number:[phone]  Additional Information: [phone]

## 2020-04-17 ENCOUNTER — PATIENT MESSAGE (OUTPATIENT)
Dept: UROLOGY | Facility: CLINIC | Age: 75
End: 2020-04-17

## 2020-04-20 ENCOUNTER — PATIENT MESSAGE (OUTPATIENT)
Dept: OTHER | Facility: OTHER | Age: 75
End: 2020-04-20

## 2020-04-20 RX ORDER — ATORVASTATIN CALCIUM 80 MG/1
TABLET, FILM COATED ORAL
Qty: 90 TABLET | Refills: 3 | Status: SHIPPED | OUTPATIENT
Start: 2020-04-20 | End: 2021-03-26 | Stop reason: SDUPTHER

## 2020-05-18 ENCOUNTER — PATIENT OUTREACH (OUTPATIENT)
Dept: OTHER | Facility: OTHER | Age: 75
End: 2020-05-18

## 2020-06-01 ENCOUNTER — PATIENT OUTREACH (OUTPATIENT)
Dept: ADMINISTRATIVE | Facility: OTHER | Age: 75
End: 2020-06-01

## 2020-06-04 ENCOUNTER — OFFICE VISIT (OUTPATIENT)
Dept: OPHTHALMOLOGY | Facility: CLINIC | Age: 75
End: 2020-06-04
Payer: MEDICARE

## 2020-06-04 VITALS — SYSTOLIC BLOOD PRESSURE: 126 MMHG | DIASTOLIC BLOOD PRESSURE: 70 MMHG

## 2020-06-04 DIAGNOSIS — H43.813 VITREOUS DETACHMENT OF BOTH EYES: ICD-10-CM

## 2020-06-04 DIAGNOSIS — H26.493 PCO (POSTERIOR CAPSULAR OPACIFICATION), BILATERAL: Primary | ICD-10-CM

## 2020-06-04 PROCEDURE — 99999 PR PBB SHADOW E&M-EST. PATIENT-LVL II: CPT | Mod: PBBFAC,,, | Performed by: OPHTHALMOLOGY

## 2020-06-04 PROCEDURE — 99212 OFFICE O/P EST SF 10 MIN: CPT | Mod: PBBFAC,PO | Performed by: OPHTHALMOLOGY

## 2020-06-04 PROCEDURE — 99499 NO LOS: ICD-10-PCS | Mod: S$PBB,,, | Performed by: OPHTHALMOLOGY

## 2020-06-04 PROCEDURE — 99999 PR PBB SHADOW E&M-EST. PATIENT-LVL II: ICD-10-PCS | Mod: PBBFAC,,, | Performed by: OPHTHALMOLOGY

## 2020-06-04 PROCEDURE — 99499 UNLISTED E&M SERVICE: CPT | Mod: S$PBB,,, | Performed by: OPHTHALMOLOGY

## 2020-06-04 PROCEDURE — 66821 AFTER CATARACT LASER SURGERY: CPT | Mod: PBBFAC,PO | Performed by: OPHTHALMOLOGY

## 2020-06-04 PROCEDURE — 66821 PR DISCISSION,2ND CATARACT,LASER: ICD-10-PCS | Mod: S$PBB,LT,, | Performed by: OPHTHALMOLOGY

## 2020-06-04 PROCEDURE — 66821 AFTER CATARACT LASER SURGERY: CPT | Mod: S$PBB,LT,, | Performed by: OPHTHALMOLOGY

## 2020-06-04 NOTE — PROGRESS NOTES
Subjective:       Patient ID: Peter Caldwell is a 74 y.o. male.    Chief Complaint: Laser Treatment    HPI     DSL- 3/9/2020     75 y/o male is here for Yag Laser of the LT eye. Pt c/o of blurry Va OS.     Eyemeds  No gtts    Last edited by Zuhair Gunter on 6/4/2020  3:56 PM. (History)             Assessment:       1. PCO (posterior capsular opacification), bilateral    2. Vitreous detachment of both eyes        Plan:       Visually significant  PCO OS- Pt. Wants Laser.  Early PCO OD- Not Visually Significant.  PVD OS-Stable.        YAG CAP OS today. TE=   40.0 mj, Avg. 0.9-1.0 mj, 41 pulses.     PF taper OS.  RTC 1 wk.

## 2020-06-12 ENCOUNTER — OFFICE VISIT (OUTPATIENT)
Dept: OPHTHALMOLOGY | Facility: CLINIC | Age: 75
End: 2020-06-12
Payer: MEDICARE

## 2020-06-12 DIAGNOSIS — Z96.1 PSEUDOPHAKIA: ICD-10-CM

## 2020-06-12 DIAGNOSIS — H52.7 REFRACTIVE ERROR: ICD-10-CM

## 2020-06-12 DIAGNOSIS — H26.491 PCO (POSTERIOR CAPSULAR OPACIFICATION), RIGHT: ICD-10-CM

## 2020-06-12 DIAGNOSIS — Z98.890 POST-OPERATIVE STATE: Primary | ICD-10-CM

## 2020-06-12 PROCEDURE — 99999 PR PBB SHADOW E&M-EST. PATIENT-LVL II: CPT | Mod: PBBFAC,,, | Performed by: OPHTHALMOLOGY

## 2020-06-12 PROCEDURE — 99024 POSTOP FOLLOW-UP VISIT: CPT | Mod: POP,,, | Performed by: OPHTHALMOLOGY

## 2020-06-12 PROCEDURE — 99024 PR POST-OP FOLLOW-UP VISIT: ICD-10-PCS | Mod: POP,,, | Performed by: OPHTHALMOLOGY

## 2020-06-12 PROCEDURE — 99212 OFFICE O/P EST SF 10 MIN: CPT | Mod: PBBFAC,PO | Performed by: OPHTHALMOLOGY

## 2020-06-12 PROCEDURE — 99999 PR PBB SHADOW E&M-EST. PATIENT-LVL II: ICD-10-PCS | Mod: PBBFAC,,, | Performed by: OPHTHALMOLOGY

## 2020-06-12 NOTE — PROGRESS NOTES
Subjective:       Patient ID: Peter Caldwell is a 74 y.o. male.    Chief Complaint: Post-op Evaluation (1 week Yag po)    HPI     Post-op Evaluation      Additional comments: 1 week Yag po              Comments     DSL- 6/4/2020     73 y/o male is here for 1 week post op Yag Laser of the LT eye. Pt states   Va OS improved after laser. Pt denies pain discomfort.     Eyemeds  No gtts          Last edited by Zuhair Gunter on 6/12/2020 10:32 AM. (History)             Assessment:       1. Post-operative state    2. PCO (posterior capsular opacification), right    3. Refractive error    4. Pseudophakia        Plan:       S/p YAG CAP OS- Doing well.     Early PCO OD- Not Visually Significant.  RE-Pt does not want MRx.      RTC 6 mos.

## 2020-06-29 PROCEDURE — 99454 REM MNTR PHYSIOL PARAM 16-30: CPT | Mod: PBBFAC | Performed by: FAMILY MEDICINE

## 2020-07-17 DIAGNOSIS — E78.5 HYPERLIPIDEMIA, UNSPECIFIED HYPERLIPIDEMIA TYPE: ICD-10-CM

## 2020-07-17 DIAGNOSIS — Z71.89 COMPLEX CARE COORDINATION: ICD-10-CM

## 2020-07-17 DIAGNOSIS — I10 ESSENTIAL HYPERTENSION: Primary | ICD-10-CM

## 2020-07-21 ENCOUNTER — LAB VISIT (OUTPATIENT)
Dept: LAB | Facility: HOSPITAL | Age: 75
End: 2020-07-21
Attending: INTERNAL MEDICINE
Payer: MEDICARE

## 2020-07-21 DIAGNOSIS — E78.5 HYPERLIPIDEMIA, UNSPECIFIED HYPERLIPIDEMIA TYPE: ICD-10-CM

## 2020-07-21 DIAGNOSIS — I10 ESSENTIAL HYPERTENSION: ICD-10-CM

## 2020-07-21 LAB
ALBUMIN SERPL BCP-MCNC: 3.9 G/DL (ref 3.5–5.2)
ALP SERPL-CCNC: 52 U/L (ref 55–135)
ALT SERPL W/O P-5'-P-CCNC: 16 U/L (ref 10–44)
ANION GAP SERPL CALC-SCNC: 6 MMOL/L (ref 8–16)
AST SERPL-CCNC: 19 U/L (ref 10–40)
BILIRUB SERPL-MCNC: 0.9 MG/DL (ref 0.1–1)
BUN SERPL-MCNC: 10 MG/DL (ref 8–23)
CALCIUM SERPL-MCNC: 9.3 MG/DL (ref 8.7–10.5)
CHLORIDE SERPL-SCNC: 108 MMOL/L (ref 95–110)
CHOLEST SERPL-MCNC: 137 MG/DL (ref 120–199)
CHOLEST/HDLC SERPL: 3.3 {RATIO} (ref 2–5)
CO2 SERPL-SCNC: 27 MMOL/L (ref 23–29)
CREAT SERPL-MCNC: 1.1 MG/DL (ref 0.5–1.4)
EST. GFR  (AFRICAN AMERICAN): >60 ML/MIN/1.73 M^2
EST. GFR  (NON AFRICAN AMERICAN): >60 ML/MIN/1.73 M^2
GLUCOSE SERPL-MCNC: 95 MG/DL (ref 70–110)
HDLC SERPL-MCNC: 42 MG/DL (ref 40–75)
HDLC SERPL: 30.7 % (ref 20–50)
LDLC SERPL CALC-MCNC: 76.6 MG/DL (ref 63–159)
NONHDLC SERPL-MCNC: 95 MG/DL
POTASSIUM SERPL-SCNC: 4.8 MMOL/L (ref 3.5–5.1)
PROT SERPL-MCNC: 6.7 G/DL (ref 6–8.4)
SODIUM SERPL-SCNC: 141 MMOL/L (ref 136–145)
TRIGL SERPL-MCNC: 92 MG/DL (ref 30–150)

## 2020-07-21 PROCEDURE — 80053 COMPREHEN METABOLIC PANEL: CPT

## 2020-07-21 PROCEDURE — 36415 COLL VENOUS BLD VENIPUNCTURE: CPT | Mod: PO

## 2020-07-21 PROCEDURE — 80061 LIPID PANEL: CPT

## 2020-07-27 ENCOUNTER — PATIENT OUTREACH (OUTPATIENT)
Dept: ADMINISTRATIVE | Facility: OTHER | Age: 75
End: 2020-07-27

## 2020-07-27 NOTE — PROGRESS NOTES
Chart reviewed.   Immunizations: Triggered Imm Registry     Orders placed: n/a  Upcoming appts to satisfy LILLI topics: n/a

## 2020-08-05 NOTE — PROGRESS NOTES
"Digital Medicine: Health  Follow-Up    Called to introduce myself as new health . Discussed spike in BP for 2 days, patient unable to identify cause of elevation. Reports he felt "off" that day, but feeling much better now.     Patient reports he is well, no complaints. Denies symptoms of hypertension- HA, chest pains and SOB.     Discussed COVID-19 and importance of proper hand hygiene and social distancing.       The history is provided by the patient.             Reason for review: Blood pressure at goal            Diet-Not assessed          Physical Activity-Change      Additional physical activity details: Reports he and his wife have been unable to travel during pandemic. Reports he is "semi-retired".       Medication Adherence-Medication adherence was asssessed.  Patient continue taking medication as prescribed.        Substance, Sleep, Stress-Not assessed      Continue current diet/physical activity routine.  Instructed to charge device.  Provided patient education.       Addressed any questions or concerns and patient has my contact information if needed prior to next outreach. Patient verbalizes understanding.      Explained the importance of self-monitoring and medication adherence. Encouraged the patient to communicate with their health  for lifestyle modifications to help improve or maintain a healthy lifestyle.            There are no preventive care reminders to display for this patient.    Last 5 Patient Entered Readings                                      Current 30 Day Average: 121/71     Recent Readings 8/5/2020 8/5/2020 8/2/2020 7/29/2020 7/24/2020    SBP (mmHg) 120 107 122 108 135    DBP (mmHg) 66 70 71 68 79    Pulse 59 61 57 57 55               "

## 2020-08-13 ENCOUNTER — PATIENT OUTREACH (OUTPATIENT)
Dept: ADMINISTRATIVE | Facility: OTHER | Age: 75
End: 2020-08-13

## 2020-08-14 ENCOUNTER — OFFICE VISIT (OUTPATIENT)
Dept: CARDIOLOGY | Facility: CLINIC | Age: 75
End: 2020-08-14
Payer: MEDICARE

## 2020-08-14 VITALS
SYSTOLIC BLOOD PRESSURE: 120 MMHG | DIASTOLIC BLOOD PRESSURE: 72 MMHG | HEART RATE: 57 BPM | WEIGHT: 207.44 LBS | HEIGHT: 73 IN | BODY MASS INDEX: 27.49 KG/M2 | OXYGEN SATURATION: 97 %

## 2020-08-14 DIAGNOSIS — Z95.1 S/P CABG (CORONARY ARTERY BYPASS GRAFT): ICD-10-CM

## 2020-08-14 DIAGNOSIS — G47.33 OSA (OBSTRUCTIVE SLEEP APNEA): ICD-10-CM

## 2020-08-14 DIAGNOSIS — Z98.61 S/P PTCA (PERCUTANEOUS TRANSLUMINAL CORONARY ANGIOPLASTY): ICD-10-CM

## 2020-08-14 DIAGNOSIS — E78.5 HYPERLIPIDEMIA, UNSPECIFIED HYPERLIPIDEMIA TYPE: ICD-10-CM

## 2020-08-14 DIAGNOSIS — I10 ESSENTIAL HYPERTENSION: ICD-10-CM

## 2020-08-14 DIAGNOSIS — I71.40 ABDOMINAL AORTIC ANEURYSM (AAA) WITHOUT RUPTURE: ICD-10-CM

## 2020-08-14 DIAGNOSIS — I25.10 CORONARY ARTERY DISEASE INVOLVING NATIVE CORONARY ARTERY OF NATIVE HEART WITHOUT ANGINA PECTORIS: Primary | ICD-10-CM

## 2020-08-14 PROCEDURE — 99214 PR OFFICE/OUTPT VISIT, EST, LEVL IV, 30-39 MIN: ICD-10-PCS | Mod: S$PBB,,, | Performed by: INTERNAL MEDICINE

## 2020-08-14 PROCEDURE — 99999 PR PBB SHADOW E&M-EST. PATIENT-LVL III: ICD-10-PCS | Mod: PBBFAC,,, | Performed by: INTERNAL MEDICINE

## 2020-08-14 PROCEDURE — 99214 OFFICE O/P EST MOD 30 MIN: CPT | Mod: S$PBB,,, | Performed by: INTERNAL MEDICINE

## 2020-08-14 PROCEDURE — 99213 OFFICE O/P EST LOW 20 MIN: CPT | Mod: PBBFAC | Performed by: INTERNAL MEDICINE

## 2020-08-14 PROCEDURE — 99999 PR PBB SHADOW E&M-EST. PATIENT-LVL III: CPT | Mod: PBBFAC,,, | Performed by: INTERNAL MEDICINE

## 2020-08-14 NOTE — PROGRESS NOTES
Subjective:   Patient ID:  Peter Caldwell is a 74 y.o. male who presents for follow up of Coronary Artery Disease      HPI  A 73 yo male with cad s/p cabg htn hlp is here for f/u cad. He has no new complaints cardiac wise compliant with diet works in the yard some exercise asymptomatic cardiovascular wise. He has aaa stable by u/s eval in December 2019 has no abdominal back or claudication or foot discoloration ha recurrent hernia some pain.   Past Medical History:   Diagnosis Date    AAA (abdominal aortic aneurysm)     Arthritis     BPH (benign prostatic hyperplasia)     CAD (coronary artery disease)     khuri    Cataract     Colon polyps 2006    Depression     Elevated PSA     dr robles    Ex-smoker     Hyperlipidemia     Hypertension     Mood disorder     chalasani    ELINA (obstructive sleep apnea)     Osteopenia 4/15 aylin 4/20    Other insomnia 1/7/2019    Prostate cancer 3/12/2020    S/P CABG (coronary artery bypass graft) 8/9/2013    S/P PTCA (percutaneous transluminal coronary angioplasty) 8/9/2013       Past Surgical History:   Procedure Laterality Date    CATARACT EXTRACTION W/  INTRAOCULAR LENS IMPLANT Left 07/26/2016    Dr. Norman    CATARACT EXTRACTION W/  INTRAOCULAR LENS IMPLANT Right 08/09/2016    Dr. Norman    CHOLECYSTECTOMY      CORONARY ANGIOPLASTY      CORONARY ARTERY BYPASS GRAFT      HERNIA REPAIR      ptca      rca       Social History     Tobacco Use    Smoking status: Former Smoker    Smokeless tobacco: Never Used   Substance Use Topics    Alcohol use: Yes     Alcohol/week: 1.0 standard drinks     Types: 1 Glasses of wine per week     Frequency: Never     Drinks per session: Patient refused     Binge frequency: Never     Comment: maybe every 6 months    Drug use: No       Family History   Problem Relation Age of Onset    Coronary artery disease Mother     Diabetes Mother     Heart disease Mother     Coronary artery disease Father     Heart disease  Father     Heart disease Brother     Heart disease Brother     Amblyopia Neg Hx     Blindness Neg Hx     Cataracts Neg Hx     Glaucoma Neg Hx     Macular degeneration Neg Hx     Retinal detachment Neg Hx     Strabismus Neg Hx        Current Outpatient Medications   Medication Sig    aspirin (ECOTRIN) 81 MG EC tablet Take 81 mg by mouth every evening.     atorvastatin (LIPITOR) 80 MG tablet TAKE 1 TABLET(80 MG) BY MOUTH EVERY NIGHT    fluticasone propionate (FLONASE) 50 mcg/actuation nasal spray SHAKE LIQUID AND USE 1 SPRAY IN EACH NOSTRIL EVERY DAY    ibuprofen (ADVIL,MOTRIN) 200 MG tablet Take 200 mg by mouth as needed for Pain.     metoprolol succinate (TOPROL-XL) 50 MG 24 hr tablet Take 1 tablet (50 mg total) by mouth every evening.    tamsulosin (FLOMAX) 0.4 mg Cap TAKE 1 CAPSULE BY MOUTH EVERY DAY     No current facility-administered medications for this visit.      Current Outpatient Medications on File Prior to Visit   Medication Sig    aspirin (ECOTRIN) 81 MG EC tablet Take 81 mg by mouth every evening.     atorvastatin (LIPITOR) 80 MG tablet TAKE 1 TABLET(80 MG) BY MOUTH EVERY NIGHT    fluticasone propionate (FLONASE) 50 mcg/actuation nasal spray SHAKE LIQUID AND USE 1 SPRAY IN EACH NOSTRIL EVERY DAY    ibuprofen (ADVIL,MOTRIN) 200 MG tablet Take 200 mg by mouth as needed for Pain.     metoprolol succinate (TOPROL-XL) 50 MG 24 hr tablet Take 1 tablet (50 mg total) by mouth every evening.    tamsulosin (FLOMAX) 0.4 mg Cap TAKE 1 CAPSULE BY MOUTH EVERY DAY     No current facility-administered medications on file prior to visit.      Review of patient's allergies indicates:   Allergen Reactions    Penicillins      Other reaction(s): Rash    Promethazine Other (See Comments)     paranoia    Percodan [oxycodone hcl-oxycodone-asa]      Sleep / nightmare problems         Review of Systems   Constitution: Negative for malaise/fatigue.   HENT: Positive for congestion.    Eyes: Negative for  blurred vision.   Cardiovascular: Negative for chest pain, claudication, cyanosis, dyspnea on exertion, irregular heartbeat, leg swelling, near-syncope, orthopnea, palpitations and paroxysmal nocturnal dyspnea.   Respiratory: Negative for cough, hemoptysis and shortness of breath.    Hematologic/Lymphatic: Negative for bleeding problem. Does not bruise/bleed easily.   Skin: Negative for dry skin and itching.   Musculoskeletal: Negative for falls, muscle weakness and myalgias.   Gastrointestinal: Negative for abdominal pain, diarrhea, heartburn, hematemesis, hematochezia and melena.   Genitourinary: Negative for flank pain and hematuria.   Neurological: Negative for dizziness, focal weakness, headaches, light-headedness, numbness, paresthesias, seizures and weakness.   Psychiatric/Behavioral: Negative for altered mental status and memory loss. The patient has insomnia. The patient is not nervous/anxious.         Has interrupted sleep due to bathroom trips.    Allergic/Immunologic: Negative for hives.       Objective:   Physical Exam   Constitutional: He is oriented to person, place, and time. He appears well-developed and well-nourished. No distress.   HENT:   Head: Normocephalic and atraumatic.   Eyes: Pupils are equal, round, and reactive to light. EOM are normal. Right eye exhibits no discharge. Left eye exhibits no discharge.   Neck: Neck supple. No JVD present. No thyromegaly present.   Cardiovascular: Normal rate, regular rhythm, normal heart sounds and intact distal pulses. Exam reveals no gallop and no friction rub.   No murmur heard.  Pulmonary/Chest: Effort normal and breath sounds normal. No respiratory distress. He has no wheezes. He has no rales. He exhibits no tenderness.   Scar cabg well healed.    Abdominal: Soft. Bowel sounds are normal. He exhibits no distension. There is no abdominal tenderness.   Musculoskeletal: Normal range of motion.         General: No edema.   Neurological: He is alert and  "oriented to person, place, and time. No cranial nerve deficit.   Skin: Skin is warm and dry. No rash noted. He is not diaphoretic. No erythema.   Psychiatric: He has a normal mood and affect. His behavior is normal.   Nursing note and vitals reviewed.    Vitals:    08/14/20 1617 08/14/20 1621   BP: 118/70 120/72   BP Location: Right arm Left arm   Patient Position: Sitting Sitting   BP Method: Large (Manual) Large (Manual)   Pulse: (!) 57    SpO2: 97%    Weight: 94.1 kg (207 lb 7.3 oz)    Height: 6' 1" (1.854 m)      Lab Results   Component Value Date    CHOL 137 07/21/2020    CHOL 139 12/02/2019    CHOL 131 10/14/2019     Lab Results   Component Value Date    HDL 42 07/21/2020    HDL 48 12/02/2019    HDL 49 10/14/2019     Lab Results   Component Value Date    LDLCALC 76.6 07/21/2020    LDLCALC 69.4 12/02/2019    LDLCALC 69.8 10/14/2019     Lab Results   Component Value Date    TRIG 92 07/21/2020    TRIG 108 12/02/2019    TRIG 61 10/14/2019     Lab Results   Component Value Date    CHOLHDL 30.7 07/21/2020    CHOLHDL 34.5 12/02/2019    CHOLHDL 37.4 10/14/2019       Chemistry        Component Value Date/Time     07/21/2020 0928    K 4.8 07/21/2020 0928     07/21/2020 0928    CO2 27 07/21/2020 0928    BUN 10 07/21/2020 0928    CREATININE 1.1 07/21/2020 0928    GLU 95 07/21/2020 0928        Component Value Date/Time    CALCIUM 9.3 07/21/2020 0928    ALKPHOS 52 (L) 07/21/2020 0928    AST 19 07/21/2020 0928    ALT 16 07/21/2020 0928    BILITOT 0.9 07/21/2020 0928    ESTGFRAFRICA >60.0 07/21/2020 0928    EGFRNONAA >60.0 07/21/2020 0928          Lab Results   Component Value Date    TSH 2.1 05/13/2005     Lab Results   Component Value Date    INR 1.0 06/02/2009     Lab Results   Component Value Date    WBC 6.15 06/02/2009    HGB 15.7 06/02/2009    HCT 46.0 06/02/2009    MCV 90.4 06/02/2009     06/02/2009     BMP  Sodium   Date Value Ref Range Status   07/21/2020 141 136 - 145 mmol/L Final     Potassium "   Date Value Ref Range Status   07/21/2020 4.8 3.5 - 5.1 mmol/L Final     Chloride   Date Value Ref Range Status   07/21/2020 108 95 - 110 mmol/L Final     CO2   Date Value Ref Range Status   07/21/2020 27 23 - 29 mmol/L Final     BUN, Bld   Date Value Ref Range Status   07/21/2020 10 8 - 23 mg/dL Final     Creatinine   Date Value Ref Range Status   07/21/2020 1.1 0.5 - 1.4 mg/dL Final     Calcium   Date Value Ref Range Status   07/21/2020 9.3 8.7 - 10.5 mg/dL Final     Anion Gap   Date Value Ref Range Status   07/21/2020 6 (L) 8 - 16 mmol/L Final     eGFR if    Date Value Ref Range Status   07/21/2020 >60.0 >60 mL/min/1.73 m^2 Final     eGFR if non    Date Value Ref Range Status   07/21/2020 >60.0 >60 mL/min/1.73 m^2 Final     Comment:     Calculation used to obtain the estimated glomerular filtration  rate (eGFR) is the CKD-EPI equation.        CrCl cannot be calculated (Patient's most recent lab result is older than the maximum 7 days allowed.).    Assessment:     1. Coronary artery disease involving native coronary artery of native heart without angina pectoris    2. Essential hypertension    3. ELINA (obstructive sleep apnea)    4. Hyperlipidemia, unspecified hyperlipidemia type    5. S/P CABG (coronary artery bypass graft)    6. S/P PTCA (percutaneous transluminal coronary angioplasty)    7. Abdominal aortic aneurysm (AAA) without rupture      Cad asymptomatic   htn good control   Lipids on target  aaa stable by u/s   Plan:       Continue current therapy  Cardiac low salt diet.  Risk factor modification and excercise program.  F/u in 6 months with lipid cmp

## 2020-09-08 ENCOUNTER — TELEPHONE (OUTPATIENT)
Dept: INTERNAL MEDICINE | Facility: CLINIC | Age: 75
End: 2020-09-08

## 2020-09-08 NOTE — TELEPHONE ENCOUNTER
----- Message from Manoj Byrnes sent at 9/8/2020 11:01 AM CDT -----  Regarding: [phone]  Peter is requesting a callback regarding his appointment on 09/14/2020.  He would like to be advised if he can change that appointment to the [facility]; also he would like to be advised if he can get a flu shot at the same time.    Callback number: [phone]

## 2020-09-08 NOTE — TELEPHONE ENCOUNTER
I tried reaching the pt and there was no answer but, I did reschedule lab for [place] location. //kah

## 2020-09-14 ENCOUNTER — LAB VISIT (OUTPATIENT)
Dept: LAB | Facility: HOSPITAL | Age: 75
End: 2020-09-14
Attending: UROLOGY
Payer: MEDICARE

## 2020-09-14 DIAGNOSIS — C61 PROSTATE CANCER: ICD-10-CM

## 2020-09-14 LAB — COMPLEXED PSA SERPL-MCNC: 6.8 NG/ML (ref 0–4)

## 2020-09-14 PROCEDURE — 36415 COLL VENOUS BLD VENIPUNCTURE: CPT | Mod: PO

## 2020-09-14 PROCEDURE — 84153 ASSAY OF PSA TOTAL: CPT

## 2020-09-15 ENCOUNTER — PATIENT OUTREACH (OUTPATIENT)
Dept: ADMINISTRATIVE | Facility: OTHER | Age: 75
End: 2020-09-15

## 2020-09-15 NOTE — PROGRESS NOTES
LINKS immunization registry updated  Care Everywhere updated  Health Maintenance updated  Chart reviewed for overdue Proactive [facility] Encounters ([facility]) health maintenance testing (CRS, Breast Ca, Diabetic Eye Exam)   Orders entered:N/A

## 2020-09-17 ENCOUNTER — OFFICE VISIT (OUTPATIENT)
Dept: UROLOGY | Facility: CLINIC | Age: 75
End: 2020-09-17
Payer: MEDICARE

## 2020-09-17 VITALS
DIASTOLIC BLOOD PRESSURE: 72 MMHG | SYSTOLIC BLOOD PRESSURE: 142 MMHG | HEART RATE: 52 BPM | BODY MASS INDEX: 27.76 KG/M2 | HEIGHT: 73 IN | WEIGHT: 209.44 LBS

## 2020-09-17 DIAGNOSIS — C61 PROSTATE CANCER: Primary | ICD-10-CM

## 2020-09-17 PROCEDURE — 99999 PR PBB SHADOW E&M-EST. PATIENT-LVL III: CPT | Mod: PBBFAC,,, | Performed by: UROLOGY

## 2020-09-17 PROCEDURE — 99213 OFFICE O/P EST LOW 20 MIN: CPT | Mod: PBBFAC | Performed by: UROLOGY

## 2020-09-17 PROCEDURE — 99999 PR PBB SHADOW E&M-EST. PATIENT-LVL III: ICD-10-PCS | Mod: PBBFAC,,, | Performed by: UROLOGY

## 2020-09-17 PROCEDURE — 99214 PR OFFICE/OUTPT VISIT, EST, LEVL IV, 30-39 MIN: ICD-10-PCS | Mod: S$PBB,,, | Performed by: UROLOGY

## 2020-09-17 PROCEDURE — 99214 OFFICE O/P EST MOD 30 MIN: CPT | Mod: S$PBB,,, | Performed by: UROLOGY

## 2020-09-17 NOTE — PROGRESS NOTES
Clinic Note  9/17/2020      Subjective:         Chief Complaint:   HPI  Peter Caldwell is a 74 y.o. male  with a prostate cancer on AS. No prior TRUS or biopsy. 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 Flomax for LUTS which is helping his urination. Complains of frequency and urgency. High fluid and caffeine intake with sodas. Denies dysuria, nocturia x1, no hematuria, incontinence. Strong stream. Empties his bladder well. PSA today 6.8; was 7.8 in 03/2020     Stage- cT1C  PSA 7.8  PSAD- .057  Biopsy- (8/22/2019) Mary 6 left apex 1/2, 7.7%.  Volume 138 ccs  MRI(3/10/2020)- Volume 160 ccs. 3 lesions 1) LMATZ 1.6 cm PI-RADS 3;  2) LAATZ 1.6 cm PI-RADS 3;  3)RAPZ 1.2 cm PI-RADS 4. Negative extra prostatic extension, seminal vesicle, bladder, nodes.   SILVANA score- 2, low risk   NCCN- very low         Lab Results   Component Value Date    PSA 4.3 (H) 07/13/2016    PSA 3.9 03/18/2015    PSA 4.35 (H) 03/12/2013    PSA 4.15 (H) 12/12/2012    PSA 2.93 09/10/2012    PSA 4.18 (H) 06/07/2012    PSA 3.1 05/17/2011    PSA 2.6 12/15/2009    PSA 2.0 02/04/2005    PSADIAG 6.8 (H) 09/14/2020    PSADIAG 7.8 (H) 03/10/2020    PSADIAG 8.0 (H) 07/30/2019    PSADIAG 9.9 (H) 07/15/2019    PSADIAG 6.5 (H) 01/11/2019    PSADIAG 5.0 (H) 04/18/2017    PSADIAG 5.6 (H) 10/28/2016    PSADIAG 3.2 01/21/2014      Past Medical History:   Diagnosis Date    AAA (abdominal aortic aneurysm)     Arthritis     BPH (benign prostatic hyperplasia)     CAD (coronary artery disease)     khuri    Cataract     Colon polyps 2006    Depression     Elevated PSA     dr robles    Ex-smoker     Hyperlipidemia     Hypertension     Mood disorder     chalasani    ELINA (obstructive sleep apnea)     Osteopenia 4/15 aylin 4/20    Other insomnia 1/7/2019    Prostate cancer 3/12/2020    S/P CABG (coronary artery bypass graft) 8/9/2013    S/P PTCA (percutaneous transluminal coronary angioplasty) 8/9/2013     Family History    Problem Relation Age of Onset    Coronary artery disease Mother     Diabetes Mother     Heart disease Mother     Coronary artery disease Father     Heart disease Father     Heart disease Brother     Heart disease Brother     Amblyopia Neg Hx     Blindness Neg Hx     Cataracts Neg Hx     Glaucoma Neg Hx     Macular degeneration Neg Hx     Retinal detachment Neg Hx     Strabismus Neg Hx      Social History     Socioeconomic History    Marital status:      Spouse name: Not on file    Number of children: 2    Years of education: Not on file    Highest education level: Not on file   Occupational History    Not on file   Social Needs    Financial resource strain: Not hard at all    Food insecurity     Worry: Never true     Inability: Never true    Transportation needs     Medical: No     Non-medical: No   Tobacco Use    Smoking status: Former Smoker    Smokeless tobacco: Never Used   Substance and Sexual Activity    Alcohol use: Yes     Alcohol/week: 1.0 standard drinks     Types: 1 Glasses of wine per week     Frequency: Never     Drinks per session: Patient refused     Binge frequency: Never     Comment: maybe every 6 months    Drug use: No    Sexual activity: Yes   Lifestyle    Physical activity     Days per week: 0 days     Minutes per session: Not on file    Stress: Rather much   Relationships    Social connections     Talks on phone: Twice a week     Gets together: Twice a week     Attends Holiness service: Not on file     Active member of club or organization: Yes     Attends meetings of clubs or organizations: More than 4 times per year     Relationship status:    Other Topics Concern    Not on file   Social History Narrative    Not on file     Past Surgical History:   Procedure Laterality Date    CATARACT EXTRACTION W/  INTRAOCULAR LENS IMPLANT Left 07/26/2016    Dr. Norman    CATARACT EXTRACTION W/  INTRAOCULAR LENS IMPLANT Right 08/09/2016    Dr. Norman     CHOLECYSTECTOMY      CORONARY ANGIOPLASTY      CORONARY ARTERY BYPASS GRAFT      HERNIA REPAIR      ptca      rca     Patient Active Problem List   Diagnosis    Hypertension    CAD (coronary artery disease)    ELINA (obstructive sleep apnea)    Hyperlipidemia    BPH (benign prostatic hyperplasia)    Mood disorder    S/P CABG (coronary artery bypass graft)    S/P PTCA (percutaneous transluminal coronary angioplasty)    Osteopenia    AAA (abdominal aortic aneurysm)    Nuclear sclerosis of both eyes    Vitreous detachment of both eyes    Refractive error    Senile nuclear sclerosis    Post-operative state    Nuclear sclerosis of right eye    Short-term memory loss    Decreased hearing of both ears    Other insomnia    Right hip pain    Situational depression    PCO (posterior capsular opacification), bilateral    Pseudophakia    Prostate cancer    PCO (posterior capsular opacification), right     Review of Systems   Constitutional: Negative for activity change, chills, fever and unexpected weight change.   HENT: Negative for trouble swallowing.    Eyes: Negative for visual disturbance.   Respiratory: Negative for stridor.    Cardiovascular: Negative for chest pain.   Gastrointestinal: Negative for nausea and vomiting.   Genitourinary: Positive for frequency and nocturia. Negative for difficulty urinating and urgency.   Musculoskeletal: Negative for back pain.   Skin: Negative for wound.   Neurological: Negative for weakness.   Hematological: Does not bruise/bleed easily.   Psychiatric/Behavioral: Negative for confusion.     Clinic Note  9/17/2020      Subjective:         Chief Complaint:   HPI  Peter Caldwell is a 74 y.o. male with a prostate cancer on AS. No prior TRUS or biopsy. 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 Flomax for LUTS which is helping his urination. Complains of frequency and urgency. High fluid and caffeine intake with sodas. Denies  dysuria, nocturia x1, no hematuria, incontinence. Strong stream. Empties his bladder well. PSA today 7.8.     Stage- T1C  PSA 7.8  PSAD- .057  Biopsy- (8/22/2019) Mary 6 left apex 1/2, 7.7%.  Volume 138 ccs  MRI(3/10/2020)- Volume 160 ccs. 3 lesions 1) LMATZ 1.6 cm PI-RADS 3;  2) LAATZ 1.6 cm PI-RADS 3;  3)RAPZ 1.2 cm PI-RADS 4. Negative extra prostatic extension, seminal vesicle, bladder, nodes.   SILVANA score- 2, low risk   NCCN- very low         Lab Results   Component Value Date    PSA 4.3 (H) 07/13/2016    PSA 3.9 03/18/2015    PSA 4.35 (H) 03/12/2013    PSA 4.15 (H) 12/12/2012    PSA 2.93 09/10/2012    PSA 4.18 (H) 06/07/2012    PSA 3.1 05/17/2011    PSA 2.6 12/15/2009    PSA 2.0 02/04/2005    PSADIAG 6.8 (H) 09/14/2020    PSADIAG 7.8 (H) 03/10/2020    PSADIAG 8.0 (H) 07/30/2019    PSADIAG 9.9 (H) 07/15/2019    PSADIAG 6.5 (H) 01/11/2019    PSADIAG 5.0 (H) 04/18/2017    PSADIAG 5.6 (H) 10/28/2016    PSADIAG 3.2 01/21/2014      Past Medical History:   Diagnosis Date    AAA (abdominal aortic aneurysm)     Arthritis     BPH (benign prostatic hyperplasia)     CAD (coronary artery disease)     khuri    Cataract     Colon polyps 2006    Depression     Elevated PSA     dr robles    Ex-smoker     Hyperlipidemia     Hypertension     Mood disorder     chalasani    ELINA (obstructive sleep apnea)     Osteopenia 4/15 aylin 4/20    Other insomnia 1/7/2019    Prostate cancer 3/12/2020    S/P CABG (coronary artery bypass graft) 8/9/2013    S/P PTCA (percutaneous transluminal coronary angioplasty) 8/9/2013     Family History   Problem Relation Age of Onset    Coronary artery disease Mother     Diabetes Mother     Heart disease Mother     Coronary artery disease Father     Heart disease Father     Heart disease Brother     Heart disease Brother     Amblyopia Neg Hx     Blindness Neg Hx     Cataracts Neg Hx     Glaucoma Neg Hx     Macular degeneration Neg Hx     Retinal detachment Neg Hx      Strabismus Neg Hx      Social History     Socioeconomic History    Marital status:      Spouse name: Not on file    Number of children: 2    Years of education: Not on file    Highest education level: Not on file   Occupational History    Not on file   Social Needs    Financial resource strain: Not hard at all    Food insecurity     Worry: Never true     Inability: Never true    Transportation needs     Medical: No     Non-medical: No   Tobacco Use    Smoking status: Former Smoker    Smokeless tobacco: Never Used   Substance and Sexual Activity    Alcohol use: Yes     Alcohol/week: 1.0 standard drinks     Types: 1 Glasses of wine per week     Frequency: Never     Drinks per session: Patient refused     Binge frequency: Never     Comment: maybe every 6 months    Drug use: No    Sexual activity: Yes   Lifestyle    Physical activity     Days per week: 0 days     Minutes per session: Not on file    Stress: Rather much   Relationships    Social connections     Talks on phone: Twice a week     Gets together: Twice a week     Attends Shinto service: Not on file     Active member of club or organization: Yes     Attends meetings of clubs or organizations: More than 4 times per year     Relationship status:    Other Topics Concern    Not on file   Social History Narrative    Not on file     Past Surgical History:   Procedure Laterality Date    CATARACT EXTRACTION W/  INTRAOCULAR LENS IMPLANT Left 07/26/2016    Dr. Norman    CATARACT EXTRACTION W/  INTRAOCULAR LENS IMPLANT Right 08/09/2016    Dr. Norman    CHOLECYSTECTOMY      CORONARY ANGIOPLASTY      CORONARY ARTERY BYPASS GRAFT      HERNIA REPAIR      ptca      rca     Patient Active Problem List   Diagnosis    Hypertension    CAD (coronary artery disease)    ELINA (obstructive sleep apnea)    Hyperlipidemia    BPH (benign prostatic hyperplasia)    Mood disorder    S/P CABG (coronary artery bypass graft)    S/P PTCA  "(percutaneous transluminal coronary angioplasty)    Osteopenia    AAA (abdominal aortic aneurysm)    Nuclear sclerosis of both eyes    Vitreous detachment of both eyes    Refractive error    Senile nuclear sclerosis    Post-operative state    Nuclear sclerosis of right eye    Short-term memory loss    Decreased hearing of both ears    Other insomnia    Right hip pain    Situational depression    PCO (posterior capsular opacification), bilateral    Pseudophakia    Prostate cancer    PCO (posterior capsular opacification), right     Review of Systems   Constitutional: Negative for activity change, appetite change, diaphoresis, fever and unexpected weight change.   HENT: Negative.    Eyes: Negative.    Respiratory: Negative for cough, shortness of breath and wheezing.    Cardiovascular: Negative for chest pain, palpitations and leg swelling.   Gastrointestinal: Negative for abdominal pain, blood in stool, constipation, diarrhea, nausea and vomiting.   Genitourinary: Positive for frequency and urgency. Negative for dysuria, hematuria and nocturia.   Musculoskeletal: Negative for back pain.   Skin: Negative for color change, rash and wound.   Neurological: Negative for dizziness, weakness, numbness and headaches.   Hematological: Does not bruise/bleed easily.   Psychiatric/Behavioral: Negative.          Objective:      BP (!) 142/72 (BP Location: Right arm, Patient Position: Sitting, BP Method: Medium (Automatic))   Pulse (!) 52   Ht 6' 1" (1.854 m)   Wt 95 kg (209 lb 7 oz)   BMI 27.63 kg/m²   Estimated body mass index is 27.63 kg/m² as calculated from the following:    Height as of this encounter: 6' 1" (1.854 m).    Weight as of this encounter: 95 kg (209 lb 7 oz).  Physical Exam   Constitutional: He is oriented to person, place, and time. He appears well-developed and well-nourished. No distress.   Cardiovascular: Normal rate and intact distal pulses.    Pulmonary/Chest: Effort normal. No " respiratory distress.   Abdominal: Soft. He exhibits no distension. There is no tenderness.   Genitourinary:   Genitourinary Comments: Circumsized penis. Orthotopic meatus, no stenosis. No plaques, ulcers, or lesions along penis. Testes palpated bilaterally, no masses. Normal cord structures. Scrotum normal without lesions. Prostate >50 grams, smooth without nodularity. Right direct inguinal hernia, not incarcerated.  Musculoskeletal: Normal range of motion. He exhibits no tenderness.   Neurological: He is alert and oriented to person, place, and time.   Skin: Skin is warm and dry. No rash noted.     Psychiatric: He has a normal mood and affect. Judgment normal.         Assessment and Plan:           Problem List Items Addressed This Visit        Oncology    Prostate cancer - Primary    Relevant Orders    MRI Prostate W W/O Contrast    PROSTATE SPECIFIC ANTIGEN, DIAGNOSTIC              Follow up:   6 months with PSA, MRI.   Recommend decreasing fluid intake, particularly after dinner, to improve frequency and nocturia.   Jem Lam        Problem List Items Addressed This Visit        Oncology    Prostate cancer - Primary    Relevant Orders    MRI Prostate W W/O Contrast    PROSTATE SPECIFIC ANTIGEN, DIAGNOSTIC          Follow up:       Jem Lam

## 2020-09-18 ENCOUNTER — PATIENT OUTREACH (OUTPATIENT)
Dept: OTHER | Facility: OTHER | Age: 75
End: 2020-09-18

## 2020-09-30 ENCOUNTER — PATIENT OUTREACH (OUTPATIENT)
Dept: OTHER | Facility: OTHER | Age: 75
End: 2020-09-30

## 2020-09-30 ENCOUNTER — PATIENT MESSAGE (OUTPATIENT)
Dept: OTHER | Facility: OTHER | Age: 75
End: 2020-09-30

## 2020-10-06 ENCOUNTER — PATIENT MESSAGE (OUTPATIENT)
Dept: ADMINISTRATIVE | Facility: HOSPITAL | Age: 75
End: 2020-10-06

## 2020-12-02 ENCOUNTER — PATIENT OUTREACH (OUTPATIENT)
Dept: OTHER | Facility: OTHER | Age: 75
End: 2020-12-02

## 2020-12-02 NOTE — PROGRESS NOTES
Digital Medicine: Health  Follow-Up    The history is provided by the patient.             Reason for review: Blood pressure at goal        Topics Covered on Call: physical activity and Diet    Additional Follow-up details: Patient reports he is well, no complaints. Denies symptoms of hypertension- HA, chest pains and SOB.     Discussed COVID-19 and importance of proper hand hygiene and social distancing. Reports he received a call that he is eligible for COVID-19 vaccine. Reports he is interested in this and planning to do so.     Patient denies further needs from the program today.     Reports he stays home a lot during this time[place] [facility] Was able to see his family during Thanksgiving.             Diet-no change to diet    No change to diet.  Patient reports eating or drinking the following: Denies changes to diet since last call.       Physical Activity-Change  7 day(s) a week.     Additional physical activity details: Stretching exercises in the morning, weight exercises later in the day- dumbbells. [place], spends a lot of time outside. Reports he has been able to remain physically active during this time. Encouraged patient to continue this. Reports he recently pulled a muscle doing some yard work, but recovering from this.      Medication Adherence-Medication adherence was assessed.        Substance, Sleep, Stress-No change  stress-  Details:  Intervention(s):    Sleep-  Details:  Intervention(s):    Alcohol -  Details:  Intervention(s):    Tobacco-  Details:  Intervention(s):          Continue current diet/physical activity routine.  Provided patient education.       Addressed patient questions and patient has my contact information if needed prior to next outreach. Patient verbalizes understanding.      Explained the importance of self-monitoring and medication adherence. Encouraged the patient to communicate with their health  for lifestyle  modifications to help improve or maintain a healthy lifestyle.               There are no preventive care reminders to display for this patient.      Last 5 Patient Entered Readings                                      Current 30 Day Average: 119/72     Recent Readings 11/29/2020 11/22/2020 11/22/2020 11/21/2020 11/17/2020    SBP (mmHg) 112 117 112 115 115    DBP (mmHg) 70 73 69 70 68    Pulse 62 62 63 57 54

## 2020-12-11 ENCOUNTER — HOSPITAL ENCOUNTER (OUTPATIENT)
Dept: RADIOLOGY | Facility: HOSPITAL | Age: 75
Discharge: HOME | End: 2020-12-11
Attending: FAMILY MEDICINE
Payer: MEDICARE

## 2020-12-11 DIAGNOSIS — I71.40 ABDOMINAL AORTIC ANEURYSM (AAA) WITHOUT RUPTURE: ICD-10-CM

## 2020-12-11 PROCEDURE — 76775 US EXAM ABDO BACK WALL LIM: CPT | Mod: TC

## 2020-12-11 PROCEDURE — 76775 US ABDOMINAL AORTA: ICD-10-PCS | Mod: 26,,, | Performed by: RADIOLOGY

## 2020-12-11 PROCEDURE — 76775 US EXAM ABDO BACK WALL LIM: CPT | Mod: 26,,, | Performed by: RADIOLOGY

## 2020-12-15 ENCOUNTER — PATIENT OUTREACH (OUTPATIENT)
Dept: OTHER | Facility: OTHER | Age: 75
End: 2020-12-15

## 2020-12-15 NOTE — PROGRESS NOTES
Digital Medicine: Clinician Follow-Up    Calling patient due to recent upward trend in BP    The history is provided by the patient.   Follow-up reason(s): routine follow up.     Hypertension    Readings are trending up due to lifestyle change and Took multiple readings on 12/13 due to concern about proper BP technique but does not think his technique has caused recent elevations. .    Patient is not experiencing signs/symptoms of hypotension.  Patient is not experiencing signs/symptoms of hypertension.      Additional Follow-up details: Started taking vitamin D supplement and cough medicine. Denies taking decongestant.   Patient reports he charges his BP cuff regularly .    Has not been as active in doing yard work due to cold weather.           Last 5 Patient Entered Readings                                      Current 30 Day Average: 127/74     Recent Readings 12/15/2020 12/14/2020 12/14/2020 12/13/2020 12/13/2020    SBP (mmHg) 130 130 141 128 143    DBP (mmHg) 79 80 80 72 75    Pulse 59 56 56 58 57                 Depression Screening  Did not address depression screening.    Sleep Apnea Screening    Did not address sleep apnea screening.     Medication Affordability Screening  Did not address medication affordability screening.     Medication Adherence-Medication adherence was assessed.          ASSESSMENT(S)  Patients BP average is 127/74 mmHg, which is at goal. Patient's BP goal is less than or equal to 130/80.    Slight upward trend in BP. Unsure if cold weather has contributed to change in patients BP due to slight change in normal activity level. Patient has been more sedentary with change in weather.       Hypertension Plan  Continue current therapy.  Instructed to charge device.  Provided patient education. Reviewed proper BP monitoring technique   Will continue to monitor and reassess if medications changes needed in the future.      Addressed patient questions and patient has my contact information  if needed prior to next outreach. Patient verbalizes understanding.             There are no preventive care reminders to display for this patient.  There are no preventive care reminders to display for this patient.      Hypertension Medications             metoprolol succinate (TOPROL-XL) 50 MG 24 hr tablet Take 1 tablet (50 mg total) by mouth every evening.

## 2020-12-16 ENCOUNTER — PATIENT MESSAGE (OUTPATIENT)
Dept: OTHER | Facility: OTHER | Age: 75
End: 2020-12-16

## 2020-12-17 ENCOUNTER — PATIENT OUTREACH (OUTPATIENT)
Dept: OTHER | Facility: OTHER | Age: 75
End: 2020-12-17

## 2020-12-17 NOTE — PROGRESS NOTES
Digital Medicine: Health  Follow-Up    The history is provided by the patient.                   Additional Follow-up details: Patient called back to inform us that he really appreciated our calls and professionalism . Reports he was confused between Health  and Clinician calls because he saved Wilda's number under my name. Sent patient direct numbers so he can correct this in his phone.    Patient denies further needs from the program today.                Continue current diet/physical activity routine.  Provided patient education.       Addressed patient questions and patient has my contact information if needed prior to next outreach. Patient verbalizes understanding.      Explained the importance of self-monitoring and medication adherence. Encouraged the patient to communicate with their health  for lifestyle modifications to help improve or maintain a healthy lifestyle.               There are no preventive care reminders to display for this patient.      Last 5 Patient Entered Readings                                      Current 30 Day Average: 127/75     Recent Readings 12/17/2020 12/16/2020 12/15/2020 12/15/2020 12/15/2020    SBP (mmHg) 122 122 147 152 159    DBP (mmHg) 53 66 84 82 93    Pulse 64 57 57 56 57

## 2021-01-08 ENCOUNTER — IMMUNIZATION (OUTPATIENT)
Dept: INTERNAL MEDICINE | Facility: CLINIC | Age: 76
End: 2021-01-08
Payer: MEDICARE

## 2021-01-08 DIAGNOSIS — Z23 NEED FOR VACCINATION: ICD-10-CM

## 2021-01-08 PROCEDURE — 91300 COVID-19, MRNA, LNP-S, PF, 30 MCG/0.3 ML DOSE VACCINE: CPT | Mod: PBBFAC

## 2021-01-17 ENCOUNTER — PATIENT MESSAGE (OUTPATIENT)
Dept: ADMINISTRATIVE | Facility: OTHER | Age: 76
End: 2021-01-17

## 2021-01-26 ENCOUNTER — TELEPHONE (OUTPATIENT)
Dept: CARDIOLOGY | Facility: CLINIC | Age: 76
End: 2021-01-26

## 2021-01-29 ENCOUNTER — IMMUNIZATION (OUTPATIENT)
Dept: INTERNAL MEDICINE | Facility: CLINIC | Age: 76
End: 2021-01-29
Payer: MEDICARE

## 2021-01-29 DIAGNOSIS — Z23 NEED FOR VACCINATION: Primary | ICD-10-CM

## 2021-01-29 PROCEDURE — 91300 COVID-19, MRNA, LNP-S, PF, 30 MCG/0.3 ML DOSE VACCINE: CPT | Mod: PBBFAC

## 2021-01-29 PROCEDURE — 0002A COVID-19, MRNA, LNP-S, PF, 30 MCG/0.3 ML DOSE VACCINE: CPT | Mod: PBBFAC

## 2021-02-03 ENCOUNTER — LAB VISIT (OUTPATIENT)
Dept: LAB | Facility: HOSPITAL | Age: 76
End: 2021-02-03
Attending: INTERNAL MEDICINE
Payer: MEDICARE

## 2021-02-03 DIAGNOSIS — I25.10 CORONARY ARTERY DISEASE INVOLVING NATIVE CORONARY ARTERY OF NATIVE HEART WITHOUT ANGINA PECTORIS: ICD-10-CM

## 2021-02-03 PROCEDURE — 36415 COLL VENOUS BLD VENIPUNCTURE: CPT | Mod: PO

## 2021-02-03 PROCEDURE — 80053 COMPREHEN METABOLIC PANEL: CPT

## 2021-02-03 PROCEDURE — 80061 LIPID PANEL: CPT

## 2021-02-04 LAB
ALBUMIN SERPL BCP-MCNC: 3.8 G/DL (ref 3.5–5.2)
ALP SERPL-CCNC: 54 U/L (ref 55–135)
ALT SERPL W/O P-5'-P-CCNC: 22 U/L (ref 10–44)
ANION GAP SERPL CALC-SCNC: 10 MMOL/L (ref 8–16)
AST SERPL-CCNC: 19 U/L (ref 10–40)
BILIRUB SERPL-MCNC: 0.8 MG/DL (ref 0.1–1)
BUN SERPL-MCNC: 10 MG/DL (ref 8–23)
CALCIUM SERPL-MCNC: 9 MG/DL (ref 8.7–10.5)
CHLORIDE SERPL-SCNC: 107 MMOL/L (ref 95–110)
CHOLEST SERPL-MCNC: 133 MG/DL (ref 120–199)
CHOLEST/HDLC SERPL: 3 {RATIO} (ref 2–5)
CO2 SERPL-SCNC: 25 MMOL/L (ref 23–29)
CREAT SERPL-MCNC: 1 MG/DL (ref 0.5–1.4)
EST. GFR  (AFRICAN AMERICAN): >60 ML/MIN/1.73 M^2
EST. GFR  (NON AFRICAN AMERICAN): >60 ML/MIN/1.73 M^2
GLUCOSE SERPL-MCNC: 97 MG/DL (ref 70–110)
HDLC SERPL-MCNC: 44 MG/DL (ref 40–75)
HDLC SERPL: 33.1 % (ref 20–50)
LDLC SERPL CALC-MCNC: 69.6 MG/DL (ref 63–159)
NONHDLC SERPL-MCNC: 89 MG/DL
POTASSIUM SERPL-SCNC: 4.4 MMOL/L (ref 3.5–5.1)
PROT SERPL-MCNC: 6.5 G/DL (ref 6–8.4)
SODIUM SERPL-SCNC: 142 MMOL/L (ref 136–145)
TRIGL SERPL-MCNC: 97 MG/DL (ref 30–150)

## 2021-02-24 ENCOUNTER — OFFICE VISIT (OUTPATIENT)
Dept: CARDIOLOGY | Facility: CLINIC | Age: 76
End: 2021-02-24
Payer: MEDICARE

## 2021-02-24 VITALS
WEIGHT: 206.13 LBS | DIASTOLIC BLOOD PRESSURE: 62 MMHG | BODY MASS INDEX: 27.2 KG/M2 | OXYGEN SATURATION: 96 % | HEART RATE: 63 BPM | SYSTOLIC BLOOD PRESSURE: 125 MMHG

## 2021-02-24 DIAGNOSIS — I25.10 CORONARY ARTERY DISEASE INVOLVING NATIVE CORONARY ARTERY OF NATIVE HEART WITHOUT ANGINA PECTORIS: ICD-10-CM

## 2021-02-24 DIAGNOSIS — E78.5 HYPERLIPIDEMIA, UNSPECIFIED HYPERLIPIDEMIA TYPE: ICD-10-CM

## 2021-02-24 DIAGNOSIS — Z98.61 S/P PTCA (PERCUTANEOUS TRANSLUMINAL CORONARY ANGIOPLASTY): ICD-10-CM

## 2021-02-24 DIAGNOSIS — Z95.1 S/P CABG (CORONARY ARTERY BYPASS GRAFT): ICD-10-CM

## 2021-02-24 DIAGNOSIS — I10 ESSENTIAL HYPERTENSION: Primary | ICD-10-CM

## 2021-02-24 DIAGNOSIS — G47.33 OSA (OBSTRUCTIVE SLEEP APNEA): ICD-10-CM

## 2021-02-24 DIAGNOSIS — I71.40 ABDOMINAL AORTIC ANEURYSM (AAA) WITHOUT RUPTURE: ICD-10-CM

## 2021-02-24 PROCEDURE — 99213 OFFICE O/P EST LOW 20 MIN: CPT | Mod: PBBFAC | Performed by: INTERNAL MEDICINE

## 2021-02-24 PROCEDURE — 99999 PR PBB SHADOW E&M-EST. PATIENT-LVL III: CPT | Mod: PBBFAC,,, | Performed by: INTERNAL MEDICINE

## 2021-02-24 PROCEDURE — 99214 OFFICE O/P EST MOD 30 MIN: CPT | Mod: S$PBB,,, | Performed by: INTERNAL MEDICINE

## 2021-02-24 PROCEDURE — 99999 PR PBB SHADOW E&M-EST. PATIENT-LVL III: ICD-10-PCS | Mod: PBBFAC,,, | Performed by: INTERNAL MEDICINE

## 2021-02-24 PROCEDURE — 99214 PR OFFICE/OUTPT VISIT, EST, LEVL IV, 30-39 MIN: ICD-10-PCS | Mod: S$PBB,,, | Performed by: INTERNAL MEDICINE

## 2021-03-10 ENCOUNTER — LAB VISIT (OUTPATIENT)
Dept: LAB | Facility: HOSPITAL | Age: 76
End: 2021-03-10
Attending: STUDENT IN AN ORGANIZED HEALTH CARE EDUCATION/TRAINING PROGRAM
Payer: MEDICARE

## 2021-03-10 DIAGNOSIS — C61 PROSTATE CANCER: ICD-10-CM

## 2021-03-10 LAB — COMPLEXED PSA SERPL-MCNC: 9.8 NG/ML (ref 0–4)

## 2021-03-10 PROCEDURE — 84153 ASSAY OF PSA TOTAL: CPT | Performed by: STUDENT IN AN ORGANIZED HEALTH CARE EDUCATION/TRAINING PROGRAM

## 2021-03-10 PROCEDURE — 36415 COLL VENOUS BLD VENIPUNCTURE: CPT | Mod: PO | Performed by: STUDENT IN AN ORGANIZED HEALTH CARE EDUCATION/TRAINING PROGRAM

## 2021-03-11 ENCOUNTER — HOSPITAL ENCOUNTER (OUTPATIENT)
Dept: RADIOLOGY | Facility: HOSPITAL | Age: 76
Discharge: HOME | End: 2021-03-11
Attending: STUDENT IN AN ORGANIZED HEALTH CARE EDUCATION/TRAINING PROGRAM
Payer: MEDICARE

## 2021-03-11 DIAGNOSIS — C61 PROSTATE CANCER: ICD-10-CM

## 2021-03-11 PROCEDURE — A9585 GADOBUTROL INJECTION: HCPCS | Performed by: STUDENT IN AN ORGANIZED HEALTH CARE EDUCATION/TRAINING PROGRAM

## 2021-03-11 PROCEDURE — 72197 MRI PELVIS W/O & W/DYE: CPT | Mod: TC

## 2021-03-11 PROCEDURE — 25500020 PHARM REV CODE 255: Performed by: STUDENT IN AN ORGANIZED HEALTH CARE EDUCATION/TRAINING PROGRAM

## 2021-03-11 PROCEDURE — 72197 MRI PELVIS W/O & W/DYE: CPT | Mod: 26,,, | Performed by: RADIOLOGY

## 2021-03-11 PROCEDURE — 72197 MRI PROSTATE W W/O CONTRAST: ICD-10-PCS | Mod: 26,,, | Performed by: RADIOLOGY

## 2021-03-11 RX ORDER — GADOBUTROL 604.72 MG/ML
10 INJECTION INTRAVENOUS
Status: COMPLETED | OUTPATIENT
Start: 2021-03-11 | End: 2021-03-11

## 2021-03-11 RX ADMIN — GADOBUTROL 10 ML: 604.72 INJECTION INTRAVENOUS at 09:03

## 2021-03-18 ENCOUNTER — PATIENT MESSAGE (OUTPATIENT)
Dept: RESEARCH | Facility: HOSPITAL | Age: 76
End: 2021-03-18

## 2021-03-18 ENCOUNTER — OFFICE VISIT (OUTPATIENT)
Dept: UROLOGY | Facility: CLINIC | Age: 76
End: 2021-03-18
Payer: MEDICARE

## 2021-03-18 VITALS
HEIGHT: 73 IN | HEART RATE: 52 BPM | SYSTOLIC BLOOD PRESSURE: 118 MMHG | DIASTOLIC BLOOD PRESSURE: 68 MMHG | WEIGHT: 208.56 LBS | BODY MASS INDEX: 27.64 KG/M2

## 2021-03-18 DIAGNOSIS — C61 PROSTATE CANCER: Primary | ICD-10-CM

## 2021-03-18 PROCEDURE — 99214 PR OFFICE/OUTPT VISIT, EST, LEVL IV, 30-39 MIN: ICD-10-PCS | Mod: S$PBB,,, | Performed by: UROLOGY

## 2021-03-18 PROCEDURE — 99214 OFFICE O/P EST MOD 30 MIN: CPT | Mod: S$PBB,,, | Performed by: UROLOGY

## 2021-03-18 PROCEDURE — 99999 PR PBB SHADOW E&M-EST. PATIENT-LVL III: CPT | Mod: PBBFAC,,, | Performed by: UROLOGY

## 2021-03-18 PROCEDURE — 99213 OFFICE O/P EST LOW 20 MIN: CPT | Mod: PBBFAC | Performed by: UROLOGY

## 2021-03-18 PROCEDURE — 99999 PR PBB SHADOW E&M-EST. PATIENT-LVL III: ICD-10-PCS | Mod: PBBFAC,,, | Performed by: UROLOGY

## 2021-03-18 RX ORDER — LIDOCAINE HYDROCHLORIDE 10 MG/ML
10 INJECTION INFILTRATION; PERINEURAL ONCE
Status: DISCONTINUED | OUTPATIENT
Start: 2021-03-18 | End: 2023-10-24

## 2021-03-18 RX ORDER — CIPROFLOXACIN 500 MG/1
500 TABLET ORAL 2 TIMES DAILY
Qty: 4 TABLET | Refills: 0 | Status: SHIPPED | OUTPATIENT
Start: 2021-03-18 | End: 2021-03-20

## 2021-03-18 RX ORDER — LIDOCAINE HYDROCHLORIDE 20 MG/ML
JELLY TOPICAL ONCE
Status: DISCONTINUED | OUTPATIENT
Start: 2021-03-18 | End: 2023-10-24

## 2021-03-18 RX ORDER — TAMSULOSIN HYDROCHLORIDE 0.4 MG/1
1 CAPSULE ORAL DAILY
Qty: 90 CAPSULE | Refills: 3 | Status: SHIPPED | OUTPATIENT
Start: 2021-03-18 | End: 2022-03-20

## 2021-03-25 ENCOUNTER — PATIENT MESSAGE (OUTPATIENT)
Dept: ADMINISTRATIVE | Facility: HOSPITAL | Age: 76
End: 2021-03-25

## 2021-03-26 ENCOUNTER — PATIENT MESSAGE (OUTPATIENT)
Dept: RESEARCH | Facility: HOSPITAL | Age: 76
End: 2021-03-26

## 2021-03-26 DIAGNOSIS — I25.10 CORONARY ARTERY DISEASE, ANGINA PRESENCE UNSPECIFIED, UNSPECIFIED VESSEL OR LESION TYPE, UNSPECIFIED WHETHER NATIVE OR TRANSPLANTED HEART: Primary | ICD-10-CM

## 2021-03-26 RX ORDER — ATORVASTATIN CALCIUM 80 MG/1
TABLET, FILM COATED ORAL
Qty: 90 TABLET | Refills: 3 | Status: SHIPPED | OUTPATIENT
Start: 2021-03-26 | End: 2022-03-18

## 2021-04-06 ENCOUNTER — PROCEDURE VISIT (OUTPATIENT)
Dept: UROLOGY | Facility: CLINIC | Age: 76
End: 2021-04-06
Payer: MEDICARE

## 2021-04-06 VITALS
WEIGHT: 206.69 LBS | TEMPERATURE: 98 F | SYSTOLIC BLOOD PRESSURE: 128 MMHG | RESPIRATION RATE: 17 BRPM | DIASTOLIC BLOOD PRESSURE: 60 MMHG | HEART RATE: 57 BPM | BODY MASS INDEX: 27.39 KG/M2 | HEIGHT: 73 IN

## 2021-04-06 DIAGNOSIS — C61 PROSTATE CANCER: ICD-10-CM

## 2021-04-06 PROCEDURE — 55700 TRANSRECTAL ULTRASOUND W/ BIOPSY: CPT | Mod: PBBFAC | Performed by: UROLOGY

## 2021-04-06 PROCEDURE — 76872 US TRANSRECTAL: CPT | Mod: 26,S$PBB,, | Performed by: UROLOGY

## 2021-04-06 PROCEDURE — 76872 TRANSRECTAL ULTRASOUND W/ BIOPSY: ICD-10-PCS | Mod: 26,S$PBB,, | Performed by: UROLOGY

## 2021-04-06 RX ORDER — LIDOCAINE HYDROCHLORIDE 20 MG/ML
JELLY TOPICAL ONCE
Status: COMPLETED | OUTPATIENT
Start: 2021-04-06 | End: 2021-04-06

## 2021-04-06 RX ORDER — LIDOCAINE HYDROCHLORIDE 10 MG/ML
20 INJECTION INFILTRATION; PERINEURAL
Status: COMPLETED | OUTPATIENT
Start: 2021-04-06 | End: 2022-02-01

## 2021-04-06 RX ADMIN — LIDOCAINE HYDROCHLORIDE: 20 JELLY TOPICAL at 09:04

## 2021-04-08 ENCOUNTER — PATIENT MESSAGE (OUTPATIENT)
Dept: UROLOGY | Facility: CLINIC | Age: 76
End: 2021-04-08

## 2021-04-11 ENCOUNTER — PATIENT MESSAGE (OUTPATIENT)
Dept: ADMINISTRATIVE | Facility: HOSPITAL | Age: 76
End: 2021-04-11

## 2021-04-30 PROCEDURE — 99457 PR MONITORING, PHYSIOL PARAM, REMOTE, 1ST 20 MINS, PER MONTH: ICD-10-PCS | Mod: S$PBB,,, | Performed by: FAMILY MEDICINE

## 2021-04-30 PROCEDURE — 99457 RPM TX MGMT 1ST 20 MIN: CPT | Mod: S$PBB,,, | Performed by: FAMILY MEDICINE

## 2021-05-04 ENCOUNTER — TELEPHONE (OUTPATIENT)
Dept: UROLOGY | Facility: CLINIC | Age: 76
End: 2021-05-04

## 2021-05-04 DIAGNOSIS — C61 PROSTATE CANCER: Primary | ICD-10-CM

## 2021-05-06 ENCOUNTER — PATIENT MESSAGE (OUTPATIENT)
Dept: SURGERY | Facility: HOSPITAL | Age: 76
End: 2021-05-06

## 2021-05-07 ENCOUNTER — TELEPHONE (OUTPATIENT)
Dept: PREADMISSION TESTING | Facility: HOSPITAL | Age: 76
End: 2021-05-07

## 2021-05-10 ENCOUNTER — LAB VISIT (OUTPATIENT)
Dept: INTERNAL MEDICINE | Facility: CLINIC | Age: 76
End: 2021-05-10
Payer: MEDICARE

## 2021-05-10 ENCOUNTER — HOSPITAL ENCOUNTER (OUTPATIENT)
Dept: CARDIOLOGY | Facility: CLINIC | Age: 76
Discharge: HOME | End: 2021-05-10
Payer: MEDICARE

## 2021-05-10 DIAGNOSIS — Z01.818 PREOPERATIVE TESTING: ICD-10-CM

## 2021-05-10 DIAGNOSIS — C61 PROSTATE CANCER: ICD-10-CM

## 2021-05-10 PROCEDURE — 93010 EKG 12-LEAD: ICD-10-PCS | Mod: S$PBB,,, | Performed by: INTERNAL MEDICINE

## 2021-05-10 PROCEDURE — U0005 INFEC AGEN DETEC AMPLI PROBE: HCPCS | Performed by: UROLOGY

## 2021-05-10 PROCEDURE — U0003 INFECTIOUS AGENT DETECTION BY NUCLEIC ACID (DNA OR RNA); SEVERE ACUTE RESPIRATORY SYNDROME CORONAVIRUS 2 (SARS-COV-2) (CORONAVIRUS DISEASE [COVID-19]), AMPLIFIED PROBE TECHNIQUE, MAKING USE OF HIGH THROUGHPUT TECHNOLOGIES AS DESCRIBED BY CMS-2020-01-R: HCPCS | Performed by: UROLOGY

## 2021-05-10 PROCEDURE — 93010 ELECTROCARDIOGRAM REPORT: CPT | Mod: S$PBB,,, | Performed by: INTERNAL MEDICINE

## 2021-05-10 PROCEDURE — 93005 ELECTROCARDIOGRAM TRACING: CPT | Mod: PBBFAC | Performed by: INTERNAL MEDICINE

## 2021-05-11 LAB — SARS-COV-2 RNA RESP QL NAA+PROBE: NOT DETECTED

## 2021-05-13 ENCOUNTER — HOSPITAL ENCOUNTER (OUTPATIENT)
Facility: HOSPITAL | Age: 76
Discharge: HOME | End: 2021-05-13
Attending: UROLOGY | Admitting: UROLOGY
Payer: MEDICARE

## 2021-05-13 ENCOUNTER — ANESTHESIA (OUTPATIENT)
Dept: SURGERY | Facility: HOSPITAL | Age: 76
End: 2021-05-13
Payer: MEDICARE

## 2021-05-13 ENCOUNTER — ANESTHESIA EVENT (OUTPATIENT)
Dept: SURGERY | Facility: HOSPITAL | Age: 76
End: 2021-05-13
Payer: MEDICARE

## 2021-05-13 VITALS
SYSTOLIC BLOOD PRESSURE: 115 MMHG | TEMPERATURE: 98 F | HEART RATE: 57 BPM | BODY MASS INDEX: 27.41 KG/M2 | WEIGHT: 206.81 LBS | RESPIRATION RATE: 18 BRPM | OXYGEN SATURATION: 98 % | HEIGHT: 73 IN | DIASTOLIC BLOOD PRESSURE: 67 MMHG

## 2021-05-13 DIAGNOSIS — Z01.818 PREOPERATIVE TESTING: ICD-10-CM

## 2021-05-13 DIAGNOSIS — R97.20 ELEVATED PSA: Primary | ICD-10-CM

## 2021-05-13 PROCEDURE — 88342 IMHCHEM/IMCYTCHM 1ST ANTB: CPT | Performed by: PATHOLOGY

## 2021-05-13 PROCEDURE — 88342 IMHCHEM/IMCYTCHM 1ST ANTB: CPT | Mod: 26,,, | Performed by: PATHOLOGY

## 2021-05-13 PROCEDURE — D9220A PRA ANESTHESIA: Mod: CRNA,,, | Performed by: NURSE ANESTHETIST, CERTIFIED REGISTERED

## 2021-05-13 PROCEDURE — 37000008 HC ANESTHESIA 1ST 15 MINUTES: Performed by: UROLOGY

## 2021-05-13 PROCEDURE — 25000003 PHARM REV CODE 250: Performed by: NURSE ANESTHETIST, CERTIFIED REGISTERED

## 2021-05-13 PROCEDURE — 76872 US TRANSRECTAL: CPT | Mod: 26,,, | Performed by: UROLOGY

## 2021-05-13 PROCEDURE — 88305 TISSUE EXAM BY PATHOLOGIST: ICD-10-PCS | Mod: 26,,, | Performed by: PATHOLOGY

## 2021-05-13 PROCEDURE — 88305 TISSUE EXAM BY PATHOLOGIST: CPT | Mod: 26,,, | Performed by: PATHOLOGY

## 2021-05-13 PROCEDURE — 76872 PR US TRANSRECTAL: ICD-10-PCS | Mod: 26,,, | Performed by: UROLOGY

## 2021-05-13 PROCEDURE — 36000705 HC OR TIME LEV I EA ADD 15 MIN: Performed by: UROLOGY

## 2021-05-13 PROCEDURE — 88305 TISSUE EXAM BY PATHOLOGIST: CPT | Mod: 59 | Performed by: PATHOLOGY

## 2021-05-13 PROCEDURE — 55700 PR BIOPSY OF PROSTATE,NEEDLE/PUNCH: CPT | Mod: ,,, | Performed by: UROLOGY

## 2021-05-13 PROCEDURE — D9220A PRA ANESTHESIA: ICD-10-PCS | Mod: ANES,,, | Performed by: ANESTHESIOLOGY

## 2021-05-13 PROCEDURE — 71000015 HC POSTOP RECOV 1ST HR: Performed by: UROLOGY

## 2021-05-13 PROCEDURE — 88341 IMHCHEM/IMCYTCHM EA ADD ANTB: CPT | Mod: 26,,, | Performed by: PATHOLOGY

## 2021-05-13 PROCEDURE — 88341 IMHCHEM/IMCYTCHM EA ADD ANTB: CPT | Mod: 59 | Performed by: PATHOLOGY

## 2021-05-13 PROCEDURE — 25000003 PHARM REV CODE 250: Performed by: STUDENT IN AN ORGANIZED HEALTH CARE EDUCATION/TRAINING PROGRAM

## 2021-05-13 PROCEDURE — 25000003 PHARM REV CODE 250: Performed by: UROLOGY

## 2021-05-13 PROCEDURE — 63600175 PHARM REV CODE 636 W HCPCS: Performed by: NURSE ANESTHETIST, CERTIFIED REGISTERED

## 2021-05-13 PROCEDURE — 71000044 HC DOSC ROUTINE RECOVERY FIRST HOUR: Performed by: UROLOGY

## 2021-05-13 PROCEDURE — 88342 CHG IMMUNOCYTOCHEMISTRY: ICD-10-PCS | Mod: 26,,, | Performed by: PATHOLOGY

## 2021-05-13 PROCEDURE — 36000704 HC OR TIME LEV I 1ST 15 MIN: Performed by: UROLOGY

## 2021-05-13 PROCEDURE — 55700 PR BIOPSY OF PROSTATE,NEEDLE/PUNCH: ICD-10-PCS | Mod: ,,, | Performed by: UROLOGY

## 2021-05-13 PROCEDURE — 37000009 HC ANESTHESIA EA ADD 15 MINS: Performed by: UROLOGY

## 2021-05-13 PROCEDURE — D9220A PRA ANESTHESIA: Mod: ANES,,, | Performed by: ANESTHESIOLOGY

## 2021-05-13 PROCEDURE — 88341 PR IHC OR ICC EACH ADD'L SINGLE ANTIBODY  STAINPR: ICD-10-PCS | Mod: 26,,, | Performed by: PATHOLOGY

## 2021-05-13 PROCEDURE — D9220A PRA ANESTHESIA: ICD-10-PCS | Mod: CRNA,,, | Performed by: NURSE ANESTHETIST, CERTIFIED REGISTERED

## 2021-05-13 RX ORDER — PROPOFOL 10 MG/ML
VIAL (ML) INTRAVENOUS CONTINUOUS PRN
Status: DISCONTINUED | OUTPATIENT
Start: 2021-05-13 | End: 2021-05-13

## 2021-05-13 RX ORDER — LIDOCAINE HYDROCHLORIDE 20 MG/ML
INJECTION, SOLUTION EPIDURAL; INFILTRATION; INTRACAUDAL; PERINEURAL
Status: DISCONTINUED | OUTPATIENT
Start: 2021-05-13 | End: 2021-05-13

## 2021-05-13 RX ORDER — KETOROLAC TROMETHAMINE 30 MG/ML
INJECTION, SOLUTION INTRAMUSCULAR; INTRAVENOUS
Status: DISCONTINUED | OUTPATIENT
Start: 2021-05-13 | End: 2021-05-13

## 2021-05-13 RX ORDER — LIDOCAINE HYDROCHLORIDE 20 MG/ML
JELLY TOPICAL
Status: DISCONTINUED | OUTPATIENT
Start: 2021-05-13 | End: 2021-05-13 | Stop reason: HOSPADM

## 2021-05-13 RX ORDER — CIPROFLOXACIN 2 MG/ML
400 INJECTION, SOLUTION INTRAVENOUS
Status: DISCONTINUED | OUTPATIENT
Start: 2021-05-13 | End: 2021-05-13 | Stop reason: HOSPADM

## 2021-05-13 RX ORDER — MIDAZOLAM HYDROCHLORIDE 1 MG/ML
INJECTION, SOLUTION INTRAMUSCULAR; INTRAVENOUS
Status: DISCONTINUED | OUTPATIENT
Start: 2021-05-13 | End: 2021-05-13

## 2021-05-13 RX ORDER — LIDOCAINE HYDROCHLORIDE 10 MG/ML
INJECTION INFILTRATION; PERINEURAL
Status: DISCONTINUED
Start: 2021-05-13 | End: 2021-05-13 | Stop reason: HOSPADM

## 2021-05-13 RX ORDER — EPHEDRINE SULFATE 50 MG/ML
INJECTION, SOLUTION INTRAVENOUS
Status: DISCONTINUED | OUTPATIENT
Start: 2021-05-13 | End: 2021-05-13

## 2021-05-13 RX ORDER — SODIUM CHLORIDE 0.9 % (FLUSH) 0.9 %
3 SYRINGE (ML) INJECTION
Status: DISCONTINUED | OUTPATIENT
Start: 2021-05-13 | End: 2021-05-13 | Stop reason: HOSPADM

## 2021-05-13 RX ORDER — LIDOCAINE HYDROCHLORIDE 10 MG/ML
1 INJECTION, SOLUTION EPIDURAL; INFILTRATION; INTRACAUDAL; PERINEURAL ONCE
Status: COMPLETED | OUTPATIENT
Start: 2021-05-13 | End: 2021-05-13

## 2021-05-13 RX ORDER — FENTANYL CITRATE 50 UG/ML
INJECTION, SOLUTION INTRAMUSCULAR; INTRAVENOUS
Status: DISCONTINUED | OUTPATIENT
Start: 2021-05-13 | End: 2021-05-13

## 2021-05-13 RX ADMIN — EPHEDRINE SULFATE 10 MG: 50 INJECTION INTRAVENOUS at 12:05

## 2021-05-13 RX ADMIN — LIDOCAINE HYDROCHLORIDE 50 MG: 20 INJECTION, SOLUTION EPIDURAL; INFILTRATION; INTRACAUDAL at 12:05

## 2021-05-13 RX ADMIN — PROPOFOL 150 MCG/KG/MIN: 10 INJECTION, EMULSION INTRAVENOUS at 12:05

## 2021-05-13 RX ADMIN — SODIUM CHLORIDE: 0.9 INJECTION, SOLUTION INTRAVENOUS at 12:05

## 2021-05-13 RX ADMIN — CEFTRIAXONE 2 G: 1 INJECTION, POWDER, FOR SOLUTION INTRAMUSCULAR; INTRAVENOUS at 12:05

## 2021-05-13 RX ADMIN — FENTANYL CITRATE 25 MCG: 50 INJECTION, SOLUTION INTRAMUSCULAR; INTRAVENOUS at 12:05

## 2021-05-13 RX ADMIN — MIDAZOLAM 2 MG: 1 INJECTION INTRAMUSCULAR; INTRAVENOUS at 12:05

## 2021-05-13 RX ADMIN — LIDOCAINE HYDROCHLORIDE 10 MG: 10 INJECTION, SOLUTION EPIDURAL; INFILTRATION; INTRACAUDAL; PERINEURAL at 10:05

## 2021-05-20 ENCOUNTER — PATIENT MESSAGE (OUTPATIENT)
Dept: UROLOGY | Facility: CLINIC | Age: 76
End: 2021-05-20

## 2021-06-08 ENCOUNTER — OFFICE VISIT (OUTPATIENT)
Dept: UROLOGY | Facility: CLINIC | Age: 76
End: 2021-06-08
Payer: MEDICARE

## 2021-06-08 VITALS
WEIGHT: 209 LBS | DIASTOLIC BLOOD PRESSURE: 65 MMHG | HEART RATE: 61 BPM | HEIGHT: 73 IN | SYSTOLIC BLOOD PRESSURE: 122 MMHG | BODY MASS INDEX: 27.7 KG/M2 | RESPIRATION RATE: 15 BRPM

## 2021-06-08 DIAGNOSIS — C61 PROSTATE CANCER: Primary | ICD-10-CM

## 2021-06-08 PROCEDURE — 99214 PR OFFICE/OUTPT VISIT, EST, LEVL IV, 30-39 MIN: ICD-10-PCS | Mod: S$PBB,,, | Performed by: UROLOGY

## 2021-06-08 PROCEDURE — 99214 OFFICE O/P EST MOD 30 MIN: CPT | Mod: S$PBB,,, | Performed by: UROLOGY

## 2021-06-08 PROCEDURE — 99214 OFFICE O/P EST MOD 30 MIN: CPT | Mod: PBBFAC | Performed by: UROLOGY

## 2021-06-08 PROCEDURE — 99999 PR PBB SHADOW E&M-EST. PATIENT-LVL IV: ICD-10-PCS | Mod: PBBFAC,,, | Performed by: UROLOGY

## 2021-06-08 PROCEDURE — 99999 PR PBB SHADOW E&M-EST. PATIENT-LVL IV: CPT | Mod: PBBFAC,,, | Performed by: UROLOGY

## 2021-06-08 RX ORDER — PNV NO.95/FERROUS FUM/FOLIC AC 28MG-0.8MG
100 TABLET ORAL DAILY
COMMUNITY
End: 2022-06-17

## 2021-06-14 RX ORDER — FLUTICASONE PROPIONATE 50 MCG
1 SPRAY, SUSPENSION (ML) NASAL DAILY
Qty: 16 G | Refills: 5 | Status: SHIPPED | OUTPATIENT
Start: 2021-06-14 | End: 2022-05-24

## 2021-07-10 ENCOUNTER — PATIENT MESSAGE (OUTPATIENT)
Dept: ADMINISTRATIVE | Facility: OTHER | Age: 76
End: 2021-07-10

## 2021-08-10 LAB
FINAL PATHOLOGIC DIAGNOSIS: NORMAL
GROSS: NORMAL
Lab: NORMAL
SUPPLEMENTAL DIAGNOSIS: NORMAL

## 2021-08-11 ENCOUNTER — LAB VISIT (OUTPATIENT)
Dept: LAB | Facility: HOSPITAL | Age: 76
End: 2021-08-11
Attending: INTERNAL MEDICINE
Payer: MEDICARE

## 2021-08-11 DIAGNOSIS — I25.10 CORONARY ARTERY DISEASE INVOLVING NATIVE CORONARY ARTERY OF NATIVE HEART WITHOUT ANGINA PECTORIS: ICD-10-CM

## 2021-08-11 DIAGNOSIS — E78.5 HYPERLIPIDEMIA, UNSPECIFIED HYPERLIPIDEMIA TYPE: ICD-10-CM

## 2021-08-11 LAB
ALBUMIN SERPL BCP-MCNC: 3.9 G/DL (ref 3.5–5.2)
ALP SERPL-CCNC: 50 U/L (ref 55–135)
ALT SERPL W/O P-5'-P-CCNC: 28 U/L (ref 10–44)
ANION GAP SERPL CALC-SCNC: 6 MMOL/L (ref 8–16)
AST SERPL-CCNC: 23 U/L (ref 10–40)
BILIRUB SERPL-MCNC: 0.9 MG/DL (ref 0.1–1)
BUN SERPL-MCNC: 7 MG/DL (ref 8–23)
CALCIUM SERPL-MCNC: 9.2 MG/DL (ref 8.7–10.5)
CHLORIDE SERPL-SCNC: 105 MMOL/L (ref 95–110)
CHOLEST SERPL-MCNC: 131 MG/DL (ref 120–199)
CHOLEST/HDLC SERPL: 3 {RATIO} (ref 2–5)
CO2 SERPL-SCNC: 30 MMOL/L (ref 23–29)
CREAT SERPL-MCNC: 1 MG/DL (ref 0.5–1.4)
EST. GFR  (AFRICAN AMERICAN): >60 ML/MIN/1.73 M^2
EST. GFR  (NON AFRICAN AMERICAN): >60 ML/MIN/1.73 M^2
GLUCOSE SERPL-MCNC: 101 MG/DL (ref 70–110)
HDLC SERPL-MCNC: 44 MG/DL (ref 40–75)
HDLC SERPL: 33.6 % (ref 20–50)
LDLC SERPL CALC-MCNC: 67.2 MG/DL (ref 63–159)
NONHDLC SERPL-MCNC: 87 MG/DL
POTASSIUM SERPL-SCNC: 4.2 MMOL/L (ref 3.5–5.1)
PROT SERPL-MCNC: 6.7 G/DL (ref 6–8.4)
SODIUM SERPL-SCNC: 141 MMOL/L (ref 136–145)
TRIGL SERPL-MCNC: 99 MG/DL (ref 30–150)

## 2021-08-11 PROCEDURE — 36415 COLL VENOUS BLD VENIPUNCTURE: CPT | Mod: PO | Performed by: INTERNAL MEDICINE

## 2021-08-11 PROCEDURE — 80053 COMPREHEN METABOLIC PANEL: CPT | Performed by: INTERNAL MEDICINE

## 2021-08-11 PROCEDURE — 80061 LIPID PANEL: CPT | Performed by: INTERNAL MEDICINE

## 2021-08-20 ENCOUNTER — OFFICE VISIT (OUTPATIENT)
Dept: RADIATION ONCOLOGY | Facility: CLINIC | Age: 76
End: 2021-08-20
Payer: MEDICARE

## 2021-08-20 VITALS
RESPIRATION RATE: 18 BRPM | HEIGHT: 73 IN | DIASTOLIC BLOOD PRESSURE: 58 MMHG | WEIGHT: 210.31 LBS | OXYGEN SATURATION: 97 % | HEART RATE: 61 BPM | SYSTOLIC BLOOD PRESSURE: 133 MMHG | TEMPERATURE: 98 F | BODY MASS INDEX: 27.87 KG/M2

## 2021-08-20 DIAGNOSIS — C61 PROSTATE CANCER: Primary | ICD-10-CM

## 2021-08-20 PROCEDURE — 99204 PR OFFICE/OUTPT VISIT, NEW, LEVL IV, 45-59 MIN: ICD-10-PCS | Mod: S$PBB,,, | Performed by: RADIOLOGY

## 2021-08-20 PROCEDURE — 99999 PR PBB SHADOW E&M-EST. PATIENT-LVL IV: ICD-10-PCS | Mod: PBBFAC,,, | Performed by: RADIOLOGY

## 2021-08-20 PROCEDURE — 99204 OFFICE O/P NEW MOD 45 MIN: CPT | Mod: S$PBB,,, | Performed by: RADIOLOGY

## 2021-08-20 PROCEDURE — 99214 OFFICE O/P EST MOD 30 MIN: CPT | Mod: PBBFAC,PN | Performed by: RADIOLOGY

## 2021-08-20 PROCEDURE — 99999 PR PBB SHADOW E&M-EST. PATIENT-LVL IV: CPT | Mod: PBBFAC,,, | Performed by: RADIOLOGY

## 2021-08-25 ENCOUNTER — OFFICE VISIT (OUTPATIENT)
Dept: CARDIOLOGY | Facility: CLINIC | Age: 76
End: 2021-08-25
Payer: MEDICARE

## 2021-08-25 VITALS
OXYGEN SATURATION: 97 % | WEIGHT: 210.13 LBS | DIASTOLIC BLOOD PRESSURE: 78 MMHG | SYSTOLIC BLOOD PRESSURE: 122 MMHG | HEART RATE: 60 BPM | BODY MASS INDEX: 27.85 KG/M2 | HEIGHT: 73 IN

## 2021-08-25 DIAGNOSIS — G47.33 OSA (OBSTRUCTIVE SLEEP APNEA): ICD-10-CM

## 2021-08-25 DIAGNOSIS — Z98.61 S/P PTCA (PERCUTANEOUS TRANSLUMINAL CORONARY ANGIOPLASTY): ICD-10-CM

## 2021-08-25 DIAGNOSIS — C61 PROSTATE CANCER: ICD-10-CM

## 2021-08-25 DIAGNOSIS — Z95.1 S/P CABG (CORONARY ARTERY BYPASS GRAFT): ICD-10-CM

## 2021-08-25 DIAGNOSIS — E78.5 HYPERLIPIDEMIA, UNSPECIFIED HYPERLIPIDEMIA TYPE: ICD-10-CM

## 2021-08-25 DIAGNOSIS — I25.10 CORONARY ARTERY DISEASE INVOLVING NATIVE CORONARY ARTERY OF NATIVE HEART WITHOUT ANGINA PECTORIS: Primary | ICD-10-CM

## 2021-08-25 DIAGNOSIS — I71.40 ABDOMINAL AORTIC ANEURYSM (AAA) WITHOUT RUPTURE: ICD-10-CM

## 2021-08-25 DIAGNOSIS — I10 ESSENTIAL HYPERTENSION: ICD-10-CM

## 2021-08-25 PROCEDURE — 99999 PR PBB SHADOW E&M-EST. PATIENT-LVL IV: ICD-10-PCS | Mod: PBBFAC,,, | Performed by: INTERNAL MEDICINE

## 2021-08-25 PROCEDURE — 99214 OFFICE O/P EST MOD 30 MIN: CPT | Mod: PBBFAC | Performed by: INTERNAL MEDICINE

## 2021-08-25 PROCEDURE — 99999 PR PBB SHADOW E&M-EST. PATIENT-LVL IV: CPT | Mod: PBBFAC,,, | Performed by: INTERNAL MEDICINE

## 2021-08-25 PROCEDURE — 99214 OFFICE O/P EST MOD 30 MIN: CPT | Mod: S$PBB,,, | Performed by: INTERNAL MEDICINE

## 2021-08-25 PROCEDURE — 99214 PR OFFICE/OUTPT VISIT, EST, LEVL IV, 30-39 MIN: ICD-10-PCS | Mod: S$PBB,,, | Performed by: INTERNAL MEDICINE

## 2021-08-25 RX ORDER — DICLOFENAC SODIUM 10 MG/G
GEL TOPICAL
COMMUNITY
Start: 2021-08-18 | End: 2022-06-17

## 2021-08-25 RX ORDER — ESCITALOPRAM OXALATE 10 MG/1
20 TABLET ORAL DAILY
COMMUNITY
Start: 2021-08-18 | End: 2023-04-24

## 2021-08-25 RX ORDER — ACETAMINOPHEN 325 MG/1
325 TABLET ORAL EVERY 6 HOURS PRN
COMMUNITY

## 2021-09-28 ENCOUNTER — PATIENT MESSAGE (OUTPATIENT)
Dept: OTHER | Facility: OTHER | Age: 76
End: 2021-09-28

## 2021-12-07 ENCOUNTER — LAB VISIT (OUTPATIENT)
Dept: LAB | Facility: HOSPITAL | Age: 76
End: 2021-12-07
Attending: UROLOGY
Payer: MEDICARE

## 2021-12-07 DIAGNOSIS — C61 PROSTATE CANCER: ICD-10-CM

## 2021-12-07 LAB — COMPLEXED PSA SERPL-MCNC: 9.8 NG/ML (ref 0–4)

## 2021-12-07 PROCEDURE — 84153 ASSAY OF PSA TOTAL: CPT | Performed by: UROLOGY

## 2021-12-07 PROCEDURE — 36415 COLL VENOUS BLD VENIPUNCTURE: CPT | Mod: PO | Performed by: UROLOGY

## 2021-12-23 ENCOUNTER — OFFICE VISIT (OUTPATIENT)
Dept: UROLOGY | Facility: CLINIC | Age: 76
End: 2021-12-23
Payer: MEDICARE

## 2021-12-23 VITALS
SYSTOLIC BLOOD PRESSURE: 134 MMHG | BODY MASS INDEX: 27.47 KG/M2 | HEIGHT: 73 IN | WEIGHT: 207.25 LBS | DIASTOLIC BLOOD PRESSURE: 62 MMHG | HEART RATE: 61 BPM

## 2021-12-23 DIAGNOSIS — C61 PROSTATE CANCER: Primary | ICD-10-CM

## 2021-12-23 PROCEDURE — 99999 PR PBB SHADOW E&M-EST. PATIENT-LVL IV: CPT | Mod: PBBFAC,,, | Performed by: UROLOGY

## 2021-12-23 PROCEDURE — 99214 OFFICE O/P EST MOD 30 MIN: CPT | Mod: PBBFAC | Performed by: UROLOGY

## 2021-12-23 PROCEDURE — 99213 PR OFFICE/OUTPT VISIT, EST, LEVL III, 20-29 MIN: ICD-10-PCS | Mod: S$PBB,,, | Performed by: UROLOGY

## 2021-12-23 PROCEDURE — 99999 PR PBB SHADOW E&M-EST. PATIENT-LVL IV: ICD-10-PCS | Mod: PBBFAC,,, | Performed by: UROLOGY

## 2021-12-23 PROCEDURE — 99213 OFFICE O/P EST LOW 20 MIN: CPT | Mod: S$PBB,,, | Performed by: UROLOGY

## 2021-12-23 RX ORDER — DUTASTERIDE 0.5 MG/1
0.5 CAPSULE, LIQUID FILLED ORAL DAILY
Qty: 30 CAPSULE | Refills: 11 | Status: SHIPPED | OUTPATIENT
Start: 2021-12-23 | End: 2022-01-21 | Stop reason: SDUPTHER

## 2022-01-06 ENCOUNTER — PATIENT MESSAGE (OUTPATIENT)
Dept: ADMINISTRATIVE | Facility: OTHER | Age: 77
End: 2022-01-06
Payer: MEDICARE

## 2022-01-17 ENCOUNTER — PATIENT MESSAGE (OUTPATIENT)
Dept: UROLOGY | Facility: CLINIC | Age: 77
End: 2022-01-17
Payer: MEDICARE

## 2022-01-20 ENCOUNTER — OFFICE VISIT (OUTPATIENT)
Dept: RADIATION ONCOLOGY | Facility: CLINIC | Age: 77
End: 2022-01-20
Payer: MEDICARE

## 2022-01-20 VITALS
TEMPERATURE: 99 F | SYSTOLIC BLOOD PRESSURE: 113 MMHG | DIASTOLIC BLOOD PRESSURE: 55 MMHG | OXYGEN SATURATION: 96 % | HEIGHT: 73 IN | HEART RATE: 53 BPM | WEIGHT: 211.88 LBS | RESPIRATION RATE: 20 BRPM | BODY MASS INDEX: 28.08 KG/M2

## 2022-01-20 DIAGNOSIS — C61 PROSTATE CANCER: Primary | ICD-10-CM

## 2022-01-20 PROCEDURE — 99999 PR PBB SHADOW E&M-EST. PATIENT-LVL V: CPT | Mod: PBBFAC,,, | Performed by: RADIOLOGY

## 2022-01-20 PROCEDURE — 99215 OFFICE O/P EST HI 40 MIN: CPT | Mod: PBBFAC,PN | Performed by: RADIOLOGY

## 2022-01-20 PROCEDURE — 99999 PR PBB SHADOW E&M-EST. PATIENT-LVL V: ICD-10-PCS | Mod: PBBFAC,,, | Performed by: RADIOLOGY

## 2022-01-20 PROCEDURE — 99215 PR OFFICE/OUTPT VISIT, EST, LEVL V, 40-54 MIN: ICD-10-PCS | Mod: S$PBB,,, | Performed by: RADIOLOGY

## 2022-01-20 PROCEDURE — 99215 OFFICE O/P EST HI 40 MIN: CPT | Mod: S$PBB,,, | Performed by: RADIOLOGY

## 2022-01-20 RX ORDER — DIAZEPAM 5 MG/1
5 TABLET ORAL EVERY 6 HOURS PRN
Qty: 3 TABLET | Refills: 0 | Status: SHIPPED | OUTPATIENT
Start: 2022-01-20 | End: 2022-06-17 | Stop reason: ALTCHOICE

## 2022-01-20 RX ORDER — SULFAMETHOXAZOLE AND TRIMETHOPRIM 800; 160 MG/1; MG/1
1 TABLET ORAL 2 TIMES DAILY
Qty: 6 TABLET | Refills: 0 | Status: SHIPPED | OUTPATIENT
Start: 2022-01-20 | End: 2022-01-23

## 2022-01-20 NOTE — PROGRESS NOTES
01/20/2022    [facility]   Radiation Oncology Follow Up    Assessment   This is a 76 y.o. y/o male with cT1c, GG 2, PSA 9.8 adenocarcinoma of the prostate, here for definitive therapy .            Plan      Treatment options were discussed with the patient including HIFU, definitive EBRT, ADT, surgery.   We discussed the goals of treatment to be curative.   The risks, benefits, scheduling, alternatives to and rationale of radiation therapy were explained in detail.     After this discussion, he elected to proceed with definitive EBRT.     Consent was obtained and all questions were answered to the best of my ability   A CT simulation will be performed following placement of SpaceOAR/fiducials to begin the planning process for the patient's radiation therapy.      He was given our contact information, and he was told that he could call our clinic at any time if he has any questions or concerns.      Radiation Treatment Details:    We plan to treat the prostate and proximal SVs to a dose of 70 Gy in 28 fractions at 2.5 Gy per fraction delivered once daily   We will utilize a(n) IMRT technique.     IMRT is medically necessary to treat complex dose painted target volumes in the prostatic fossa region with concave and convex isodose lines with steep isodose gradients to spare multiple adjacent organs at risk including the rectum, bladder, penile bulb    We will utilize daily CT or orthogonal image guidance due to the need for accurate daily patient alignment to treat the target volumes accurately and avoid radiation overdose to multiple regional organs at risk since we are treating the patient with complex target volumes with multiple steep isodose gradients.             Chief Complaint   Patient presents with    Prostate Cancer       HPI: The patient is a 76 year old male with a history of prostate cancer.  He was initially noted to have an elevated PSA in 2017, and underwent prostate  biopsies in 2019 when PSA reached 8 ng/mL.  At that time, pathology showed [place] 6 adenocarcinoma involving 7% of the specimen from the left apex.  He underwent active surveillance, and PSA ultimately reached 9.8 in 3/2021.      MRI on 3/11/21 showed a 159cc prostate with a 9 mm T2 hypointense lesion in the Lt. mid gland, PI-RADS 4 with no extraprostatic extension or change from prior MRI.  There  was the 1.5 cm PI-RADS 3 lesion in the Lt. mid gland and the 1.2 cm PI-RADS 3 lesion in the Lt. apex, both with no extraprostatic extension or change from prior study in 2020; no lymphadenopathy.     Repeat targeted biopsies 5/13/21 showed [place] 7 (3+4) adenocarcinoma involving 40% of targeted lesion in LEFT mid gland.  He presents today to discuss starting treatment.  He reports nocturia x2, urgency and frequency and incomplete emptying; denies hematuria or weak stream; taking Flomax and Avodart (started 12/2021).      Possibility of pregnancy: No  History of prior irradiation: No  History of prior systemic anti-cancer therapy: No  History of collagen vascular disease: No  Implanted electronic device (pacer/defib/nerve stimulator): No    Past Medical History:   Diagnosis Date    AAA (abdominal aortic aneurysm)     Arthritis     BPH (benign prostatic hyperplasia)     CAD (coronary artery disease)     khuri    Cataract     Colon polyps 2006    Depression     Ex-smoker     Hyperlipidemia     Hypertension     Mood disorder     chalasani    ELINA (obstructive sleep apnea)     Osteopenia 4/15 aylin 4/20    Other insomnia 1/7/2019    Prostate cancer 3/12/2020    S/P CABG (coronary artery bypass graft) 8/9/2013    S/P PTCA (percutaneous transluminal coronary angioplasty) 8/9/2013       Past Surgical History:   Procedure Laterality Date    BIOPSY WITH TRANSRECTAL ULTRASOUND (TRUS) GUIDANCE N/A 5/13/2021    Procedure: BIOPSY, WITH TRANSRECTAL US GUIDANCE/ URONAV;  Surgeon: Pawel Salinas MD;  Location: [facility]  OR 1ST FLR;  Service: Urology;  Laterality: N/A;  30 min    CATARACT EXTRACTION W/  INTRAOCULAR LENS IMPLANT Left 07/26/2016    Dr. Norman    CATARACT EXTRACTION W/  INTRAOCULAR LENS IMPLANT Right 08/09/2016    Dr. Norman    CHOLECYSTECTOMY      CORONARY ANGIOPLASTY      CORONARY ARTERY BYPASS GRAFT      HERNIA REPAIR      ptca      rca       Social History     Tobacco Use    Smoking status: Former Smoker    Smokeless tobacco: Never Used   Substance Use Topics    Alcohol use: Yes     Alcohol/week: 1.0 standard drink     Types: 1 Glasses of wine per week     Comment: maybe every 6 months    Drug use: No       Cancer-related family history is not on file.    Current Outpatient Medications on File Prior to Visit   Medication Sig Dispense Refill    acetaminophen (TYLENOL) 325 MG tablet Take 325 mg by mouth every 6 (six) hours as needed for Pain.      aspirin (ECOTRIN) 81 MG EC tablet Take 81 mg by mouth every evening. MD Sylvia Pisano, RN; P Brad DASILVA Staff  Caller: Unspecified (Today,  2:44 PM)  As per notes no need to hold ASA.   If he can stop it that is fine. Likely less hematuria after biopsy but not required      atorvastatin (LIPITOR) 80 MG tablet TAKE 1 TABLET(80 MG) BY MOUTH EVERY NIGHT (Patient taking differently: TAKE 1 TABLET(80 MG) BY MOUTH EVERY NIGHT    Ok to take) 90 tablet 3    cyanocobalamin (VITAMIN B-12) 100 MCG tablet Take 100 mcg by mouth once daily.      diclofenac sodium (VOLTAREN) 1 % Gel APPLY 4 GRAMS TOPICALLY FOUR TIMES A DAY AS NEEDED FOR PAIN AND INFLAMMATION. USE ENCLOSED DOSING CARD.      dutasteride (AVODART) 0.5 mg capsule Take 1 capsule (0.5 mg total) by mouth once daily. 30 capsule 11    EScitalopram oxalate (LEXAPRO) 10 MG tablet Take 1 tablet by mouth once daily.      fluticasone propionate (FLONASE) 50 mcg/actuation nasal spray 1 spray (50 mcg total) by Each Nostril route once daily. Shake Liquid and use 16 g 5    ibuprofen  "(ADVIL,MOTRIN) 200 MG tablet Take 200 mg by mouth as needed for Pain. Hold 1 week prior to surgery      MELATONIN ORAL Take by mouth.      metoprolol succinate (TOPROL-XL) 50 MG 24 hr tablet TAKE 1 TABLET BY MOUTH EVERY EVENING 90 tablet 3    multivit with minerals/lutein (MULTIVITAMIN 50 PLUS ORAL) Take by mouth.      tamsulosin (FLOMAX) 0.4 mg Cap Take 1 capsule (0.4 mg total) by mouth once daily. (Patient taking differently: Take 1 capsule by mouth once daily. Ok to take) 90 capsule 3     Current Facility-Administered Medications on File Prior to Visit   Medication Dose Route Frequency Provider Last Rate Last Admin    LIDOcaine HCL 10 mg/ml (1%) injection 10 mL  10 mL Infiltration Once Pawel Salinas MD        LIDOcaine HCL 10 mg/ml (1%) injection 20 mL  20 mL Other 1 time in Clinic/HOD Pawel Salinas MD        LIDOcaine HCL 2% jelly   Topical (Top) Once Pawel Salinas MD           Review of patient's allergies indicates:   Allergen Reactions    Oxycodone Other (See Comments)    Penicillins      Other reaction(s): Rash    Promethazine Other (See Comments)     paranoia    Percodan [oxycodone hcl-oxycodone-asa]      Sleep / nightmare problems         Review of Systems   Constitutional: Negative.    HENT: Negative.    Eyes: Negative.    Respiratory: Negative.    Cardiovascular: Negative.    Gastrointestinal: Negative.    Genitourinary: Positive for frequency and urgency. Negative for dysuria, flank pain and hematuria.   Musculoskeletal: Negative.    Skin: Negative.    Neurological: Negative.    Endo/Heme/Allergies: Negative.    Psychiatric/Behavioral: Negative.         Vital Signs: BP (!) 113/55 (BP Location: Left arm, Patient Position: Sitting)   Pulse (!) 53   Temp 98.8 °F (37.1 °C)   Resp 20   Ht 6' 1" (1.854 m)   Wt 96.1 kg (211 lb 13.8 oz)   SpO2 96%   BMI 27.95 kg/m²     ECOG Performance Status: 0 - Fully Active    Physical Exam  Vitals reviewed. Exam conducted with a chaperone " present.   Constitutional:       General: He is not in acute distress.     Appearance: Normal appearance. He is not ill-appearing or toxic-appearing.   HENT:      Head: Normocephalic and atraumatic.      Nose: Nose normal.      Mouth/Throat:      Mouth: Mucous membranes are moist.   Eyes:      Conjunctiva/sclera: Conjunctivae normal.      Pupils: Pupils are equal, round, and reactive to light.   Pulmonary:      Effort: Pulmonary effort is normal.   Musculoskeletal:         General: Normal range of motion.   Skin:     General: Skin is warm.   Neurological:      General: No focal deficit present.      Mental Status: He is alert and oriented to person, place, and time. Mental status is at baseline.      Motor: No weakness.      Gait: Gait normal.   Psychiatric:         Mood and Affect: Mood normal.         Behavior: Behavior normal.         Thought Content: Thought content normal.         Judgment: Judgment normal.          Labs:    Imaging: I have personally reviewed the patient's available images and reports and summarized pertinent findings above in HPI.     Pathology: I have personally reviewed the patient's available pathology and summarized pertinent findings above in HPI.           - Thank you for allowing me to participate in the care of your patient.    Rani Rodriguez MD

## 2022-01-26 ENCOUNTER — TELEPHONE (OUTPATIENT)
Dept: UROLOGY | Facility: CLINIC | Age: 77
End: 2022-01-26
Payer: MEDICARE

## 2022-01-26 DIAGNOSIS — C61 PROSTATE CANCER: Primary | ICD-10-CM

## 2022-01-26 NOTE — TELEPHONE ENCOUNTER
I left messages for patient for patient to call back to schedule his procedure w/.      ----- Message from Dora Boss RN sent at 1/26/2022 10:48 AM CST -----  Regarding: FW: Markers only  Per June req  ----- Message -----  From: Zoey Quick RN  Sent: 1/25/2022   4:34 PM CST  To: Susi Underwood MA  Subject: FW: Markers only                                 Please call me at ext [phone] when you get a chance.  Thanks!  ----- Message -----  From: Susi Underwood MA  Sent: 1/20/2022   2:17 PM CST  To: Zoey Quick RN  Subject: Markers only                                     Hi June, please let me know when we can get patient in for marker placement. Thanks!

## 2022-01-26 NOTE — TELEPHONE ENCOUNTER
----- Message from Jaja Mcclelland LPN sent at 1/25/2022  2:37 PM CST -----  Regarding: RE: markers and S-OARS prior to XRT  This is a patient of dr. Salinas, I have forwarded it to his staff, thanks   ----- Message -----  From: Miranda Hernandez RN  Sent: 1/25/2022  12:46 PM CST  To: Elgin REYES Staff  Subject: markers and S-OARS prior to XRT                  Hi, we have two patients who need markers and S-Oars prior to XRT - current patient and also Stephanie MRN [id number].  We will do their simulation the same or next day in [place].  Thank you!

## 2022-01-26 NOTE — TELEPHONE ENCOUNTER
----- Message from Dora Boss RN sent at 1/26/2022 10:48 AM CST -----  Regarding: FW: Markers only  Per June req  ----- Message -----  From: Zoey Quick RN  Sent: 1/25/2022   4:34 PM CST  To: Susi Underwood MA  Subject: FW: Markers only                                 Please call me at ext [phone] when you get a chance.  Thanks!  ----- Message -----  From: Susi Underwood MA  Sent: 1/20/2022   2:17 PM CST  To: Zoey Quick RN  Subject: Markers only                                     Hi June, please let me know when we can get patient in for marker placement. Thanks!

## 2022-01-27 ENCOUNTER — TELEPHONE (OUTPATIENT)
Dept: RADIATION ONCOLOGY | Facility: CLINIC | Age: 77
End: 2022-01-27
Payer: MEDICARE

## 2022-01-31 ENCOUNTER — PATIENT MESSAGE (OUTPATIENT)
Dept: OTHER | Facility: OTHER | Age: 77
End: 2022-01-31
Payer: MEDICARE

## 2022-02-01 ENCOUNTER — DOCUMENTATION ONLY (OUTPATIENT)
Dept: INFUSION THERAPY | Facility: HOSPITAL | Age: 77
End: 2022-02-01
Payer: MEDICARE

## 2022-02-01 ENCOUNTER — HOSPITAL ENCOUNTER (OUTPATIENT)
Dept: RADIATION THERAPY | Facility: HOSPITAL | Age: 77
Discharge: HOME | End: 2022-02-01
Attending: RADIOLOGY
Payer: MEDICARE

## 2022-02-01 ENCOUNTER — PROCEDURE VISIT (OUTPATIENT)
Dept: UROLOGY | Facility: CLINIC | Age: 77
End: 2022-02-01
Payer: MEDICARE

## 2022-02-01 VITALS
TEMPERATURE: 98 F | RESPIRATION RATE: 18 BRPM | HEART RATE: 61 BPM | SYSTOLIC BLOOD PRESSURE: 132 MMHG | BODY MASS INDEX: 27.46 KG/M2 | WEIGHT: 207.19 LBS | DIASTOLIC BLOOD PRESSURE: 73 MMHG | HEIGHT: 73 IN

## 2022-02-01 DIAGNOSIS — C61 PROSTATE CANCER: ICD-10-CM

## 2022-02-01 PROCEDURE — 55876 TRANSRECTAL ULTRASOUND WITH MARKERS AND SPACEOAR: ICD-10-PCS | Mod: S$PBB,,, | Performed by: UROLOGY

## 2022-02-01 PROCEDURE — 55876 PLACE RT DEVICE/MARKER PROS: CPT | Mod: S$PBB,,, | Performed by: UROLOGY

## 2022-02-01 PROCEDURE — 77334 RADIATION TREATMENT AID(S): CPT | Mod: 26,,, | Performed by: RADIOLOGY

## 2022-02-01 PROCEDURE — 77014 PR  CT GUIDANCE PLACEMENT RAD THERAPY FIELDS: CPT | Mod: 26,,, | Performed by: RADIOLOGY

## 2022-02-01 PROCEDURE — 77014 HC CT GUIDANCE RADIATION THERAPY FLDS PLACEMENT: CPT | Mod: TC,PN | Performed by: RADIOLOGY

## 2022-02-01 PROCEDURE — 77334 PR  RADN TREATMENT AID(S) COMPLX: ICD-10-PCS | Mod: 26,,, | Performed by: RADIOLOGY

## 2022-02-01 PROCEDURE — A4648 IMPLANTABLE TISSUE MARKER: HCPCS | Mod: PBBFAC | Performed by: UROLOGY

## 2022-02-01 PROCEDURE — 77014 PR  CT GUIDANCE PLACEMENT RAD THERAPY FIELDS: ICD-10-PCS | Mod: 26,,, | Performed by: RADIOLOGY

## 2022-02-01 PROCEDURE — 76942 TRANSRECTAL ULTRASOUND WITH MARKERS AND SPACEOAR: ICD-10-PCS | Mod: 26,S$PBB,, | Performed by: UROLOGY

## 2022-02-01 PROCEDURE — 76942 ECHO GUIDE FOR BIOPSY: CPT | Mod: 26,S$PBB,, | Performed by: UROLOGY

## 2022-02-01 PROCEDURE — 77334 RADIATION TREATMENT AID(S): CPT | Mod: TC,PN | Performed by: RADIOLOGY

## 2022-02-01 RX ORDER — LIDOCAINE HYDROCHLORIDE 20 MG/ML
JELLY TOPICAL
Status: COMPLETED | OUTPATIENT
Start: 2022-02-01 | End: 2022-02-01

## 2022-02-01 RX ADMIN — LIDOCAINE HYDROCHLORIDE 20 ML: 10 INJECTION, SOLUTION INFILTRATION; PERINEURAL at 08:02

## 2022-02-01 RX ADMIN — LIDOCAINE HYDROCHLORIDE: 20 JELLY TOPICAL at 08:02

## 2022-02-01 NOTE — PROCEDURES
"Transrectal Ultrasound with Markers and SpaceOAR    Date/Time: 2/1/2022 8:00 AM  Performed by: Pawel Salinas MD  Authorized by: Donald Jovel Jr., MD     Consent Done?:  Yes (Written)  Time out: Immediately prior to procedure a "time out" was called to verify the correct patient, procedure, equipment, support staff and site/side marked as required.    History of Prostate Cancer: Yes    Position:  Lateral  Anesthesia:  20 cc 1% Lidocaine  Preparation: Patient was prepped and draped in usual sterile fashion    Procedure Details: 1cm Gold seed placed at right base and second seed at left apex    Patient Tolerance:  Patient tolerated the procedure well with no immediate complications      "

## 2022-02-01 NOTE — PATIENT INSTRUCTIONS
What to Expect After a TRUS (Transrectal Ultrasound) with Fiducial Marker Placement    Please be sure to finish your pre-procedure antibiotics as instructed.    You may have mild bleeding from the rectum or urine from about 1 week to 1 month, or in your ejaculate for several months. This bleeding is normal and expected, and it will stop. You may have mild discomfort in your rectal or urethral area for 24-48 hours.    You cannot do any strenuous lifting, straining, or exercising for 24 hours. You may return to full activity the day after the procedure.    You may continue to take all your regular medications after the procedure except for the blood thinners.    You may resume all blood-thinning medications once you no longer see any bleeding or whenever your physician prescribing the medication says it is all right to do so. You may take Tylenol if you have a fever and your temperature is less than 100° F or if you have some discomfort.    Signs and Symptoms to Report    Call your [facility] urologist at [phone] if you develop any of the following:  · Temperature greater than 101° F  · Inability to urinate  · A large amount of bleeding from the rectum or in the urine  · Persistent or severe pain    After hours or on weekends, you may reach a urology resident on call at this number: [phone].

## 2022-02-01 NOTE — PROGRESS NOTES
Oncology Nutrition Education    Nutrition Education   This is a 76 y.o.male with a medical diagnosis of prostate cancer.     Met w/ pt to discuss current nutritional status and nutrition as it relates to cancer and cancer treatment. Pt currently low nutrition risk. RD introduced self and explained role in POC, such as dealing with possible nutrition related side effects.    Wt Readings from Last 10 Encounters:   02/01/22 94 kg (207 lb 3.2 oz)   01/20/22 96.1 kg (211 lb 13.8 oz)   12/23/21 94 kg (207 lb 3.7 oz)   08/25/21 95.3 kg (210 lb 1.6 oz)   08/20/21 95.4 kg (210 lb 5.1 oz)   06/08/21 94.8 kg (209 lb)   05/13/21 93.8 kg (206 lb 12.7 oz)   04/06/21 93.8 kg (206 lb 11.2 oz)   03/18/21 94.6 kg (208 lb 8.9 oz)   02/24/21 93.5 kg (206 lb 2.1 oz)        [x] PMHx reviewed  [x] Labs reviewed    Encouraged adequate calories, protein and hydrating fluids. Encouraged weight maintenance at this time.     Answered all nutrition related questions.     Patient provided with dietitian contact number and advised to call with questions or make future appointment if further intervention is needed. RD to follow throughout tx.    Nydia Marx, MS, RD, LDN  02/01/2022  3:15 PM

## 2022-02-03 DIAGNOSIS — C61 PROSTATE CANCER: Primary | ICD-10-CM

## 2022-02-03 NOTE — PROGRESS NOTES
Discussed with Dr. Rodriguez. 150 cc prostate with significant LUTS on flomax and finasteride. Will give single dose Lupron prior to EBRT.

## 2022-02-10 ENCOUNTER — CLINICAL SUPPORT (OUTPATIENT)
Dept: UROLOGY | Facility: CLINIC | Age: 77
End: 2022-02-10
Payer: MEDICARE

## 2022-02-10 PROCEDURE — 96402 CHEMO HORMON ANTINEOPL SQ/IM: CPT | Mod: PBBFAC

## 2022-02-10 RX ADMIN — LEUPROLIDE ACETATE 45 MG: KIT at 09:02

## 2022-02-25 ENCOUNTER — LAB VISIT (OUTPATIENT)
Dept: LAB | Facility: HOSPITAL | Age: 77
End: 2022-02-25
Attending: INTERNAL MEDICINE
Payer: MEDICARE

## 2022-02-25 DIAGNOSIS — E78.5 HYPERLIPIDEMIA, UNSPECIFIED HYPERLIPIDEMIA TYPE: ICD-10-CM

## 2022-02-25 LAB
ALBUMIN SERPL BCP-MCNC: 3.7 G/DL (ref 3.5–5.2)
ALP SERPL-CCNC: 49 U/L (ref 55–135)
ALT SERPL W/O P-5'-P-CCNC: 23 U/L (ref 10–44)
ANION GAP SERPL CALC-SCNC: 10 MMOL/L (ref 8–16)
AST SERPL-CCNC: 21 U/L (ref 10–40)
BILIRUB SERPL-MCNC: 1.1 MG/DL (ref 0.1–1)
BUN SERPL-MCNC: 8 MG/DL (ref 8–23)
CALCIUM SERPL-MCNC: 9 MG/DL (ref 8.7–10.5)
CHLORIDE SERPL-SCNC: 103 MMOL/L (ref 95–110)
CHOLEST SERPL-MCNC: 142 MG/DL (ref 120–199)
CHOLEST/HDLC SERPL: 3 {RATIO} (ref 2–5)
CO2 SERPL-SCNC: 28 MMOL/L (ref 23–29)
CREAT SERPL-MCNC: 0.9 MG/DL (ref 0.5–1.4)
EST. GFR  (AFRICAN AMERICAN): >60 ML/MIN/1.73 M^2
EST. GFR  (NON AFRICAN AMERICAN): >60 ML/MIN/1.73 M^2
GLUCOSE SERPL-MCNC: 93 MG/DL (ref 70–110)
HDLC SERPL-MCNC: 47 MG/DL (ref 40–75)
HDLC SERPL: 33.1 % (ref 20–50)
LDLC SERPL CALC-MCNC: 70.8 MG/DL (ref 63–159)
NONHDLC SERPL-MCNC: 95 MG/DL
POTASSIUM SERPL-SCNC: 4.3 MMOL/L (ref 3.5–5.1)
PROT SERPL-MCNC: 6.4 G/DL (ref 6–8.4)
SODIUM SERPL-SCNC: 141 MMOL/L (ref 136–145)
TRIGL SERPL-MCNC: 121 MG/DL (ref 30–150)

## 2022-02-25 PROCEDURE — 80061 LIPID PANEL: CPT | Performed by: INTERNAL MEDICINE

## 2022-02-25 PROCEDURE — 80053 COMPREHEN METABOLIC PANEL: CPT | Performed by: INTERNAL MEDICINE

## 2022-02-25 PROCEDURE — 36415 COLL VENOUS BLD VENIPUNCTURE: CPT | Mod: PO | Performed by: INTERNAL MEDICINE

## 2022-03-02 ENCOUNTER — OFFICE VISIT (OUTPATIENT)
Dept: CARDIOLOGY | Facility: CLINIC | Age: 77
End: 2022-03-02
Payer: MEDICARE

## 2022-03-02 VITALS
HEART RATE: 61 BPM | HEIGHT: 73 IN | SYSTOLIC BLOOD PRESSURE: 112 MMHG | BODY MASS INDEX: 28.22 KG/M2 | DIASTOLIC BLOOD PRESSURE: 68 MMHG | OXYGEN SATURATION: 97 % | WEIGHT: 212.94 LBS

## 2022-03-02 DIAGNOSIS — Z98.61 S/P PTCA (PERCUTANEOUS TRANSLUMINAL CORONARY ANGIOPLASTY): ICD-10-CM

## 2022-03-02 DIAGNOSIS — I25.10 CORONARY ARTERY DISEASE INVOLVING NATIVE CORONARY ARTERY OF NATIVE HEART WITHOUT ANGINA PECTORIS: Primary | ICD-10-CM

## 2022-03-02 DIAGNOSIS — G47.33 OSA (OBSTRUCTIVE SLEEP APNEA): ICD-10-CM

## 2022-03-02 DIAGNOSIS — Z95.1 S/P CABG (CORONARY ARTERY BYPASS GRAFT): ICD-10-CM

## 2022-03-02 DIAGNOSIS — E78.5 HYPERLIPIDEMIA, UNSPECIFIED HYPERLIPIDEMIA TYPE: ICD-10-CM

## 2022-03-02 DIAGNOSIS — I71.40 ABDOMINAL AORTIC ANEURYSM (AAA) WITHOUT RUPTURE: ICD-10-CM

## 2022-03-02 DIAGNOSIS — I10 PRIMARY HYPERTENSION: ICD-10-CM

## 2022-03-02 PROCEDURE — 99999 PR PBB SHADOW E&M-EST. PATIENT-LVL IV: ICD-10-PCS | Mod: PBBFAC,,, | Performed by: INTERNAL MEDICINE

## 2022-03-02 PROCEDURE — 99214 OFFICE O/P EST MOD 30 MIN: CPT | Mod: S$PBB,,, | Performed by: INTERNAL MEDICINE

## 2022-03-02 PROCEDURE — 99214 PR OFFICE/OUTPT VISIT, EST, LEVL IV, 30-39 MIN: ICD-10-PCS | Mod: S$PBB,,, | Performed by: INTERNAL MEDICINE

## 2022-03-02 PROCEDURE — 99999 PR PBB SHADOW E&M-EST. PATIENT-LVL IV: CPT | Mod: PBBFAC,,, | Performed by: INTERNAL MEDICINE

## 2022-03-02 PROCEDURE — 99214 OFFICE O/P EST MOD 30 MIN: CPT | Mod: PBBFAC | Performed by: INTERNAL MEDICINE

## 2022-03-02 NOTE — PROGRESS NOTES
Subjective:   Patient ID:  Peter Caldwell is a 76 y.o. male who presents for follow up of No chief complaint on file.      HPI  8/14/2020  A 75 yo male with cad s/p cabg htn hlp is here for f/u cad. He has no new complaints cardiac wise compliant with diet works in the yard some exercise asymptomatic cardiovascular wise. He has aaa stable by u/s eval in December 2019 has no abdominal back or claudication or foot discoloration ha recurrent hernia some pain.   2/24/2021  Here for f/u has been compliant exercising compliant with diet meds. Has no new complaints had some chest  wall pain due to sprain .has lost weight no angina chf tia claudication.      8/25/2021  Here for f/u exercises regularily weight stretching bike. Has no symptoms of angina no chf . He is compliant with meds needs to improve diet ha sno bleeding no tia no claudication weight has been stable.     3/2/2022  Has increased psa biopsy done radiation planned not done yet on 6 month f/u for now has been on lexapro . Has no other complaints of chest pain shortness of breath does daily exercise stretching weight routine bike. Mild weight increase. His lipids are on target. He is on digital medicine htn readings all look good.   Past Medical History:   Diagnosis Date    AAA (abdominal aortic aneurysm)     Arthritis     BPH (benign prostatic hyperplasia)     CAD (coronary artery disease)     khuri    Cataract     Colon polyps 2006    Depression     Ex-smoker     Hyperlipidemia     Hypertension     Mood disorder     chalasani    ELINA (obstructive sleep apnea)     Osteopenia 4/15 aylin 4/20    Other insomnia 1/7/2019    Prostate cancer 3/12/2020    S/P CABG (coronary artery bypass graft) 8/9/2013    S/P PTCA (percutaneous transluminal coronary angioplasty) 8/9/2013       Past Surgical History:   Procedure Laterality Date    BIOPSY WITH TRANSRECTAL ULTRASOUND (TRUS) GUIDANCE N/A 5/13/2021    Procedure: BIOPSY, WITH TRANSRECTAL US GUIDANCE/  URONAV;  Surgeon: Pawel Salinas MD;  Location: [facility] OR [address] [place] [address];  Service: Urology;  Laterality: N/A;  30 min    CATARACT EXTRACTION W/  INTRAOCULAR LENS IMPLANT Left 07/26/2016    Dr. Norman    CATARACT EXTRACTION W/  INTRAOCULAR LENS IMPLANT Right 08/09/2016    Dr. Norman    CHOLECYSTECTOMY      CORONARY ANGIOPLASTY      CORONARY ARTERY BYPASS GRAFT      HERNIA REPAIR      ptca      rca       Social History     Tobacco Use    Smoking status: Former Smoker    Smokeless tobacco: Never Used   Substance Use Topics    Alcohol use: Yes     Alcohol/week: 1.0 standard drink     Types: 1 Glasses of wine per week     Comment: maybe every 6 months    Drug use: No       Family History   Problem Relation Age of Onset    Coronary artery disease Mother     Diabetes Mother     Heart disease Mother     Coronary artery disease Father     Heart disease Father     Heart disease Brother     Heart disease Brother     Amblyopia Neg Hx     Blindness Neg Hx     Cataracts Neg Hx     Glaucoma Neg Hx     Macular degeneration Neg Hx     Retinal detachment Neg Hx     Strabismus Neg Hx        Current Outpatient Medications   Medication Sig    acetaminophen (TYLENOL) 325 MG tablet Take 325 mg by mouth every 6 (six) hours as needed for Pain.    aspirin (ECOTRIN) 81 MG EC tablet Take 81 mg by mouth every evening. MD Sylvia Pisano RN; P Brad DASILVA Staff  Caller: Unspecified (Today,  2:44 PM)  As per notes no need to hold ASA.   If he can stop it that is fine. Likely less hematuria after biopsy but not required    atorvastatin (LIPITOR) 80 MG tablet TAKE 1 TABLET(80 MG) BY MOUTH EVERY NIGHT (Patient taking differently: TAKE 1 TABLET(80 MG) BY MOUTH EVERY NIGHT    Ok to take)    diclofenac sodium (VOLTAREN) 1 % Gel APPLY 4 GRAMS TOPICALLY FOUR TIMES A DAY AS NEEDED FOR PAIN AND INFLAMMATION. USE ENCLOSED DOSING CARD.    dutasteride (AVODART) 0.5 mg capsule Take 1 capsule (0.5 mg  total) by mouth once daily.    EScitalopram oxalate (LEXAPRO) 10 MG tablet Take 1 tablet by mouth once daily.    fluticasone propionate (FLONASE) 50 mcg/actuation nasal spray 1 spray (50 mcg total) by Each Nostril route once daily. Shake Liquid and use    ibuprofen (ADVIL,MOTRIN) 200 MG tablet Take 200 mg by mouth as needed for Pain. Hold 1 week prior to surgery    MELATONIN ORAL Take by mouth.    metoprolol succinate (TOPROL-XL) 50 MG 24 hr tablet TAKE 1 TABLET BY MOUTH EVERY EVENING    multivit with minerals/lutein (MULTIVITAMIN 50 PLUS ORAL) Take by mouth.    tamsulosin (FLOMAX) 0.4 mg Cap Take 1 capsule (0.4 mg total) by mouth once daily. (Patient taking differently: Take 1 capsule by mouth once daily. Ok to take)    cyanocobalamin (VITAMIN B-12) 100 MCG tablet Take 100 mcg by mouth once daily.    diazePAM (VALIUM) 5 MG tablet Take 1 tablet (5 mg total) by mouth every 6 (six) hours as needed for Anxiety. Take all 3 tablets 1 hour prior to procedure     Current Facility-Administered Medications   Medication    LIDOcaine HCL 10 mg/ml (1%) injection 10 mL    LIDOcaine HCL 2% jelly     Current Outpatient Medications on File Prior to Visit   Medication Sig    acetaminophen (TYLENOL) 325 MG tablet Take 325 mg by mouth every 6 (six) hours as needed for Pain.    aspirin (ECOTRIN) 81 MG EC tablet Take 81 mg by mouth every evening. MD Sylvia Pisano RN; P Brad DASILVA Staff  Caller: Unspecified (Today,  2:44 PM)  As per notes no need to hold ASA.   If he can stop it that is fine. Likely less hematuria after biopsy but not required    atorvastatin (LIPITOR) 80 MG tablet TAKE 1 TABLET(80 MG) BY MOUTH EVERY NIGHT (Patient taking differently: TAKE 1 TABLET(80 MG) BY MOUTH EVERY NIGHT    Ok to take)    diclofenac sodium (VOLTAREN) 1 % Gel APPLY 4 GRAMS TOPICALLY FOUR TIMES A DAY AS NEEDED FOR PAIN AND INFLAMMATION. USE ENCLOSED DOSING CARD.    dutasteride (AVODART) 0.5 mg capsule  Take 1 capsule (0.5 mg total) by mouth once daily.    EScitalopram oxalate (LEXAPRO) 10 MG tablet Take 1 tablet by mouth once daily.    fluticasone propionate (FLONASE) 50 mcg/actuation nasal spray 1 spray (50 mcg total) by Each Nostril route once daily. Shake Liquid and use    ibuprofen (ADVIL,MOTRIN) 200 MG tablet Take 200 mg by mouth as needed for Pain. Hold 1 week prior to surgery    MELATONIN ORAL Take by mouth.    metoprolol succinate (TOPROL-XL) 50 MG 24 hr tablet TAKE 1 TABLET BY MOUTH EVERY EVENING    multivit with minerals/lutein (MULTIVITAMIN 50 PLUS ORAL) Take by mouth.    tamsulosin (FLOMAX) 0.4 mg Cap Take 1 capsule (0.4 mg total) by mouth once daily. (Patient taking differently: Take 1 capsule by mouth once daily. Ok to take)    cyanocobalamin (VITAMIN B-12) 100 MCG tablet Take 100 mcg by mouth once daily.    diazePAM (VALIUM) 5 MG tablet Take 1 tablet (5 mg total) by mouth every 6 (six) hours as needed for Anxiety. Take all 3 tablets 1 hour prior to procedure     Current Facility-Administered Medications on File Prior to Visit   Medication    LIDOcaine HCL 10 mg/ml (1%) injection 10 mL    LIDOcaine HCL 2% jelly     Review of patient's allergies indicates:   Allergen Reactions    Oxycodone Other (See Comments)    Penicillins      Other reaction(s): Rash    Promethazine Other (See Comments)     paranoia    Percodan [oxycodone hcl-oxycodone-asa]      Sleep / nightmare problems       Review of Systems   Constitutional: Negative for malaise/fatigue.   Eyes: Negative for blurred vision.   Cardiovascular: Negative for chest pain, claudication, cyanosis, dyspnea on exertion, irregular heartbeat, leg swelling, near-syncope, orthopnea, palpitations and paroxysmal nocturnal dyspnea.   Respiratory: Negative for cough, hemoptysis and shortness of breath.    Hematologic/Lymphatic: Negative for bleeding problem. Does not bruise/bleed easily.   Skin: Negative for dry skin and itching.    Musculoskeletal: Negative for falls, muscle weakness and myalgias.   Gastrointestinal: Negative for abdominal pain, diarrhea, heartburn, hematemesis, hematochezia and melena.   Genitourinary: Negative for flank pain and hematuria.   Neurological: Negative for dizziness, focal weakness, headaches, light-headedness, numbness, paresthesias, seizures and weakness.   Psychiatric/Behavioral: Negative for altered mental status and memory loss. The patient is nervous/anxious.    Allergic/Immunologic: Negative for hives.       Objective:   Physical Exam  Vitals and nursing note reviewed.   Constitutional:       General: He is not in acute distress.     Appearance: He is well-developed. He is not diaphoretic.   HENT:      Head: Normocephalic and atraumatic.   Eyes:      General:         Right eye: No discharge.         Left eye: No discharge.      Pupils: Pupils are equal, round, and reactive to light.   Neck:      Thyroid: No thyromegaly.      Vascular: No carotid bruit or JVD.   Cardiovascular:      Rate and Rhythm: Normal rate and regular rhythm.      Pulses: Normal pulses and intact distal pulses.      Heart sounds: Normal heart sounds. No murmur heard.    No friction rub. No gallop.   Pulmonary:      Effort: Pulmonary effort is normal. No respiratory distress.      Breath sounds: Normal breath sounds. No wheezing or rales.      Comments: Scar cabg well healed.   Chest:      Chest wall: No tenderness.   Abdominal:      General: Bowel sounds are normal. There is no distension.      Palpations: Abdomen is soft.      Tenderness: There is no abdominal tenderness.   Musculoskeletal:         General: Normal range of motion.      Cervical back: Neck supple.      Right lower leg: No edema.      Left lower leg: No edema.   Skin:     General: Skin is warm and dry.      Findings: No erythema or rash.   Neurological:      Mental Status: He is alert and oriented to person, place, and time.      Cranial Nerves: No cranial nerve  "deficit.   Psychiatric:         Behavior: Behavior normal.         Judgment: Judgment normal.       Vitals:    03/02/22 1253 03/02/22 1259   BP: 120/60 112/68   BP Location: Right arm Left arm   Patient Position: Sitting Sitting   BP Method: Large (Manual) Large (Manual)   Pulse: 61    SpO2: 97%    Weight: 96.6 kg (212 lb 15.4 oz)    Height: 6' 1" (1.854 m)      Lab Results   Component Value Date    CHOL 142 02/25/2022    CHOL 131 08/11/2021    CHOL 133 02/03/2021     Lab Results   Component Value Date    HDL 47 02/25/2022    HDL 44 08/11/2021    HDL 44 02/03/2021     Lab Results   Component Value Date    LDLCALC 70.8 02/25/2022    LDLCALC 67.2 08/11/2021    LDLCALC 69.6 02/03/2021     Lab Results   Component Value Date    TRIG 121 02/25/2022    TRIG 99 08/11/2021    TRIG 97 02/03/2021     Lab Results   Component Value Date    CHOLHDL 33.1 02/25/2022    CHOLHDL 33.6 08/11/2021    CHOLHDL 33.1 02/03/2021       Chemistry        Component Value Date/Time     02/25/2022 1229    K 4.3 02/25/2022 1229     02/25/2022 1229    CO2 28 02/25/2022 1229    BUN 8 02/25/2022 1229    CREATININE 0.9 02/25/2022 1229    GLU 93 02/25/2022 1229        Component Value Date/Time    CALCIUM 9.0 02/25/2022 1229    ALKPHOS 49 (L) 02/25/2022 1229    AST 21 02/25/2022 1229    ALT 23 02/25/2022 1229    BILITOT 1.1 (H) 02/25/2022 1229    ESTGFRAFRICA >60.0 02/25/2022 1229    EGFRNONAA >60.0 02/25/2022 1229          Lab Results   Component Value Date    TSH 2.1 05/13/2005     Lab Results   Component Value Date    INR 1.0 06/02/2009     Lab Results   Component Value Date    WBC 6.53 05/10/2021    HGB 15.9 05/10/2021    HCT 46.9 05/10/2021    MCV 95 05/10/2021     05/10/2021     BMP  Sodium   Date Value Ref Range Status   02/25/2022 141 136 - 145 mmol/L Final     Potassium   Date Value Ref Range Status   02/25/2022 4.3 3.5 - 5.1 mmol/L Final     Chloride   Date Value Ref Range Status   02/25/2022 103 95 - 110 mmol/L Final     CO2 "   Date Value Ref Range Status   02/25/2022 28 23 - 29 mmol/L Final     BUN   Date Value Ref Range Status   02/25/2022 8 8 - 23 mg/dL Final     Creatinine   Date Value Ref Range Status   02/25/2022 0.9 0.5 - 1.4 mg/dL Final     Calcium   Date Value Ref Range Status   02/25/2022 9.0 8.7 - 10.5 mg/dL Final     Anion Gap   Date Value Ref Range Status   02/25/2022 10 8 - 16 mmol/L Final     eGFR if    Date Value Ref Range Status   02/25/2022 >60.0 >60 mL/min/1.73 m^2 Final     eGFR if non    Date Value Ref Range Status   02/25/2022 >60.0 >60 mL/min/1.73 m^2 Final     Comment:     Calculation used to obtain the estimated glomerular filtration  rate (eGFR) is the CKD-EPI equation.        Estimated Creatinine Clearance: 85.5 mL/min (based on SCr of 0.9 mg/dL).    Assessment:     1. Coronary artery disease involving native coronary artery of native heart without angina pectoris    2. Primary hypertension    3. ELINA (obstructive sleep apnea)    4. Hyperlipidemia, unspecified hyperlipidemia type    5. S/P CABG (coronary artery bypass graft)    6. S/P PTCA (percutaneous transluminal coronary angioplasty)    7. Abdominal aortic aneurysm (AAA) without rupture    cad s/p cabg asymptomatic good exercise tolerance counseled about compliance.   htn controlled on digital htn program compliant.   hlp tolerating meds well on target counseled about weight loss. Exercise.   aaa stable continue risk factor modification.      Plan:   Continue current therapy  Cardiac low salt diet.  Risk factor modification and excercise program./weight loss  F/u in 6 months with lipid cmp

## 2022-03-07 ENCOUNTER — PATIENT MESSAGE (OUTPATIENT)
Dept: OTHER | Facility: OTHER | Age: 77
End: 2022-03-07
Payer: MEDICARE

## 2022-03-10 ENCOUNTER — TELEPHONE (OUTPATIENT)
Dept: INFUSION THERAPY | Facility: HOSPITAL | Age: 77
End: 2022-03-10
Payer: MEDICARE

## 2022-03-18 ENCOUNTER — PATIENT MESSAGE (OUTPATIENT)
Dept: OTHER | Facility: OTHER | Age: 77
End: 2022-03-18
Payer: MEDICARE

## 2022-03-30 ENCOUNTER — PATIENT MESSAGE (OUTPATIENT)
Dept: UROLOGY | Facility: CLINIC | Age: 77
End: 2022-03-30
Payer: MEDICARE

## 2022-03-31 ENCOUNTER — TELEPHONE (OUTPATIENT)
Dept: INFUSION THERAPY | Facility: HOSPITAL | Age: 77
End: 2022-03-31
Payer: MEDICARE

## 2022-04-01 ENCOUNTER — TELEPHONE (OUTPATIENT)
Dept: INFUSION THERAPY | Facility: HOSPITAL | Age: 77
End: 2022-04-01
Payer: MEDICARE

## 2022-04-01 ENCOUNTER — PATIENT MESSAGE (OUTPATIENT)
Dept: OTHER | Facility: OTHER | Age: 77
End: 2022-04-01
Payer: MEDICARE

## 2022-05-02 ENCOUNTER — OFFICE VISIT (OUTPATIENT)
Dept: PSYCHIATRY | Facility: CLINIC | Age: 77
End: 2022-05-02
Payer: MEDICARE

## 2022-05-02 DIAGNOSIS — F43.22 ADJUSTMENT DISORDER WITH ANXIETY: Primary | ICD-10-CM

## 2022-05-02 PROBLEM — G47.09 OTHER INSOMNIA: Status: RESOLVED | Noted: 2019-01-07 | Resolved: 2022-05-02

## 2022-05-02 PROCEDURE — 99999 PR PBB SHADOW E&M-EST. PATIENT-LVL II: CPT | Mod: PBBFAC,,, | Performed by: PSYCHOLOGIST

## 2022-05-02 PROCEDURE — 99212 OFFICE O/P EST SF 10 MIN: CPT | Mod: PBBFAC | Performed by: PSYCHOLOGIST

## 2022-05-02 PROCEDURE — 99999 PR PBB SHADOW E&M-EST. PATIENT-LVL II: ICD-10-PCS | Mod: PBBFAC,,, | Performed by: PSYCHOLOGIST

## 2022-05-02 PROCEDURE — 90791 PR PSYCHIATRIC DIAGNOSTIC EVALUATION: ICD-10-PCS | Mod: ,,, | Performed by: PSYCHOLOGIST

## 2022-05-02 PROCEDURE — 90791 PSYCH DIAGNOSTIC EVALUATION: CPT | Mod: ,,, | Performed by: PSYCHOLOGIST

## 2022-05-02 NOTE — PROGRESS NOTES
"PSYCHO-ONCOLOGY INTAKE    Diagnostic Interview - CPT 50270    Date: 5/2/2022  Site: [facility]     Evaluation Length (direct face-to-face time):  1.5 hours     Referral Source: Pawel Salinas MD   Radiation Oncologist: Michael   PCP: Rony Cardoso MD    Clinical status of patient: Outpatient    Peter Caldwell, a 76 y.o. male, seen for initial evaluation visit.  Met with patient.    Chief complaint/reason for encounter: adjustment to illness, depression, anxiety and sleep    Medical/Surgical History:    Patient Active Problem List   Diagnosis    Hypertension    CAD (coronary artery disease)    Hyperlipidemia    BPH (benign prostatic hyperplasia)    S/P CABG (coronary artery bypass graft)    S/P PTCA (percutaneous transluminal coronary angioplasty)    Osteopenia    AAA (abdominal aortic aneurysm)    Nuclear sclerosis of both eyes    Vitreous detachment of both eyes    Refractive error    Senile nuclear sclerosis    Post-operative state    Nuclear sclerosis of right eye    Short-term memory loss    Decreased hearing of both ears    Right hip pain    Situational depression    PCO (posterior capsular opacification), bilateral    Pseudophakia    Prostate cancer    PCO (posterior capsular opacification), right    Adjustment disorder with anxiety       Health Behaviors:       ETOH Use: Yes (rare and social)       Tobacco Use: No   Illicit Drug Use:  No     Prescription Misuse:No   Caffeine: moderate   Exercise:The patient maintains a regular, healthy exercise program. Also does "heavy" yardwork.    Family History:   Psychiatric illness: Yes  (Brother- schizophrenia, mother depression)   Alcohol/Drug Abuse: No     Suicide: No      Past Psychiatric History:   Inpatient treatment: No     [facility]    Prior substance abuse treatment: No     Suicide Attempts: No     Psychotropic Medications:  Current: Lexapro  (unclear if " "benefit)       Past: Valium  (briefly 20+ years ago, stopped it due to perceived side effect, but rash was later found to be unrelated)   Current medications as per below, allergies reviewed in chart.    Current Outpatient Medications   Medication    acetaminophen (TYLENOL) 325 MG tablet    aspirin (ECOTRIN) 81 MG EC tablet    atorvastatin (LIPITOR) 80 MG tablet    cyanocobalamin (VITAMIN B-12) 100 MCG tablet    diazePAM (VALIUM) 5 MG tablet    diclofenac sodium (VOLTAREN) 1 % Gel    dutasteride (AVODART) 0.5 mg capsule    EScitalopram oxalate (LEXAPRO) 10 MG tablet    fluticasone propionate (FLONASE) 50 mcg/actuation nasal spray    ibuprofen (ADVIL,MOTRIN) 200 MG tablet    MELATONIN ORAL    metoprolol succinate (TOPROL-XL) 50 MG 24 hr tablet    multivit with minerals/lutein (MULTIVITAMIN 50 PLUS ORAL)    tamsulosin (FLOMAX) 0.4 mg Cap     Current Facility-Administered Medications   Medication Frequency    LIDOcaine HCL 10 mg/ml (1%) injection 10 mL Once    LIDOcaine HCL 2% jelly Once          Social situation/Stressors: Peter Caldwell lives with his wife of 49 years (Mary) in [place], [place].  They also spend time with their daughter and son-in-law on the [facility].  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 Peter Caldwell has been  1x and has 2 adult daughters (Elin, Myah) and 2 grandchildren.  His spouse is also a retired teacher.  He had 4 siblings (2 now ).   The patient reports limited social support other than family ("a lot of my friends and colleagues have  and my best friend has Alzheimer's"). He is close to his daughters and sons-in-law.  Peter Caldwell is an active member of the [employer].  Peter Caldwell's hobbies include reading, spending time with family, and cruising/travel.  Additional stressors: none reported    Strengths:Housing " stability, Able to vocalize needs, Values and traditions, Motivation, readiness for change, Resources - social, interpersonal, monetary, Interpersonal relationships and supports available - family, relatives, friends, Cultural/spiritual/Buddhist and community involvement and Financial stability  Liabilities: Complicated medical illness    Current Evaluation:     Mental Status Exam: Peter Caldwell arrived promptly for the assessment session.  The patient was fully cooperative throughout the interview and was an adequate historian   Appearance: age appropriate, casually  dressed, well groomed  Behavior/Cooperation: friendly and cooperative  Speech: normal in rate, volume, and tone and appropriate quality, quantity and organization of sentences  Mood: anxious, dysthymic  Affect: dysphoric  Thought Process: goal-directed, logical  Thought Content: normal, no suicidality, no homicidality, delusions, or paranoia;did not appear to be responding to internal stimuli during the interview.   Orientation: grossly intact  Memory: Grossly intact  Attention Span/Concentration: Attends to interview without distraction; reports subjective difficulty  Fund of Knowledge: average  Estimate of Intelligence: above average from verbal skills and history  Cognition: grossly intact  Insight: patient has awareness of physical illness; limited insight into own behavior and behavior of others  Judgment: the patient's behavior is adequate to circumstances    Distress Score    Distress Score: 5        Practical Problems Physical Problems   : No Appearance: No   Housing: No Bathing / Dressing: No   Insurance / Financial: No Breathing: No    Transportation: No  Changes in Urination: No    Work / School: No  Constipation: No   Treatment Decisions: Yes  Diarrhea: No     Eating: Yes    Family Problems Fatigue: Yes    Dealing with Children: No Feeling Swollen: Yes    Dealing with Partner: No Fevers: No    Ability to Have Children: No   "Getting Around: No       Indigestion: No     Memory / Concentration: Yes   Emotional Problems Mouth Sores: Yes    Depression: Yes  Nausea: No    Fears: Yes  Nose Dry / Congested: Yes    Nervousness: Yes  Pain: No    Sadness: Yes Sexual: No    Worry: Yes Skin Dry / Itchy: Yes    Loss of Interest in Usual Activities: Yes Sleep: Yes     Substance Abuse: No    Spiritual/Religions Concerns Tingling in Hands / Feet: Yes   Spritual / Hoahaoism Concerns: Yes         Other Problems    Other Problems:: Concerned about onset of dementia.       MMSE:     Pain: 0    History of present illness:    Oncology History   Prostate cancer   3/12/2020 Initial Diagnosis    Prostate cancer     8/20/2021 Cancer Staged    Staging form: Prostate, AJCC 8th Edition  - Clinical stage from 8/20/2021: Stage IIB (cT1c, cN0, cM0, PSA: 9.8, Grade Group: 2)       Peter Caldwell has adjusted to illness with difficulty primarily through passive coping strategies, focus on family and prayer (daily morning prayers and meditation). He is having difficulty with ambiguity of future care planning ("They put off radiation for 6 months... how do I deal with this?"). He is frustrated that he is being confronted with his age ("I am 57 in my head, but 76 in my body."). He is worried that he has wasted his life (because he was not more focused on spiritual matters earlier in his life). He feels he is "on a roller coaster" with illness and does not know "what I have to do to prevail." He has engaged in appropriate information gathering.  The patient has good family/friend support.  His support system is coping well with the diagnosis/treatment/prognosis. Illness-related psychosocial stressors include changes in ability to engage in leisure activities.  The patient has a good partnership with his [facility] oncology treatment team. The patient reports the following barriers to cancer care:none.     Areas assessed:   · Mood: Depression: depressed mood, diminished interest, " "weight loss, hypersomnia, fatigue, worthlessness/guilt, poor concentration and social isolation;  prior depression:on and off since adolescence- felt he has "never measured up" to his older siblings who were more accomplished, "did not use my gifts as I should have," did not complete college on first try (but eventually got [employer]); no SI/HI- thoughts of suicide in adolescence- "but didn't want my parents to have to pay to bury me"; PHQ-9=14  · Kathrine: Denies  · Psychosis: Denies   · Anxiety: Feeling nervous, anxious, or on edge, Uncontrollable worry (about stability, finances, work), Excessive worry (interfering with mood, enjoyment), Difficulty relaxing, Restlessness, Irritability and Fear of unknown; anxiety throughout adulthood;  THOMAS-7=11  · Panic Disorder: Denies  · Trauma: Father  abruptly his senior year of HS, mother  2 years later, left without support/finances (often food insecure)- "humiliating"- led to dropping out of college and going into the [employer] for financial reasons  · Substance abuse: denied  · Cognitive functioning: very distressed by word finding difficulties and recent mild cognitive impairment [facility]  · Health behaviors: noncontributory  · Sleep: approx 11 hours sleep/night since Lupron injection; restful sleep  and no concerns , no sleep onset difficulty , denied signs/symptoms of sleep apnea, no history of sleep study, no EDS , no naps  and no restless legs, does report having "bad dreams" about demand situations and social judgment when he engages in "bad habits" (like watching the news before bed"); PM caffeine and no sleep hygiene considerations , no use of OTC/melatonin/hypnotics/benzodiazepines          Assessment - Diagnosis - Goals:       ICD-10-CM ICD-9-CM   1. Adjustment disorder with anxiety  F43.22 309.24     Plan:medication management by physician    Summary and Recommendations  Peter ROB Caldwell is a 76 y.o. male referred by Dr." "Brad for psychological evaluation and treatment.  Mr. Caldwell appears to be coping with some difficulty with his diagnosis and treatment course. He is distressed by the delay in the start of radiation and is engaging in self-judgment over his fatigue ("laziness") since starting Lupron injections. Provided information to patient about the common side effects of Lupron and mood impact of negative self-talk. These side effects are exacerbating patient's pre-existing tendency toward anxiety/depression. Patient is currently taking Lexapro (10 mg since January 2022) with unclear benefit. Patient was encouraged to speak to his primary care physician regarding psychotropic medication options. He is aware that we can facilitate referral to psychiatry if he wishes. Mood protective strategies during cancer treatment were discussed.  The patient is not currently interested in psychological intervention. The patient is aware of resources available should  his needs change in the future and will RTC with me, as desired.      Porfirio Mendoza, PhD  Clinical Psychologist  [place] License #[id number]  [place] License #[id number]        "

## 2022-05-02 NOTE — LETTER
"May 8, 2022        Pawel Salinas MD  [address]  4th [address]  [place] [address]             [facility] - Psychiatry  [address] [facility] [address]  Phone: [phone]  Fax: [phone]   Patient: Peter Caldwell   MR Number: [id number]   YOB: 1945   Date of Visit: 5/2/2022       Dear Dr. Salinas:    Thank you for referring Peter Caldwell to me for evaluation. Below are the relevant portions of my assessment and plan of care.     Peter Caldwell is a 76 y.o. male referred by Dr. Salinas for psychological evaluation and treatment.  Mr. Caldwell appears to be coping with some difficulty with his diagnosis and treatment course. He is distressed by the delay in the start of radiation and is engaging in self-judgment over his fatigue ("laziness") since starting Lupron injections. Provided information to patient about the common side effects of Lupron and mood impact of negative self-talk. These side effects are exacerbating patient's pre-existing tendency toward anxiety/depression. Patient is currently taking Lexapro (10 mg since January 2022) with unclear benefit. Patient was encouraged to speak to his primary care physician regarding psychotropic medication options. He is aware that we can facilitate referral to psychiatry if he wishes. Mood protective strategies during cancer treatment were discussed.  The patient is not currently interested in psychological intervention. The patient is aware of resources available should  his needs change in the future and will RTC with me, as desired.     If you have questions, please do not hesitate to call me. I look forward to following Peter along with you.    Sincerely,      Porfirio Nation, PhD           CC  No Recipients       "

## 2022-05-21 NOTE — TELEPHONE ENCOUNTER
No new care gaps identified.  [facility] Embedded Care Gaps. Reference number: [id number]. 5/21/2022   11:23:50 AM CDT

## 2022-05-21 NOTE — TELEPHONE ENCOUNTER
Refill Routing Note   Medication(s) are not appropriate for processing by [facility] for the following reason(s):      - Patient has not been seen in over 15 months by PCP  - Patient has been seen in the ED/Hospital since the last PCP visit    [facility] action(s):  Defer          Medication reconciliation completed: No     Appointments  past 12m or future 3m with PCP    Date Provider   Last Visit   1/7/2019 Rony Cardoso MD   Next Visit   Visit date not found Rony Cardoso MD   ED visits in past 90 days: 0        Note composed:4:02 PM 05/21/2022

## 2022-05-24 RX ORDER — FLUTICASONE PROPIONATE 50 MCG
SPRAY, SUSPENSION (ML) NASAL
Qty: 16 G | Refills: 5 | Status: SHIPPED | OUTPATIENT
Start: 2022-05-24 | End: 2022-11-17

## 2022-06-01 ENCOUNTER — HOSPITAL ENCOUNTER (OUTPATIENT)
Dept: RADIATION THERAPY | Facility: HOSPITAL | Age: 77
Discharge: HOME | End: 2022-06-01
Attending: RADIOLOGY
Payer: MEDICARE

## 2022-06-01 PROCEDURE — 77014 HC CT GUIDANCE RADIATION THERAPY FLDS PLACEMENT: CPT | Mod: TC,PN | Performed by: RADIOLOGY

## 2022-06-01 PROCEDURE — 77014 PR  CT GUIDANCE PLACEMENT RAD THERAPY FIELDS: ICD-10-PCS | Mod: 26,,, | Performed by: RADIOLOGY

## 2022-06-01 PROCEDURE — 77014 PR  CT GUIDANCE PLACEMENT RAD THERAPY FIELDS: CPT | Mod: 26,,, | Performed by: RADIOLOGY

## 2022-06-17 ENCOUNTER — OFFICE VISIT (OUTPATIENT)
Dept: FAMILY MEDICINE | Facility: CLINIC | Age: 77
End: 2022-06-17
Payer: MEDICARE

## 2022-06-17 ENCOUNTER — TELEPHONE (OUTPATIENT)
Dept: GASTROENTEROLOGY | Facility: CLINIC | Age: 77
End: 2022-06-17
Payer: MEDICARE

## 2022-06-17 ENCOUNTER — PATIENT MESSAGE (OUTPATIENT)
Dept: GASTROENTEROLOGY | Facility: CLINIC | Age: 77
End: 2022-06-17
Payer: MEDICARE

## 2022-06-17 VITALS
DIASTOLIC BLOOD PRESSURE: 64 MMHG | RESPIRATION RATE: 18 BRPM | SYSTOLIC BLOOD PRESSURE: 120 MMHG | HEART RATE: 68 BPM | WEIGHT: 210.19 LBS | BODY MASS INDEX: 27.86 KG/M2 | HEIGHT: 73 IN

## 2022-06-17 DIAGNOSIS — C61 PROSTATE CANCER: ICD-10-CM

## 2022-06-17 DIAGNOSIS — Z00.00 WELLNESS EXAMINATION: ICD-10-CM

## 2022-06-17 DIAGNOSIS — K40.90 INGUINAL HERNIA OF RIGHT SIDE WITHOUT OBSTRUCTION OR GANGRENE: ICD-10-CM

## 2022-06-17 DIAGNOSIS — I25.10 CORONARY ARTERY DISEASE INVOLVING NATIVE CORONARY ARTERY OF NATIVE HEART WITHOUT ANGINA PECTORIS: ICD-10-CM

## 2022-06-17 DIAGNOSIS — R19.4 CHANGE IN BOWEL HABIT: ICD-10-CM

## 2022-06-17 DIAGNOSIS — F43.22 ADJUSTMENT DISORDER WITH ANXIETY: ICD-10-CM

## 2022-06-17 DIAGNOSIS — I71.40 ABDOMINAL AORTIC ANEURYSM (AAA) WITHOUT RUPTURE: Primary | ICD-10-CM

## 2022-06-17 PROCEDURE — 90677 PCV20 VACCINE IM: CPT | Mod: PBBFAC,PO

## 2022-06-17 PROCEDURE — G0009 ADMIN PNEUMOCOCCAL VACCINE: HCPCS | Mod: PBBFAC,PO

## 2022-06-17 PROCEDURE — 99999 PR PBB SHADOW E&M-EST. PATIENT-LVL IV: ICD-10-PCS | Mod: PBBFAC,,, | Performed by: FAMILY MEDICINE

## 2022-06-17 PROCEDURE — 99214 OFFICE O/P EST MOD 30 MIN: CPT | Mod: S$PBB,,, | Performed by: FAMILY MEDICINE

## 2022-06-17 PROCEDURE — 99999 PR PBB SHADOW E&M-EST. PATIENT-LVL IV: CPT | Mod: PBBFAC,,, | Performed by: FAMILY MEDICINE

## 2022-06-17 PROCEDURE — 99214 PR OFFICE/OUTPT VISIT, EST, LEVL IV, 30-39 MIN: ICD-10-PCS | Mod: S$PBB,,, | Performed by: FAMILY MEDICINE

## 2022-06-17 PROCEDURE — 99214 OFFICE O/P EST MOD 30 MIN: CPT | Mod: PBBFAC,PO,25 | Performed by: FAMILY MEDICINE

## 2022-06-17 NOTE — PROGRESS NOTES
"Subjective:       Patient ID: Peter Caldwell is a 76 y.o. male.    Chief Complaint: Annual Exam and Establish Care    Patient is here to establish care.  He was moved.  Patient has a triple iron and is being followed by Cardiology.  He has an appointment made for someone local.  Patient also has a right-sided inguinal hernia that is bulging but is non tender.  Health maintenance reviewed and addressed.  Patient will be having a DTaP shot at his pharmacy and will be able to do the PCV 20 year.  Also review of labs no significant findings, follow-up in 6 months for repeat labs.    Review of Systems   Constitutional: Negative.    HENT: Negative.    Eyes: Negative.    Respiratory: Negative.    Cardiovascular: Negative.    Gastrointestinal: Negative.         Patient has an inguinal hernia on the right-hand side that is asymptomatic    Endocrine: Negative.    Genitourinary: Negative.    Musculoskeletal: Negative.    Skin: Negative.    Allergic/Immunologic: Negative.    Neurological: Negative.    Hematological: Negative.    Psychiatric/Behavioral: Negative.        Objective:      Vitals:    06/17/22 1006   BP: 120/64   Pulse: 68   Resp: 18   Weight: 95.4 kg (210 lb 3.3 oz)   Height: 6' 1" (1.854 m)      Physical Exam  Vitals and nursing note reviewed.   Constitutional:       Appearance: Normal appearance. He is obese.   HENT:      Head: Normocephalic and atraumatic.      Nose: Nose normal.      Mouth/Throat:      Mouth: Mucous membranes are moist.      Pharynx: Oropharynx is clear.   Eyes:      Extraocular Movements: Extraocular movements intact.      Conjunctiva/sclera: Conjunctivae normal.      Pupils: Pupils are equal, round, and reactive to light.   Cardiovascular:      Rate and Rhythm: Normal rate and regular rhythm.   Pulmonary:      Effort: Pulmonary effort is normal.      Breath sounds: Normal breath sounds.   Abdominal:      General: Abdomen is flat. Bowel sounds are normal.      Palpations: Abdomen is soft.     "  Comments: Examination of the inguinal areas are deferred for general surgery evaluation later after prostate   Musculoskeletal:         General: Normal range of motion.      Cervical back: Normal range of motion and neck supple.   Skin:     General: Skin is warm and dry.      Capillary Refill: Capillary refill takes less than 2 seconds.   Neurological:      General: No focal deficit present.      Mental Status: He is alert and oriented to person, place, and time.   Psychiatric:         Mood and Affect: Mood normal.         Behavior: Behavior normal.         Thought Content: Thought content normal.         Judgment: Judgment normal.         Results for orders placed or performed in visit on 02/25/22   Comprehensive Metabolic Panel   Result Value Ref Range    Sodium 141 136 - 145 mmol/L    Potassium 4.3 3.5 - 5.1 mmol/L    Chloride 103 95 - 110 mmol/L    CO2 28 23 - 29 mmol/L    Glucose 93 70 - 110 mg/dL    BUN 8 8 - 23 mg/dL    Creatinine 0.9 0.5 - 1.4 mg/dL    Calcium 9.0 8.7 - 10.5 mg/dL    Total Protein 6.4 6.0 - 8.4 g/dL    Albumin 3.7 3.5 - 5.2 g/dL    Total Bilirubin 1.1 (H) 0.1 - 1.0 mg/dL    Alkaline Phosphatase 49 (L) 55 - 135 U/L    AST 21 10 - 40 U/L    ALT 23 10 - 44 U/L    Anion Gap 10 8 - 16 mmol/L    eGFR if African American >60.0 >60 mL/min/1.73 m^2    eGFR if non African American >60.0 >60 mL/min/1.73 m^2   Lipid Panel   Result Value Ref Range    Cholesterol 142 120 - 199 mg/dL    Triglycerides 121 30 - 150 mg/dL    HDL 47 40 - 75 mg/dL    LDL Cholesterol 70.8 63.0 - 159.0 mg/dL    HDL/Cholesterol Ratio 33.1 20.0 - 50.0 %    Total Cholesterol/HDL Ratio 3.0 2.0 - 5.0    Non-HDL Cholesterol 95 mg/dL      Assessment:       1. Abdominal aortic aneurysm (AAA) without rupture    2. Coronary artery disease involving native coronary artery of native heart without angina pectoris    3. Prostate cancer    4. Adjustment disorder with anxiety    5. Inguinal hernia of right side without obstruction or gangrene     6. Wellness examination    7. Change in bowel habit         Plan:       Abdominal aortic aneurysm (AAA) without rupture  Comments:  Stable, followed by cadiology.    Coronary artery disease involving native coronary artery of native heart without angina pectoris  Comments:  Stable, followed by cardiology.    Prostate cancer  Comments:  Acrtively being followed by Rad Onc, Dr. Rodriguez.    Adjustment disorder with anxiety  Comments:  Challenging times, but stable.     Inguinal hernia of right side without obstruction or gangrene  Comments:  Inguinal hernia on the right-hand side will be monitored for now.  It has been repaired the past but currently is asymptomatic.    Wellness examination  -     Case Request Endoscopy: COLONOSCOPY    Change in bowel habit   -     Case Request Endoscopy: COLONOSCOPY    Other orders  -     (In Office Administered) Pneumococcal Conjugate Vaccine (20 Valent) (IM)          Medication List with Changes/Refills   Current Medications    ACETAMINOPHEN (TYLENOL) 325 MG TABLET    Take 325 mg by mouth every 6 (six) hours as needed for Pain.    ASPIRIN (ECOTRIN) 81 MG EC TABLET    Take 81 mg by mouth every evening. MD Sylvia Pisano RN; P Brad DASILVA Staff  Caller: Unspecified (Today,  2:44 PM)  As per notes no need to hold ASA.   If he can stop it that is fine. Likely less hematuria after biopsy but not required    ATORVASTATIN (LIPITOR) 80 MG TABLET    TAKE 1 TABLET(80 MG) BY MOUTH EVERY NIGHT    DUTASTERIDE (AVODART) 0.5 MG CAPSULE    Take 1 capsule (0.5 mg total) by mouth once daily.    ESCITALOPRAM OXALATE (LEXAPRO) 10 MG TABLET    Take 20 mg by mouth once daily.    FLUTICASONE PROPIONATE (FLONASE) 50 MCG/ACTUATION NASAL SPRAY    SHAKE LIQUID AND USE 1 SPRAY(50 MCG) IN EACH NOSTRIL EVERY DAY    IBUPROFEN (ADVIL,MOTRIN) 200 MG TABLET    Take 200 mg by mouth as needed for Pain. Hold 1 week prior to surgery    MELATONIN ORAL    Take by mouth.    METOPROLOL  SUCCINATE (TOPROL-XL) 50 MG 24 HR TABLET    Take 1 tablet (50 mg total) by mouth every evening.    MULTIVIT WITH MINERALS/LUTEIN (MULTIVITAMIN 50 PLUS ORAL)    Take by mouth.    TAMSULOSIN (FLOMAX) 0.4 MG CAP    TAKE 1 CAPSULE(0.4 MG) BY MOUTH EVERY DAY   Discontinued Medications    CYANOCOBALAMIN (VITAMIN B-12) 100 MCG TABLET    Take 100 mcg by mouth once daily.    DIAZEPAM (VALIUM) 5 MG TABLET    Take 1 tablet (5 mg total) by mouth every 6 (six) hours as needed for Anxiety. Take all 3 tablets 1 hour prior to procedure    DICLOFENAC SODIUM (VOLTAREN) 1 % GEL    APPLY 4 GRAMS TOPICALLY FOUR TIMES A DAY AS NEEDED FOR PAIN AND INFLAMMATION. USE ENCLOSED DOSING CARD.

## 2022-07-01 ENCOUNTER — HOSPITAL ENCOUNTER (OUTPATIENT)
Dept: RADIATION THERAPY | Facility: HOSPITAL | Age: 77
Discharge: HOME | End: 2022-07-01
Attending: RADIOLOGY
Payer: MEDICARE

## 2022-07-05 ENCOUNTER — PATIENT MESSAGE (OUTPATIENT)
Dept: ADMINISTRATIVE | Facility: OTHER | Age: 77
End: 2022-07-05
Payer: MEDICARE

## 2022-07-06 ENCOUNTER — PATIENT MESSAGE (OUTPATIENT)
Dept: RADIATION ONCOLOGY | Facility: CLINIC | Age: 77
End: 2022-07-06
Payer: MEDICARE

## 2022-07-22 ENCOUNTER — HOSPITAL ENCOUNTER (OUTPATIENT)
Dept: RADIATION THERAPY | Facility: HOSPITAL | Age: 77
Discharge: HOME | End: 2022-07-22
Attending: RADIOLOGY
Payer: MEDICARE

## 2022-07-22 PROCEDURE — 77014 PR  CT GUIDANCE PLACEMENT RAD THERAPY FIELDS: ICD-10-PCS | Mod: 26,,, | Performed by: RADIOLOGY

## 2022-07-22 PROCEDURE — 77014 HC CT GUIDANCE RADIATION THERAPY FLDS PLACEMENT: CPT | Mod: TC,PN | Performed by: RADIOLOGY

## 2022-07-22 PROCEDURE — 77014 PR  CT GUIDANCE PLACEMENT RAD THERAPY FIELDS: CPT | Mod: 26,,, | Performed by: RADIOLOGY

## 2022-07-22 PROCEDURE — 77263 THER RADIOLOGY TX PLNG CPLX: CPT | Mod: ,,, | Performed by: RADIOLOGY

## 2022-07-22 PROCEDURE — 77263 PR  RADIATION THERAPY PLAN COMPLEX: ICD-10-PCS | Mod: ,,, | Performed by: RADIOLOGY

## 2022-08-01 PROCEDURE — 77301 PR  INTEN MOD RADIOTHER PLAN W/DOSE VOL HIST: ICD-10-PCS | Mod: 26,,, | Performed by: RADIOLOGY

## 2022-08-01 PROCEDURE — 77301 RADIOTHERAPY DOSE PLAN IMRT: CPT | Mod: TC,PN | Performed by: RADIOLOGY

## 2022-08-01 PROCEDURE — 77301 RADIOTHERAPY DOSE PLAN IMRT: CPT | Mod: 26,,, | Performed by: RADIOLOGY

## 2022-08-02 ENCOUNTER — HOSPITAL ENCOUNTER (OUTPATIENT)
Dept: RADIATION THERAPY | Facility: HOSPITAL | Age: 77
Discharge: HOME | End: 2022-08-02
Attending: RADIOLOGY
Payer: MEDICARE

## 2022-08-02 ENCOUNTER — DOCUMENTATION ONLY (OUTPATIENT)
Dept: RADIATION ONCOLOGY | Facility: CLINIC | Age: 77
End: 2022-08-02
Payer: MEDICARE

## 2022-08-02 PROCEDURE — 77014 HC CT GUIDANCE RADIATION THERAPY FLDS PLACEMENT: CPT | Mod: TC,PN | Performed by: RADIOLOGY

## 2022-08-02 PROCEDURE — 77338 DESIGN MLC DEVICE FOR IMRT: CPT | Mod: 26,,, | Performed by: RADIOLOGY

## 2022-08-02 PROCEDURE — 77300 RADIATION THERAPY DOSE PLAN: CPT | Mod: TC,PN | Performed by: RADIOLOGY

## 2022-08-02 PROCEDURE — 77338 PR  MLC IMRT DESIGN & CONSTRUCTION PER IMRT PLAN: ICD-10-PCS | Mod: 26,,, | Performed by: RADIOLOGY

## 2022-08-02 PROCEDURE — 77014 PR  CT GUIDANCE PLACEMENT RAD THERAPY FIELDS: ICD-10-PCS | Mod: 26,,, | Performed by: RADIOLOGY

## 2022-08-02 PROCEDURE — 77300 RADIATION THERAPY DOSE PLAN: CPT | Mod: 26,,, | Performed by: RADIOLOGY

## 2022-08-02 PROCEDURE — 77385 HC IMRT, SIMPLE: CPT | Mod: PN | Performed by: RADIOLOGY

## 2022-08-02 PROCEDURE — 77338 DESIGN MLC DEVICE FOR IMRT: CPT | Mod: TC,59,PN | Performed by: RADIOLOGY

## 2022-08-02 PROCEDURE — 77300 PR RADIATION THERAPY,DOSIMETRY PLAN: ICD-10-PCS | Mod: 26,,, | Performed by: RADIOLOGY

## 2022-08-02 PROCEDURE — 77014 PR  CT GUIDANCE PLACEMENT RAD THERAPY FIELDS: CPT | Mod: 26,,, | Performed by: RADIOLOGY

## 2022-08-02 NOTE — PLAN OF CARE
Completed 1 of 44 outpatient radiation treatments without difficulty.  No problems noted at present.  Education, including pelvis pamphlet, provided.  All questions and concerns addressed.

## 2022-08-03 PROCEDURE — 77385 HC IMRT, SIMPLE: CPT | Mod: PN | Performed by: RADIOLOGY

## 2022-08-03 PROCEDURE — 77014 PR  CT GUIDANCE PLACEMENT RAD THERAPY FIELDS: ICD-10-PCS | Mod: 26,,, | Performed by: RADIOLOGY

## 2022-08-03 PROCEDURE — 77014 HC CT GUIDANCE RADIATION THERAPY FLDS PLACEMENT: CPT | Mod: TC,PN | Performed by: RADIOLOGY

## 2022-08-03 PROCEDURE — 77014 PR  CT GUIDANCE PLACEMENT RAD THERAPY FIELDS: CPT | Mod: 26,,, | Performed by: RADIOLOGY

## 2022-08-04 PROCEDURE — 77014 PR  CT GUIDANCE PLACEMENT RAD THERAPY FIELDS: CPT | Mod: 26,,, | Performed by: RADIOLOGY

## 2022-08-04 PROCEDURE — 77014 HC CT GUIDANCE RADIATION THERAPY FLDS PLACEMENT: CPT | Mod: TC,PN | Performed by: RADIOLOGY

## 2022-08-04 PROCEDURE — 77014 PR  CT GUIDANCE PLACEMENT RAD THERAPY FIELDS: ICD-10-PCS | Mod: 26,,, | Performed by: RADIOLOGY

## 2022-08-04 PROCEDURE — 77385 HC IMRT, SIMPLE: CPT | Mod: PN | Performed by: RADIOLOGY

## 2022-08-05 PROCEDURE — 77014 PR  CT GUIDANCE PLACEMENT RAD THERAPY FIELDS: ICD-10-PCS | Mod: 26,,, | Performed by: RADIOLOGY

## 2022-08-05 PROCEDURE — 77014 PR  CT GUIDANCE PLACEMENT RAD THERAPY FIELDS: CPT | Mod: 26,,, | Performed by: RADIOLOGY

## 2022-08-05 PROCEDURE — 77385 HC IMRT, SIMPLE: CPT | Mod: PN | Performed by: RADIOLOGY

## 2022-08-05 PROCEDURE — 77014 HC CT GUIDANCE RADIATION THERAPY FLDS PLACEMENT: CPT | Mod: TC,PN | Performed by: RADIOLOGY

## 2022-08-08 PROCEDURE — 77385 HC IMRT, SIMPLE: CPT | Mod: PN | Performed by: RADIOLOGY

## 2022-08-08 PROCEDURE — 77014 PR  CT GUIDANCE PLACEMENT RAD THERAPY FIELDS: CPT | Mod: 26,,, | Performed by: RADIOLOGY

## 2022-08-08 PROCEDURE — 77014 HC CT GUIDANCE RADIATION THERAPY FLDS PLACEMENT: CPT | Mod: TC,PN | Performed by: RADIOLOGY

## 2022-08-08 PROCEDURE — 77014 PR  CT GUIDANCE PLACEMENT RAD THERAPY FIELDS: ICD-10-PCS | Mod: 26,,, | Performed by: RADIOLOGY

## 2022-08-08 PROCEDURE — 77336 RADIATION PHYSICS CONSULT: CPT | Mod: PN | Performed by: RADIOLOGY

## 2022-08-09 ENCOUNTER — TELEPHONE (OUTPATIENT)
Dept: GASTROENTEROLOGY | Facility: CLINIC | Age: 77
End: 2022-08-09
Payer: MEDICARE

## 2022-08-09 ENCOUNTER — DOCUMENTATION ONLY (OUTPATIENT)
Dept: RADIATION ONCOLOGY | Facility: CLINIC | Age: 77
End: 2022-08-09
Payer: MEDICARE

## 2022-08-09 ENCOUNTER — PATIENT MESSAGE (OUTPATIENT)
Dept: CARDIOLOGY | Facility: CLINIC | Age: 77
End: 2022-08-09
Payer: MEDICARE

## 2022-08-09 PROCEDURE — 77014 HC CT GUIDANCE RADIATION THERAPY FLDS PLACEMENT: CPT | Mod: TC,PN | Performed by: RADIOLOGY

## 2022-08-09 PROCEDURE — 77014 PR  CT GUIDANCE PLACEMENT RAD THERAPY FIELDS: CPT | Mod: 26,,, | Performed by: RADIOLOGY

## 2022-08-09 PROCEDURE — 77385 HC IMRT, SIMPLE: CPT | Mod: PN | Performed by: RADIOLOGY

## 2022-08-09 PROCEDURE — 77014 PR  CT GUIDANCE PLACEMENT RAD THERAPY FIELDS: ICD-10-PCS | Mod: 26,,, | Performed by: RADIOLOGY

## 2022-08-09 NOTE — TELEPHONE ENCOUNTER
Spoke with pt. Canceled c-scope as pt is currently receiving xrt & will call back once he is able to receive c-scope. Pt verbalized understanding.     c-scope canceled

## 2022-08-09 NOTE — TELEPHONE ENCOUNTER
----- Message from Eugenio Valentino sent at 8/9/2022  1:07 PM CDT -----  Mr. Caldwell came to the surgery center this afternoon to cancel his  procedure scheduled on 9/20/22.  He asked that someone call him to confirm the cancellation because he has tried to cancel  procedures in the past and no one called him.  His number is [phone].  Thank you

## 2022-08-09 NOTE — PLAN OF CARE
Completed 6 of 44 outpatient treatments without difficulty.  No new problems noted.  All questions/concerns addressed this visit.

## 2022-08-10 PROCEDURE — 77014 HC CT GUIDANCE RADIATION THERAPY FLDS PLACEMENT: CPT | Mod: TC,PN | Performed by: RADIOLOGY

## 2022-08-10 PROCEDURE — 77014 PR  CT GUIDANCE PLACEMENT RAD THERAPY FIELDS: ICD-10-PCS | Mod: 26,,, | Performed by: RADIOLOGY

## 2022-08-10 PROCEDURE — 77385 HC IMRT, SIMPLE: CPT | Mod: PN | Performed by: RADIOLOGY

## 2022-08-10 PROCEDURE — 77014 PR  CT GUIDANCE PLACEMENT RAD THERAPY FIELDS: CPT | Mod: 26,,, | Performed by: RADIOLOGY

## 2022-08-11 PROCEDURE — 77014 PR  CT GUIDANCE PLACEMENT RAD THERAPY FIELDS: CPT | Mod: 26,,, | Performed by: RADIOLOGY

## 2022-08-11 PROCEDURE — 77385 HC IMRT, SIMPLE: CPT | Mod: PN | Performed by: RADIOLOGY

## 2022-08-11 PROCEDURE — 77014 HC CT GUIDANCE RADIATION THERAPY FLDS PLACEMENT: CPT | Mod: TC,PN | Performed by: RADIOLOGY

## 2022-08-11 PROCEDURE — 77014 PR  CT GUIDANCE PLACEMENT RAD THERAPY FIELDS: ICD-10-PCS | Mod: 26,,, | Performed by: RADIOLOGY

## 2022-08-15 PROCEDURE — 77385 HC IMRT, SIMPLE: CPT | Mod: PN | Performed by: RADIOLOGY

## 2022-08-15 PROCEDURE — 77014 PR  CT GUIDANCE PLACEMENT RAD THERAPY FIELDS: ICD-10-PCS | Mod: 26,,, | Performed by: RADIOLOGY

## 2022-08-15 PROCEDURE — 77014 PR  CT GUIDANCE PLACEMENT RAD THERAPY FIELDS: CPT | Mod: 26,,, | Performed by: RADIOLOGY

## 2022-08-15 PROCEDURE — 77014 HC CT GUIDANCE RADIATION THERAPY FLDS PLACEMENT: CPT | Mod: TC,PN | Performed by: RADIOLOGY

## 2022-08-16 ENCOUNTER — DOCUMENTATION ONLY (OUTPATIENT)
Dept: RADIATION ONCOLOGY | Facility: CLINIC | Age: 77
End: 2022-08-16
Payer: MEDICARE

## 2022-08-16 PROCEDURE — 77014 HC CT GUIDANCE RADIATION THERAPY FLDS PLACEMENT: CPT | Mod: TC,PN | Performed by: RADIOLOGY

## 2022-08-16 PROCEDURE — 77385 HC IMRT, SIMPLE: CPT | Mod: PN | Performed by: RADIOLOGY

## 2022-08-16 PROCEDURE — 77336 RADIATION PHYSICS CONSULT: CPT | Mod: PN | Performed by: RADIOLOGY

## 2022-08-16 PROCEDURE — 77014 PR  CT GUIDANCE PLACEMENT RAD THERAPY FIELDS: ICD-10-PCS | Mod: 26,,, | Performed by: RADIOLOGY

## 2022-08-16 PROCEDURE — 77014 PR  CT GUIDANCE PLACEMENT RAD THERAPY FIELDS: CPT | Mod: 26,,, | Performed by: RADIOLOGY

## 2022-08-16 NOTE — PLAN OF CARE
Completed 10 of 44 outpatient radiation treatments without difficulty.  No new problems noted.  All questions/concerns addressed this visit.

## 2022-08-17 PROCEDURE — 77014 HC CT GUIDANCE RADIATION THERAPY FLDS PLACEMENT: CPT | Mod: TC,PN | Performed by: RADIOLOGY

## 2022-08-17 PROCEDURE — 77385 HC IMRT, SIMPLE: CPT | Mod: PN | Performed by: RADIOLOGY

## 2022-08-17 PROCEDURE — 77014 PR  CT GUIDANCE PLACEMENT RAD THERAPY FIELDS: ICD-10-PCS | Mod: 26,,, | Performed by: RADIOLOGY

## 2022-08-17 PROCEDURE — 77014 PR  CT GUIDANCE PLACEMENT RAD THERAPY FIELDS: CPT | Mod: 26,,, | Performed by: RADIOLOGY

## 2022-08-18 PROCEDURE — 77014 HC CT GUIDANCE RADIATION THERAPY FLDS PLACEMENT: CPT | Mod: TC,PN | Performed by: RADIOLOGY

## 2022-08-18 PROCEDURE — 77385 HC IMRT, SIMPLE: CPT | Mod: PN | Performed by: RADIOLOGY

## 2022-08-18 PROCEDURE — 77014 PR  CT GUIDANCE PLACEMENT RAD THERAPY FIELDS: CPT | Mod: 26,,, | Performed by: RADIOLOGY

## 2022-08-18 PROCEDURE — 77014 PR  CT GUIDANCE PLACEMENT RAD THERAPY FIELDS: ICD-10-PCS | Mod: 26,,, | Performed by: RADIOLOGY

## 2022-08-19 PROCEDURE — 77014 HC CT GUIDANCE RADIATION THERAPY FLDS PLACEMENT: CPT | Mod: TC,PN | Performed by: RADIOLOGY

## 2022-08-19 PROCEDURE — 77385 HC IMRT, SIMPLE: CPT | Mod: PN | Performed by: RADIOLOGY

## 2022-08-19 PROCEDURE — 77014 PR  CT GUIDANCE PLACEMENT RAD THERAPY FIELDS: CPT | Mod: 26,,, | Performed by: RADIOLOGY

## 2022-08-19 PROCEDURE — 77014 PR  CT GUIDANCE PLACEMENT RAD THERAPY FIELDS: ICD-10-PCS | Mod: 26,,, | Performed by: RADIOLOGY

## 2022-08-22 PROCEDURE — 77385 HC IMRT, SIMPLE: CPT | Mod: PN | Performed by: RADIOLOGY

## 2022-08-22 PROCEDURE — 77014 PR  CT GUIDANCE PLACEMENT RAD THERAPY FIELDS: CPT | Mod: 26,,, | Performed by: RADIOLOGY

## 2022-08-22 PROCEDURE — 77014 HC CT GUIDANCE RADIATION THERAPY FLDS PLACEMENT: CPT | Mod: TC,PN | Performed by: RADIOLOGY

## 2022-08-22 PROCEDURE — 77014 PR  CT GUIDANCE PLACEMENT RAD THERAPY FIELDS: ICD-10-PCS | Mod: 26,,, | Performed by: RADIOLOGY

## 2022-08-23 ENCOUNTER — DOCUMENTATION ONLY (OUTPATIENT)
Dept: RADIATION ONCOLOGY | Facility: CLINIC | Age: 77
End: 2022-08-23
Payer: MEDICARE

## 2022-08-23 PROCEDURE — 77014 PR  CT GUIDANCE PLACEMENT RAD THERAPY FIELDS: CPT | Mod: 26,,, | Performed by: RADIOLOGY

## 2022-08-23 PROCEDURE — 77014 HC CT GUIDANCE RADIATION THERAPY FLDS PLACEMENT: CPT | Mod: TC,PN | Performed by: RADIOLOGY

## 2022-08-23 PROCEDURE — 77014 PR  CT GUIDANCE PLACEMENT RAD THERAPY FIELDS: ICD-10-PCS | Mod: 26,,, | Performed by: RADIOLOGY

## 2022-08-23 PROCEDURE — 77385 HC IMRT, SIMPLE: CPT | Mod: PN | Performed by: RADIOLOGY

## 2022-08-23 PROCEDURE — 77336 RADIATION PHYSICS CONSULT: CPT | Mod: PN | Performed by: RADIOLOGY

## 2022-08-23 NOTE — PLAN OF CARE
Completed 15 of 44 outpatient radiation treatments without difficulty.  No new problems noted.  All questions/concerns addressed this visit.

## 2022-08-24 ENCOUNTER — DOCUMENTATION ONLY (OUTPATIENT)
Dept: RADIATION ONCOLOGY | Facility: CLINIC | Age: 77
End: 2022-08-24
Payer: MEDICARE

## 2022-08-24 PROCEDURE — 77385 HC IMRT, SIMPLE: CPT | Mod: PN | Performed by: RADIOLOGY

## 2022-08-24 PROCEDURE — 77014 PR  CT GUIDANCE PLACEMENT RAD THERAPY FIELDS: ICD-10-PCS | Mod: 26,,, | Performed by: RADIOLOGY

## 2022-08-24 PROCEDURE — 77014 PR  CT GUIDANCE PLACEMENT RAD THERAPY FIELDS: CPT | Mod: 26,,, | Performed by: RADIOLOGY

## 2022-08-24 PROCEDURE — 77014 HC CT GUIDANCE RADIATION THERAPY FLDS PLACEMENT: CPT | Mod: TC,PN | Performed by: RADIOLOGY

## 2022-08-24 NOTE — PLAN OF CARE
.Pt here for 16 of 44 out patient radiation treatment . Pt no complaints and in stable condition.

## 2022-08-25 PROCEDURE — 77014 PR  CT GUIDANCE PLACEMENT RAD THERAPY FIELDS: CPT | Mod: 26,,, | Performed by: RADIOLOGY

## 2022-08-25 PROCEDURE — 77014 HC CT GUIDANCE RADIATION THERAPY FLDS PLACEMENT: CPT | Mod: TC,PN | Performed by: RADIOLOGY

## 2022-08-25 PROCEDURE — 77385 HC IMRT, SIMPLE: CPT | Mod: PN | Performed by: RADIOLOGY

## 2022-08-25 PROCEDURE — 77014 PR  CT GUIDANCE PLACEMENT RAD THERAPY FIELDS: ICD-10-PCS | Mod: 26,,, | Performed by: RADIOLOGY

## 2022-08-26 PROCEDURE — 77385 HC IMRT, SIMPLE: CPT | Mod: PN | Performed by: RADIOLOGY

## 2022-08-26 PROCEDURE — 77014 PR  CT GUIDANCE PLACEMENT RAD THERAPY FIELDS: CPT | Mod: 26,,, | Performed by: RADIOLOGY

## 2022-08-26 PROCEDURE — 77014 PR  CT GUIDANCE PLACEMENT RAD THERAPY FIELDS: ICD-10-PCS | Mod: 26,,, | Performed by: RADIOLOGY

## 2022-08-26 PROCEDURE — 77014 HC CT GUIDANCE RADIATION THERAPY FLDS PLACEMENT: CPT | Mod: TC,PN | Performed by: RADIOLOGY

## 2022-08-29 PROCEDURE — 77014 PR  CT GUIDANCE PLACEMENT RAD THERAPY FIELDS: ICD-10-PCS | Mod: 26,,, | Performed by: RADIOLOGY

## 2022-08-29 PROCEDURE — 77014 HC CT GUIDANCE RADIATION THERAPY FLDS PLACEMENT: CPT | Mod: TC,PN | Performed by: RADIOLOGY

## 2022-08-29 PROCEDURE — 77385 HC IMRT, SIMPLE: CPT | Mod: PN | Performed by: RADIOLOGY

## 2022-08-29 PROCEDURE — 77014 PR  CT GUIDANCE PLACEMENT RAD THERAPY FIELDS: CPT | Mod: 26,,, | Performed by: RADIOLOGY

## 2022-08-30 ENCOUNTER — DOCUMENTATION ONLY (OUTPATIENT)
Dept: RADIATION ONCOLOGY | Facility: CLINIC | Age: 77
End: 2022-08-30
Payer: MEDICARE

## 2022-08-30 PROCEDURE — 77014 PR  CT GUIDANCE PLACEMENT RAD THERAPY FIELDS: CPT | Mod: 26,,, | Performed by: RADIOLOGY

## 2022-08-30 PROCEDURE — 77014 HC CT GUIDANCE RADIATION THERAPY FLDS PLACEMENT: CPT | Mod: TC,PN | Performed by: RADIOLOGY

## 2022-08-30 PROCEDURE — 77385 HC IMRT, SIMPLE: CPT | Mod: PN | Performed by: RADIOLOGY

## 2022-08-30 PROCEDURE — 77336 RADIATION PHYSICS CONSULT: CPT | Mod: PN | Performed by: RADIOLOGY

## 2022-08-30 PROCEDURE — 77014 PR  CT GUIDANCE PLACEMENT RAD THERAPY FIELDS: ICD-10-PCS | Mod: 26,,, | Performed by: RADIOLOGY

## 2022-08-31 PROCEDURE — 77385 HC IMRT, SIMPLE: CPT | Mod: PN | Performed by: RADIOLOGY

## 2022-08-31 PROCEDURE — 77014 HC CT GUIDANCE RADIATION THERAPY FLDS PLACEMENT: CPT | Mod: TC,PN | Performed by: RADIOLOGY

## 2022-08-31 PROCEDURE — 77014 PR  CT GUIDANCE PLACEMENT RAD THERAPY FIELDS: CPT | Mod: 26,,, | Performed by: RADIOLOGY

## 2022-08-31 PROCEDURE — 77014 PR  CT GUIDANCE PLACEMENT RAD THERAPY FIELDS: ICD-10-PCS | Mod: 26,,, | Performed by: RADIOLOGY

## 2022-09-01 ENCOUNTER — HOSPITAL ENCOUNTER (OUTPATIENT)
Dept: RADIATION THERAPY | Facility: HOSPITAL | Age: 77
Discharge: HOME | End: 2022-09-01
Attending: RADIOLOGY
Payer: MEDICARE

## 2022-09-01 PROCEDURE — 77385 HC IMRT, SIMPLE: CPT | Mod: PN | Performed by: RADIOLOGY

## 2022-09-01 PROCEDURE — 77014 PR  CT GUIDANCE PLACEMENT RAD THERAPY FIELDS: ICD-10-PCS | Mod: 26,,, | Performed by: RADIOLOGY

## 2022-09-01 PROCEDURE — 77014 HC CT GUIDANCE RADIATION THERAPY FLDS PLACEMENT: CPT | Mod: TC,PN | Performed by: RADIOLOGY

## 2022-09-01 PROCEDURE — 77014 PR  CT GUIDANCE PLACEMENT RAD THERAPY FIELDS: CPT | Mod: 26,,, | Performed by: RADIOLOGY

## 2022-09-02 PROCEDURE — 77014 PR  CT GUIDANCE PLACEMENT RAD THERAPY FIELDS: CPT | Mod: 26,,, | Performed by: RADIOLOGY

## 2022-09-02 PROCEDURE — 77014 HC CT GUIDANCE RADIATION THERAPY FLDS PLACEMENT: CPT | Mod: TC,PN | Performed by: RADIOLOGY

## 2022-09-02 PROCEDURE — 77385 HC IMRT, SIMPLE: CPT | Mod: PN | Performed by: RADIOLOGY

## 2022-09-02 PROCEDURE — 77014 PR  CT GUIDANCE PLACEMENT RAD THERAPY FIELDS: ICD-10-PCS | Mod: 26,,, | Performed by: RADIOLOGY

## 2022-09-06 PROCEDURE — 77014 HC CT GUIDANCE RADIATION THERAPY FLDS PLACEMENT: CPT | Mod: TC,PN | Performed by: RADIOLOGY

## 2022-09-06 PROCEDURE — 77014 PR  CT GUIDANCE PLACEMENT RAD THERAPY FIELDS: ICD-10-PCS | Mod: 26,,, | Performed by: RADIOLOGY

## 2022-09-06 PROCEDURE — 77385 HC IMRT, SIMPLE: CPT | Mod: PN | Performed by: RADIOLOGY

## 2022-09-06 PROCEDURE — 77014 PR  CT GUIDANCE PLACEMENT RAD THERAPY FIELDS: CPT | Mod: 26,,, | Performed by: RADIOLOGY

## 2022-09-07 PROCEDURE — 77014 PR  CT GUIDANCE PLACEMENT RAD THERAPY FIELDS: ICD-10-PCS | Mod: 26,,, | Performed by: RADIOLOGY

## 2022-09-07 PROCEDURE — 77014 HC CT GUIDANCE RADIATION THERAPY FLDS PLACEMENT: CPT | Mod: TC,PN | Performed by: RADIOLOGY

## 2022-09-07 PROCEDURE — 77336 RADIATION PHYSICS CONSULT: CPT | Mod: PN | Performed by: RADIOLOGY

## 2022-09-07 PROCEDURE — 77014 PR  CT GUIDANCE PLACEMENT RAD THERAPY FIELDS: CPT | Mod: 26,,, | Performed by: RADIOLOGY

## 2022-09-07 PROCEDURE — 77385 HC IMRT, SIMPLE: CPT | Mod: PN | Performed by: RADIOLOGY

## 2022-09-08 ENCOUNTER — DOCUMENTATION ONLY (OUTPATIENT)
Dept: RADIATION ONCOLOGY | Facility: CLINIC | Age: 77
End: 2022-09-08
Payer: MEDICARE

## 2022-09-08 PROCEDURE — 77385 HC IMRT, SIMPLE: CPT | Mod: PN | Performed by: RADIOLOGY

## 2022-09-08 PROCEDURE — 77014 PR  CT GUIDANCE PLACEMENT RAD THERAPY FIELDS: CPT | Mod: 26,,, | Performed by: RADIOLOGY

## 2022-09-08 PROCEDURE — 77014 HC CT GUIDANCE RADIATION THERAPY FLDS PLACEMENT: CPT | Mod: TC,PN | Performed by: RADIOLOGY

## 2022-09-08 PROCEDURE — 77014 PR  CT GUIDANCE PLACEMENT RAD THERAPY FIELDS: ICD-10-PCS | Mod: 26,,, | Performed by: RADIOLOGY

## 2022-09-08 NOTE — PLAN OF CARE
Completed 26 of 44 outpatient radiation treatments without difficulty.  No new problems noted.  All questions/concerns addressed by MD this visit.

## 2022-09-09 PROCEDURE — 77385 HC IMRT, SIMPLE: CPT | Mod: PN | Performed by: RADIOLOGY

## 2022-09-09 PROCEDURE — 77014 PR  CT GUIDANCE PLACEMENT RAD THERAPY FIELDS: CPT | Mod: 26,,, | Performed by: RADIOLOGY

## 2022-09-09 PROCEDURE — 77014 HC CT GUIDANCE RADIATION THERAPY FLDS PLACEMENT: CPT | Mod: TC,PN | Performed by: RADIOLOGY

## 2022-09-09 PROCEDURE — 77014 PR  CT GUIDANCE PLACEMENT RAD THERAPY FIELDS: ICD-10-PCS | Mod: 26,,, | Performed by: RADIOLOGY

## 2022-09-12 PROCEDURE — 77385 HC IMRT, SIMPLE: CPT | Mod: PN | Performed by: RADIOLOGY

## 2022-09-12 PROCEDURE — 77014 PR  CT GUIDANCE PLACEMENT RAD THERAPY FIELDS: ICD-10-PCS | Mod: 26,,, | Performed by: RADIOLOGY

## 2022-09-12 PROCEDURE — 77014 HC CT GUIDANCE RADIATION THERAPY FLDS PLACEMENT: CPT | Mod: TC,PN | Performed by: RADIOLOGY

## 2022-09-12 PROCEDURE — 77014 PR  CT GUIDANCE PLACEMENT RAD THERAPY FIELDS: CPT | Mod: 26,,, | Performed by: RADIOLOGY

## 2022-09-13 ENCOUNTER — DOCUMENTATION ONLY (OUTPATIENT)
Dept: RADIATION ONCOLOGY | Facility: CLINIC | Age: 77
End: 2022-09-13
Payer: MEDICARE

## 2022-09-13 PROCEDURE — 77014 PR  CT GUIDANCE PLACEMENT RAD THERAPY FIELDS: CPT | Mod: 26,,, | Performed by: RADIOLOGY

## 2022-09-13 PROCEDURE — 77385 HC IMRT, SIMPLE: CPT | Mod: PN | Performed by: RADIOLOGY

## 2022-09-13 PROCEDURE — 77014 HC CT GUIDANCE RADIATION THERAPY FLDS PLACEMENT: CPT | Mod: TC,PN | Performed by: RADIOLOGY

## 2022-09-13 PROCEDURE — 77014 PR  CT GUIDANCE PLACEMENT RAD THERAPY FIELDS: ICD-10-PCS | Mod: 26,,, | Performed by: RADIOLOGY

## 2022-09-13 NOTE — PLAN OF CARE
Completed 29 of 44 outpatient radiation treatments without difficulty.  All questions/concerns addressed by MD this visit.

## 2022-09-14 PROCEDURE — 77014 HC CT GUIDANCE RADIATION THERAPY FLDS PLACEMENT: CPT | Mod: TC,PN | Performed by: RADIOLOGY

## 2022-09-14 PROCEDURE — 77014 PR  CT GUIDANCE PLACEMENT RAD THERAPY FIELDS: ICD-10-PCS | Mod: 26,,, | Performed by: RADIOLOGY

## 2022-09-14 PROCEDURE — 77336 RADIATION PHYSICS CONSULT: CPT | Mod: PN | Performed by: RADIOLOGY

## 2022-09-14 PROCEDURE — 77385 HC IMRT, SIMPLE: CPT | Mod: PN | Performed by: RADIOLOGY

## 2022-09-14 PROCEDURE — 77014 PR  CT GUIDANCE PLACEMENT RAD THERAPY FIELDS: CPT | Mod: 26,,, | Performed by: RADIOLOGY

## 2022-09-15 PROCEDURE — 77014 PR  CT GUIDANCE PLACEMENT RAD THERAPY FIELDS: ICD-10-PCS | Mod: 26,,, | Performed by: RADIOLOGY

## 2022-09-15 PROCEDURE — 77014 PR  CT GUIDANCE PLACEMENT RAD THERAPY FIELDS: CPT | Mod: 26,,, | Performed by: RADIOLOGY

## 2022-09-15 PROCEDURE — 77014 HC CT GUIDANCE RADIATION THERAPY FLDS PLACEMENT: CPT | Mod: TC,PN | Performed by: RADIOLOGY

## 2022-09-15 PROCEDURE — 77385 HC IMRT, SIMPLE: CPT | Mod: PN | Performed by: RADIOLOGY

## 2022-09-16 PROCEDURE — 77385 HC IMRT, SIMPLE: CPT | Mod: PN | Performed by: RADIOLOGY

## 2022-09-16 PROCEDURE — 77014 PR  CT GUIDANCE PLACEMENT RAD THERAPY FIELDS: ICD-10-PCS | Mod: 26,,, | Performed by: RADIOLOGY

## 2022-09-16 PROCEDURE — 77014 PR  CT GUIDANCE PLACEMENT RAD THERAPY FIELDS: CPT | Mod: 26,,, | Performed by: RADIOLOGY

## 2022-09-16 PROCEDURE — 77014 HC CT GUIDANCE RADIATION THERAPY FLDS PLACEMENT: CPT | Mod: TC,PN | Performed by: RADIOLOGY

## 2022-09-20 ENCOUNTER — DOCUMENTATION ONLY (OUTPATIENT)
Dept: RADIATION ONCOLOGY | Facility: CLINIC | Age: 77
End: 2022-09-20
Payer: MEDICARE

## 2022-09-20 PROCEDURE — 77014 PR  CT GUIDANCE PLACEMENT RAD THERAPY FIELDS: ICD-10-PCS | Mod: 26,,, | Performed by: RADIOLOGY

## 2022-09-20 PROCEDURE — 77014 HC CT GUIDANCE RADIATION THERAPY FLDS PLACEMENT: CPT | Mod: TC,PN | Performed by: RADIOLOGY

## 2022-09-20 PROCEDURE — 77014 PR  CT GUIDANCE PLACEMENT RAD THERAPY FIELDS: CPT | Mod: 26,,, | Performed by: RADIOLOGY

## 2022-09-20 PROCEDURE — 77385 HC IMRT, SIMPLE: CPT | Mod: PN | Performed by: RADIOLOGY

## 2022-09-20 NOTE — PLAN OF CARE
33 of 44 outpatient radiation treatments completed.  All questions and concerns addressed by MD this visit.

## 2022-09-21 PROCEDURE — 77385 HC IMRT, SIMPLE: CPT | Mod: PN | Performed by: RADIOLOGY

## 2022-09-21 PROCEDURE — 77014 HC CT GUIDANCE RADIATION THERAPY FLDS PLACEMENT: CPT | Mod: TC,PN | Performed by: RADIOLOGY

## 2022-09-21 PROCEDURE — 77014 PR  CT GUIDANCE PLACEMENT RAD THERAPY FIELDS: ICD-10-PCS | Mod: 26,,, | Performed by: RADIOLOGY

## 2022-09-21 PROCEDURE — 77014 PR  CT GUIDANCE PLACEMENT RAD THERAPY FIELDS: CPT | Mod: 26,,, | Performed by: RADIOLOGY

## 2022-09-22 PROCEDURE — 77385 HC IMRT, SIMPLE: CPT | Mod: PN | Performed by: RADIOLOGY

## 2022-09-22 PROCEDURE — 77014 HC CT GUIDANCE RADIATION THERAPY FLDS PLACEMENT: CPT | Mod: TC,PN | Performed by: RADIOLOGY

## 2022-09-22 PROCEDURE — 77014 PR  CT GUIDANCE PLACEMENT RAD THERAPY FIELDS: ICD-10-PCS | Mod: 26,,, | Performed by: RADIOLOGY

## 2022-09-22 PROCEDURE — 77014 PR  CT GUIDANCE PLACEMENT RAD THERAPY FIELDS: CPT | Mod: 26,,, | Performed by: RADIOLOGY

## 2022-09-23 PROCEDURE — 77336 RADIATION PHYSICS CONSULT: CPT | Mod: PN | Performed by: RADIOLOGY

## 2022-09-23 PROCEDURE — 77014 PR  CT GUIDANCE PLACEMENT RAD THERAPY FIELDS: ICD-10-PCS | Mod: 26,,, | Performed by: RADIOLOGY

## 2022-09-23 PROCEDURE — 77014 HC CT GUIDANCE RADIATION THERAPY FLDS PLACEMENT: CPT | Mod: TC,PN | Performed by: RADIOLOGY

## 2022-09-23 PROCEDURE — 77014 PR  CT GUIDANCE PLACEMENT RAD THERAPY FIELDS: CPT | Mod: 26,,, | Performed by: RADIOLOGY

## 2022-09-23 PROCEDURE — 77385 HC IMRT, SIMPLE: CPT | Mod: PN | Performed by: RADIOLOGY

## 2022-09-26 PROCEDURE — 77014 PR  CT GUIDANCE PLACEMENT RAD THERAPY FIELDS: ICD-10-PCS | Mod: 26,,, | Performed by: RADIOLOGY

## 2022-09-26 PROCEDURE — 77014 PR  CT GUIDANCE PLACEMENT RAD THERAPY FIELDS: CPT | Mod: 26,,, | Performed by: RADIOLOGY

## 2022-09-26 PROCEDURE — 77385 HC IMRT, SIMPLE: CPT | Mod: PN | Performed by: RADIOLOGY

## 2022-09-26 PROCEDURE — 77014 HC CT GUIDANCE RADIATION THERAPY FLDS PLACEMENT: CPT | Mod: TC,PN | Performed by: RADIOLOGY

## 2022-09-27 ENCOUNTER — DOCUMENTATION ONLY (OUTPATIENT)
Dept: RADIATION ONCOLOGY | Facility: CLINIC | Age: 77
End: 2022-09-27
Payer: MEDICARE

## 2022-09-27 PROCEDURE — 77014 PR  CT GUIDANCE PLACEMENT RAD THERAPY FIELDS: CPT | Mod: 26,,, | Performed by: RADIOLOGY

## 2022-09-27 PROCEDURE — 77014 HC CT GUIDANCE RADIATION THERAPY FLDS PLACEMENT: CPT | Mod: TC,PN | Performed by: RADIOLOGY

## 2022-09-27 PROCEDURE — 77385 HC IMRT, SIMPLE: CPT | Mod: PN | Performed by: RADIOLOGY

## 2022-09-27 PROCEDURE — 77014 PR  CT GUIDANCE PLACEMENT RAD THERAPY FIELDS: ICD-10-PCS | Mod: 26,,, | Performed by: RADIOLOGY

## 2022-09-27 NOTE — PLAN OF CARE
Completed 38 of 44 outpatient radiation treatments without difficulty.  All questions/concerns addressed by MD this visit.

## 2022-09-28 PROCEDURE — 77014 HC CT GUIDANCE RADIATION THERAPY FLDS PLACEMENT: CPT | Mod: TC,PN | Performed by: RADIOLOGY

## 2022-09-28 PROCEDURE — 77385 HC IMRT, SIMPLE: CPT | Mod: PN | Performed by: RADIOLOGY

## 2022-09-28 PROCEDURE — 77014 PR  CT GUIDANCE PLACEMENT RAD THERAPY FIELDS: CPT | Mod: 26,,, | Performed by: RADIOLOGY

## 2022-09-28 PROCEDURE — 77014 PR  CT GUIDANCE PLACEMENT RAD THERAPY FIELDS: ICD-10-PCS | Mod: 26,,, | Performed by: RADIOLOGY

## 2022-09-29 PROCEDURE — 77385 HC IMRT, SIMPLE: CPT | Mod: PN | Performed by: RADIOLOGY

## 2022-09-29 PROCEDURE — 77014 PR  CT GUIDANCE PLACEMENT RAD THERAPY FIELDS: CPT | Mod: 26,,, | Performed by: RADIOLOGY

## 2022-09-29 PROCEDURE — 77014 HC CT GUIDANCE RADIATION THERAPY FLDS PLACEMENT: CPT | Mod: TC,PN | Performed by: RADIOLOGY

## 2022-09-29 PROCEDURE — 77014 PR  CT GUIDANCE PLACEMENT RAD THERAPY FIELDS: ICD-10-PCS | Mod: 26,,, | Performed by: RADIOLOGY

## 2022-09-30 PROCEDURE — 77014 PR  CT GUIDANCE PLACEMENT RAD THERAPY FIELDS: CPT | Mod: 26,,, | Performed by: RADIOLOGY

## 2022-09-30 PROCEDURE — 77014 HC CT GUIDANCE RADIATION THERAPY FLDS PLACEMENT: CPT | Mod: TC,PN | Performed by: RADIOLOGY

## 2022-09-30 PROCEDURE — 77385 HC IMRT, SIMPLE: CPT | Mod: PN | Performed by: RADIOLOGY

## 2022-09-30 PROCEDURE — 77336 RADIATION PHYSICS CONSULT: CPT | Mod: PN | Performed by: RADIOLOGY

## 2022-09-30 PROCEDURE — 77014 PR  CT GUIDANCE PLACEMENT RAD THERAPY FIELDS: ICD-10-PCS | Mod: 26,,, | Performed by: RADIOLOGY

## 2022-10-03 ENCOUNTER — HOSPITAL ENCOUNTER (OUTPATIENT)
Dept: RADIATION THERAPY | Facility: HOSPITAL | Age: 77
Discharge: HOME | End: 2022-10-03
Attending: RADIOLOGY
Payer: MEDICARE

## 2022-10-03 PROCEDURE — 77014 HC CT GUIDANCE RADIATION THERAPY FLDS PLACEMENT: CPT | Mod: TC,PN | Performed by: RADIOLOGY

## 2022-10-03 PROCEDURE — 77014 PR  CT GUIDANCE PLACEMENT RAD THERAPY FIELDS: ICD-10-PCS | Mod: 26,,, | Performed by: RADIOLOGY

## 2022-10-03 PROCEDURE — 77385 HC IMRT, SIMPLE: CPT | Mod: PN | Performed by: RADIOLOGY

## 2022-10-03 PROCEDURE — 77014 PR  CT GUIDANCE PLACEMENT RAD THERAPY FIELDS: CPT | Mod: 26,,, | Performed by: RADIOLOGY

## 2022-10-04 ENCOUNTER — DOCUMENTATION ONLY (OUTPATIENT)
Dept: RADIATION ONCOLOGY | Facility: CLINIC | Age: 77
End: 2022-10-04
Payer: MEDICARE

## 2022-10-04 PROCEDURE — 77014 PR  CT GUIDANCE PLACEMENT RAD THERAPY FIELDS: CPT | Mod: 26,,, | Performed by: RADIOLOGY

## 2022-10-04 PROCEDURE — 77385 HC IMRT, SIMPLE: CPT | Mod: PN | Performed by: RADIOLOGY

## 2022-10-04 PROCEDURE — 77014 PR  CT GUIDANCE PLACEMENT RAD THERAPY FIELDS: ICD-10-PCS | Mod: 26,,, | Performed by: RADIOLOGY

## 2022-10-04 PROCEDURE — 77014 HC CT GUIDANCE RADIATION THERAPY FLDS PLACEMENT: CPT | Mod: TC,PN | Performed by: RADIOLOGY

## 2022-10-04 NOTE — PLAN OF CARE
Completed 43 of 44 outpatient radiation treatments without difficulty.  Will finish tx tomorrow.  All questions/concerns addressed by MD this visit.

## 2022-10-05 PROCEDURE — 77014 PR  CT GUIDANCE PLACEMENT RAD THERAPY FIELDS: CPT | Mod: 26,,, | Performed by: RADIOLOGY

## 2022-10-05 PROCEDURE — 77014 PR  CT GUIDANCE PLACEMENT RAD THERAPY FIELDS: ICD-10-PCS | Mod: 26,,, | Performed by: RADIOLOGY

## 2022-10-05 PROCEDURE — 77385 HC IMRT, SIMPLE: CPT | Mod: PN | Performed by: RADIOLOGY

## 2022-10-05 PROCEDURE — 77336 RADIATION PHYSICS CONSULT: CPT | Mod: PN | Performed by: RADIOLOGY

## 2022-10-05 PROCEDURE — 77014 HC CT GUIDANCE RADIATION THERAPY FLDS PLACEMENT: CPT | Mod: TC,PN | Performed by: RADIOLOGY

## 2022-10-07 ENCOUNTER — OFFICE VISIT (OUTPATIENT)
Dept: CARDIOLOGY | Facility: CLINIC | Age: 77
End: 2022-10-07
Payer: MEDICARE

## 2022-10-07 VITALS
WEIGHT: 215.81 LBS | HEART RATE: 59 BPM | HEIGHT: 73 IN | BODY MASS INDEX: 28.6 KG/M2 | DIASTOLIC BLOOD PRESSURE: 60 MMHG | SYSTOLIC BLOOD PRESSURE: 111 MMHG

## 2022-10-07 DIAGNOSIS — I71.43 INFRARENAL ABDOMINAL AORTIC ANEURYSM (AAA) WITHOUT RUPTURE: ICD-10-CM

## 2022-10-07 DIAGNOSIS — Z98.61 S/P PTCA (PERCUTANEOUS TRANSLUMINAL CORONARY ANGIOPLASTY): ICD-10-CM

## 2022-10-07 DIAGNOSIS — E78.00 PURE HYPERCHOLESTEROLEMIA: ICD-10-CM

## 2022-10-07 DIAGNOSIS — I10 BENIGN ESSENTIAL HYPERTENSION: ICD-10-CM

## 2022-10-07 DIAGNOSIS — Z95.1 S/P CABG (CORONARY ARTERY BYPASS GRAFT): ICD-10-CM

## 2022-10-07 DIAGNOSIS — I25.2 OLD MI (MYOCARDIAL INFARCTION): ICD-10-CM

## 2022-10-07 DIAGNOSIS — I25.10 CORONARY ARTERY DISEASE INVOLVING NATIVE CORONARY ARTERY OF NATIVE HEART WITHOUT ANGINA PECTORIS: Primary | ICD-10-CM

## 2022-10-07 PROBLEM — I71.9 AORTIC ANEURYSM: Status: RESOLVED | Noted: 2022-10-07 | Resolved: 2022-10-07

## 2022-10-07 PROBLEM — I71.9 AORTIC ANEURYSM: Status: ACTIVE | Noted: 2022-10-07

## 2022-10-07 PROCEDURE — 93010 ELECTROCARDIOGRAM REPORT: CPT | Mod: S$PBB,,, | Performed by: INTERNAL MEDICINE

## 2022-10-07 PROCEDURE — 93005 ELECTROCARDIOGRAM TRACING: CPT | Mod: PBBFAC,PO | Performed by: INTERNAL MEDICINE

## 2022-10-07 PROCEDURE — 99214 PR OFFICE/OUTPT VISIT, EST, LEVL IV, 30-39 MIN: ICD-10-PCS | Mod: S$PBB,25,, | Performed by: INTERNAL MEDICINE

## 2022-10-07 PROCEDURE — 99214 OFFICE O/P EST MOD 30 MIN: CPT | Mod: S$PBB,25,, | Performed by: INTERNAL MEDICINE

## 2022-10-07 PROCEDURE — 93010 EKG 12-LEAD: ICD-10-PCS | Mod: S$PBB,,, | Performed by: INTERNAL MEDICINE

## 2022-10-07 PROCEDURE — 99999 PR PBB SHADOW E&M-EST. PATIENT-LVL IV: ICD-10-PCS | Mod: PBBFAC,,, | Performed by: INTERNAL MEDICINE

## 2022-10-07 PROCEDURE — 99999 PR PBB SHADOW E&M-EST. PATIENT-LVL IV: CPT | Mod: PBBFAC,,, | Performed by: INTERNAL MEDICINE

## 2022-10-07 PROCEDURE — 99214 OFFICE O/P EST MOD 30 MIN: CPT | Mod: PBBFAC,PO | Performed by: INTERNAL MEDICINE

## 2022-10-07 RX ORDER — METOPROLOL SUCCINATE 50 MG/1
50 TABLET, EXTENDED RELEASE ORAL NIGHTLY
Qty: 90 TABLET | Refills: 3 | Status: SHIPPED | OUTPATIENT
Start: 2022-10-07 | End: 2023-07-25

## 2022-10-07 RX ORDER — ATORVASTATIN CALCIUM 80 MG/1
TABLET, FILM COATED ORAL
Qty: 90 TABLET | Refills: 3 | Status: SHIPPED | OUTPATIENT
Start: 2022-10-07 | End: 2023-10-13

## 2022-10-07 RX ORDER — EZETIMIBE 10 MG/1
10 TABLET ORAL DAILY
Qty: 90 TABLET | Refills: 3 | Status: SHIPPED | OUTPATIENT
Start: 2022-10-07 | End: 2023-09-25 | Stop reason: SDUPTHER

## 2022-10-07 NOTE — PROGRESS NOTES
Subjective:   10/07/2022     Patient ID:  Peter Caldwell is a 76 y.o. male who presents for evaulation of Coronary Artery Disease, Hypertension, s/p CABG, and s/p PTCA      Patient here for assumption of cardiac follow-up.  Does have coronary artery disease and is status post coronary artery bypass grafting.  Echocardiography since then has shown evidence for anteroapical hypokinesis with mildly decreased systolic function.    Hypertension is present, currently treated with metoprolol.      Hypercholesterolemia is present.  In February of 2022, his LDL cholesterol 71 on atorvastatin 80 mg daily      Past Medical History:   Diagnosis Date    AAA (abdominal aortic aneurysm)     Arthritis     BPH (benign prostatic hyperplasia)     CAD (coronary artery disease)     khuri    Cataract     Colon polyps 2006    Depression     Ex-smoker     Hx of psychiatric care     Lexapro, valium    Hyperlipidemia     Mood disorder     chalasani    Old MI (myocardial infarction) 10/7/2022    ELINA (obstructive sleep apnea)     Osteopenia 4/15 aylin 4/20    Other insomnia 1/7/2019    Prostate cancer 3/12/2020    S/P CABG (coronary artery bypass graft) 8/9/2013    S/P PTCA (percutaneous transluminal coronary angioplasty) 8/9/2013    Therapy        Review of patient's allergies indicates:   Allergen Reactions    Oxycodone Other (See Comments)    Penicillins      Other reaction(s): Rash    Promethazine Other (See Comments)     paranoia    Percodan [oxycodone hcl-oxycodone-asa]      Sleep / nightmare problems           Current Outpatient Medications:     acetaminophen (TYLENOL) 325 MG tablet, Take 325 mg by mouth every 6 (six) hours as needed for Pain., Disp: , Rfl:     aspirin (ECOTRIN) 81 MG EC tablet, Take 81 mg by mouth every evening. MD Sylvia Pisano RN; P Brad DASILVA Staff Caller: Unspecified (Today,  2:44 PM) As per notes no need to hold ASA.  If he can stop it that is fine. Likely less hematuria after  biopsy but not required, Disp: , Rfl:     dutasteride (AVODART) 0.5 mg capsule, Take 1 capsule (0.5 mg total) by mouth once daily., Disp: 30 capsule, Rfl: 11    EScitalopram oxalate (LEXAPRO) 10 MG tablet, Take 20 mg by mouth once daily., Disp: , Rfl:     fluticasone propionate (FLONASE) 50 mcg/actuation nasal spray, SHAKE LIQUID AND USE 1 SPRAY(50 MCG) IN EACH NOSTRIL EVERY DAY, Disp: 16 g, Rfl: 5    ibuprofen (ADVIL,MOTRIN) 200 MG tablet, Take 200 mg by mouth as needed for Pain. Hold 1 week prior to surgery, Disp: , Rfl:     MELATONIN ORAL, Take by mouth as needed., Disp: , Rfl:     multivit with minerals/lutein (MULTIVITAMIN 50 PLUS ORAL), Take by mouth., Disp: , Rfl:     tamsulosin (FLOMAX) 0.4 mg Cap, TAKE 1 CAPSULE(0.4 MG) BY MOUTH EVERY DAY (Patient taking differently: 0.8 mg once daily.), Disp: 90 capsule, Rfl: 3    atorvastatin (LIPITOR) 80 MG tablet, TAKE 1 TABLET(80 MG) BY MOUTH EVERY NIGHT, Disp: 90 tablet, Rfl: 3    ezetimibe (ZETIA) 10 mg tablet, Take 1 tablet (10 mg total) by mouth once daily., Disp: 90 tablet, Rfl: 3    metoprolol succinate (TOPROL-XL) 50 MG 24 hr tablet, Take 1 tablet (50 mg total) by mouth every evening., Disp: 90 tablet, Rfl: 3    Current Facility-Administered Medications:     LIDOcaine HCL 10 mg/ml (1%) injection 10 mL, 10 mL, Infiltration, Once, Pawel Salinas MD    LIDOcaine HCL 2% jelly, , Topical (Top), Once, Pawel Salinas MD     Objective:   Review of Systems   Constitutional: Positive for malaise/fatigue and weight gain.   Cardiovascular:  Negative for chest pain, claudication, cyanosis, dyspnea on exertion, irregular heartbeat, leg swelling, near-syncope, orthopnea, palpitations, paroxysmal nocturnal dyspnea and syncope.   Neurological:  Positive for excessive daytime sleepiness.   Psychiatric/Behavioral:  Positive for depression. The patient is nervous/anxious.        Vitals:    10/07/22 1101   BP: 111/60   Pulse: (!) 59     Wt Readings from Last 3 Encounters:    10/07/22 97.9 kg (215 lb 13.3 oz)   06/17/22 95.4 kg (210 lb 3.3 oz)   03/02/22 96.6 kg (212 lb 15.4 oz)     Temp Readings from Last 3 Encounters:   02/01/22 98.1 °F (36.7 °C) (Temporal)   01/20/22 98.8 °F (37.1 °C)   08/20/21 98.4 °F (36.9 °C)     BP Readings from Last 3 Encounters:   10/07/22 111/60   06/17/22 120/64   03/02/22 112/68     Pulse Readings from Last 3 Encounters:   10/07/22 (!) 59   06/17/22 68   03/02/22 61             Physical Exam  Vitals reviewed.   Constitutional:       General: He is not in acute distress.     Appearance: He is well-developed.   HENT:      Head: Normocephalic and atraumatic.      Nose: Nose normal.   Eyes:      Conjunctiva/sclera: Conjunctivae normal.      Pupils: Pupils are equal, round, and reactive to light.   Neck:      Vascular: No JVD.   Cardiovascular:      Rate and Rhythm: Normal rate and regular rhythm.      Pulses: Intact distal pulses.      Heart sounds: Normal heart sounds. No murmur heard.    No friction rub. No gallop.   Pulmonary:      Effort: Pulmonary effort is normal. No respiratory distress.      Breath sounds: Normal breath sounds. No wheezing or rales.   Chest:      Chest wall: No tenderness.   Abdominal:      General: Bowel sounds are normal. There is no distension.      Palpations: Abdomen is soft.      Tenderness: There is no abdominal tenderness.   Musculoskeletal:         General: No tenderness or deformity. Normal range of motion.      Cervical back: Normal range of motion and neck supple.   Skin:     General: Skin is warm and dry.      Findings: No erythema or rash.   Neurological:      Mental Status: He is alert and oriented to person, place, and time.      Cranial Nerves: No cranial nerve deficit.      Motor: No abnormal muscle tone.      Coordination: Coordination normal.   Psychiatric:         Behavior: Behavior normal.         Thought Content: Thought content normal.         Judgment: Judgment normal.         Lab Results   Component Value Date     CHOL 142 02/25/2022    CHOL 131 08/11/2021    CHOL 133 02/03/2021     Lab Results   Component Value Date    HDL 47 02/25/2022    HDL 44 08/11/2021    HDL 44 02/03/2021     Lab Results   Component Value Date    LDLCALC 70.8 02/25/2022    LDLCALC 67.2 08/11/2021    LDLCALC 69.6 02/03/2021     Lab Results   Component Value Date    ALT 23 02/25/2022    AST 21 02/25/2022    AST 23 08/11/2021    AST 19 02/03/2021     Lab Results   Component Value Date    CREATININE 0.9 02/25/2022    BUN 8 02/25/2022     02/25/2022    K 4.3 02/25/2022    CO2 28 02/25/2022    CO2 30 (H) 08/11/2021    CO2 25 02/03/2021     Lab Results   Component Value Date    HGB 15.9 05/10/2021    HCT 46.9 05/10/2021    HCT 46.0 06/02/2009    HCT 42.9 03/15/2005                         Assessment and Plan:     Coronary artery disease involving native coronary artery of native heart without angina pectoris  Comments:  Remains asymptomatic  Orders:  -     IN OFFICE EKG 12-LEAD (to [facility])  -     Echo; Future; Expected date: 10/14/2022    S/P CABG (coronary artery bypass graft)  -     IN OFFICE EKG 12-LEAD (to [facility])    Benign essential hypertension  Comments:  Well controlled only on metoprolol  Orders:  -     metoprolol succinate (TOPROL-XL) 50 MG 24 hr tablet; Take 1 tablet (50 mg total) by mouth every evening.  Dispense: 90 tablet; Refill: 3    Pure hypercholesterolemia  Comments:  LDL goal less than 70, close to 50, add ezetimibe to atorvastatin  Recheck with PCP  Orders:  -     atorvastatin (LIPITOR) 80 MG tablet; TAKE 1 TABLET(80 MG) BY MOUTH EVERY NIGHT  Dispense: 90 tablet; Refill: 3  -     ezetimibe (ZETIA) 10 mg tablet; Take 1 tablet (10 mg total) by mouth once daily.  Dispense: 90 tablet; Refill: 3    Infrarenal abdominal aortic aneurysm (AAA) without rupture  -     CV Abdominal Aorta; Future; Expected date: 10/14/2022    S/P PTCA (percutaneous transluminal coronary angioplasty)    Old MI (myocardial infarction)  -     Echo; Future; Expected  date: 10/14/2022       Follow up in about 6 months (around 4/7/2023).          No future appointments.

## 2022-10-18 ENCOUNTER — HOSPITAL ENCOUNTER (OUTPATIENT)
Dept: CARDIOLOGY | Facility: HOSPITAL | Age: 77
Discharge: HOME | End: 2022-10-18
Attending: INTERNAL MEDICINE
Payer: MEDICARE

## 2022-10-18 VITALS
HEIGHT: 73 IN | HEART RATE: 45 BPM | DIASTOLIC BLOOD PRESSURE: 74 MMHG | SYSTOLIC BLOOD PRESSURE: 120 MMHG | WEIGHT: 215 LBS | BODY MASS INDEX: 28.49 KG/M2

## 2022-10-18 DIAGNOSIS — I25.2 OLD MI (MYOCARDIAL INFARCTION): ICD-10-CM

## 2022-10-18 DIAGNOSIS — I25.10 CORONARY ARTERY DISEASE INVOLVING NATIVE CORONARY ARTERY OF NATIVE HEART WITHOUT ANGINA PECTORIS: ICD-10-CM

## 2022-10-18 DIAGNOSIS — I71.43 INFRARENAL ABDOMINAL AORTIC ANEURYSM (AAA) WITHOUT RUPTURE: ICD-10-CM

## 2022-10-18 LAB
ABDOMINAL IMA AP: 2.78 CM
ABDOMINAL IMA ED VEL: 13 CM/S
ABDOMINAL IMA PS VEL: 106 CM/S
ABDOMINAL IMA TRANS: 2.76 CM
ABDOMINAL INFRARENAL AORTA AP: 3.28 CM
ABDOMINAL INFRARENAL AORTA ED VEL: 8 CM/S
ABDOMINAL INFRARENAL AORTA PS VEL: 48 CM/S
ABDOMINAL INFRARENAL AORTA TRANS: 3.28 CM
ABDOMINAL JUXTARENAL AORTA AP: 0.86 CM
ABDOMINAL JUXTARENAL AORTA ED VEL: 12 CM/S
ABDOMINAL JUXTARENAL AORTA PS VEL: 82 CM/S
ABDOMINAL JUXTARENAL AORTA TRANS: 1.86 CM
ABDOMINAL LT COM ILIAC AP: 1.16 CM
ABDOMINAL LT COM ILIAC TRANS: 1.14 CM
ABDOMINAL LT COM ILIAC VEL: 68 CM/S
ABDOMINAL LT COM ILLIAC ED VEL: 10 CM/S
ABDOMINAL RT COM ILIAC AP: 1.15 CM
ABDOMINAL RT COM ILIAC TRANS: 1.15 CM
ABDOMINAL RT COM ILIAC VEL: 126 CM/S
ABDOMINAL RT COM ILLIAC ED VEL: 12 CM/S
ABDOMINAL SUPRARENAL AORTA AP: 2.58 CM
ABDOMINAL SUPRARENAL AORTA ED VEL: 10 CM/S
ABDOMINAL SUPRARENAL AORTA PS VEL: 74 CM/S
ABDOMINAL SUPRARENAL AORTA TRANS: 2.58 CM
ASCENDING AORTA: 3.58 CM
AV INDEX (PROSTH): 0.61
AV MEAN GRADIENT: 5 MMHG
AV PEAK GRADIENT: 8 MMHG
AV VALVE AREA: 2.82 CM2
AV VELOCITY RATIO: 0.64
BSA FOR ECHO PROCEDURE: 2.24 M2
CV ECHO LV RWT: 0.38 CM
DOP CALC AO PEAK VEL: 1.43 M/S
DOP CALC AO VTI: 36.8 CM
DOP CALC LVOT AREA: 4.6 CM2
DOP CALC LVOT DIAMETER: 2.42 CM
DOP CALC LVOT PEAK VEL: 0.91 M/S
DOP CALC LVOT STROKE VOLUME: 103.71 CM3
DOP CALCLVOT PEAK VEL VTI: 22.56 CM
E WAVE DECELERATION TIME: 336.13 MSEC
E/A RATIO: 0.98
E/E' RATIO: 15.09 M/S
ECHO LV POSTERIOR WALL: 0.91 CM (ref 0.6–1.1)
EJECTION FRACTION: 55 %
FRACTIONAL SHORTENING: 21 % (ref 28–44)
INTERVENTRICULAR SEPTUM: 1 CM (ref 0.6–1.1)
IVRT: 139.87 MSEC
LA MAJOR: 5.39 CM
LA MINOR: 5.28 CM
LA WIDTH: 3.62 CM
LEFT ATRIUM SIZE: 4.19 CM
LEFT ATRIUM VOLUME INDEX MOD: 22 ML/M2
LEFT ATRIUM VOLUME INDEX: 31 ML/M2
LEFT ATRIUM VOLUME MOD: 48.78 CM3
LEFT ATRIUM VOLUME: 68.77 CM3
LEFT INTERNAL DIMENSION IN SYSTOLE: 3.82 CM (ref 2.1–4)
LEFT VENTRICLE DIASTOLIC VOLUME INDEX: 49.33 ML/M2
LEFT VENTRICLE DIASTOLIC VOLUME: 109.52 ML
LEFT VENTRICLE MASS INDEX: 73 G/M2
LEFT VENTRICLE SYSTOLIC VOLUME INDEX: 28.2 ML/M2
LEFT VENTRICLE SYSTOLIC VOLUME: 62.63 ML
LEFT VENTRICULAR INTERNAL DIMENSION IN DIASTOLE: 4.84 CM (ref 3.5–6)
LEFT VENTRICULAR MASS: 162.15 G
LV LATERAL E/E' RATIO: 13.83 M/S
LV SEPTAL E/E' RATIO: 16.6 M/S
MV PEAK A VEL: 0.85 M/S
MV PEAK E VEL: 0.83 M/S
MV STENOSIS PRESSURE HALF TIME: 97.48 MS
MV VALVE AREA P 1/2 METHOD: 2.26 CM2
OHS CV AAA LARGEST SIZE: 3.28 CM
PISA TR MAX VEL: 2.13 M/S
PULM VEIN S/D RATIO: 1.35
PV PEAK D VEL: 0.43 M/S
PV PEAK S VEL: 0.58 M/S
RA MAJOR: 5.76 CM
RA PRESSURE: 3 MMHG
RA WIDTH: 3.03 CM
RIGHT VENTRICULAR END-DIASTOLIC DIMENSION: 2.59 CM
SINUS: 3.92 CM
STJ: 3.63 CM
TDI LATERAL: 0.06 M/S
TDI SEPTAL: 0.05 M/S
TDI: 0.06 M/S
TR MAX PG: 18 MMHG
TRICUSPID ANNULAR PLANE SYSTOLIC EXCURSION: 1.87 CM
TV REST PULMONARY ARTERY PRESSURE: 21 MMHG

## 2022-10-18 PROCEDURE — 93978 CV US ABDOMINAL AORTA EVALUATION (CUPID ONLY): ICD-10-PCS | Mod: 26,,, | Performed by: INTERNAL MEDICINE

## 2022-10-18 PROCEDURE — 93306 TTE W/DOPPLER COMPLETE: CPT | Mod: 26,,, | Performed by: INTERNAL MEDICINE

## 2022-10-18 PROCEDURE — 93978 VASCULAR STUDY: CPT

## 2022-10-18 PROCEDURE — 93306 ECHO (CUPID ONLY): ICD-10-PCS | Mod: 26,,, | Performed by: INTERNAL MEDICINE

## 2022-10-18 PROCEDURE — 93306 TTE W/DOPPLER COMPLETE: CPT

## 2022-10-18 PROCEDURE — 93978 VASCULAR STUDY: CPT | Mod: 26,,, | Performed by: INTERNAL MEDICINE

## 2022-10-19 ENCOUNTER — TELEPHONE (OUTPATIENT)
Dept: CARDIOLOGY | Facility: CLINIC | Age: 77
End: 2022-10-19
Payer: MEDICARE

## 2022-10-19 ENCOUNTER — PATIENT MESSAGE (OUTPATIENT)
Dept: CARDIOLOGY | Facility: CLINIC | Age: 77
End: 2022-10-19
Payer: MEDICARE

## 2022-10-19 NOTE — TELEPHONE ENCOUNTER
----- Message from Promise Casanova MA sent at 10/19/2022 11:07 AM CDT -----  Sandra the patient is returning your phone called please call [phone]. Thank you

## 2022-11-14 ENCOUNTER — PATIENT MESSAGE (OUTPATIENT)
Dept: RADIATION ONCOLOGY | Facility: CLINIC | Age: 77
End: 2022-11-14
Payer: MEDICARE

## 2022-11-15 DIAGNOSIS — C61 PROSTATE CANCER: Primary | ICD-10-CM

## 2022-11-28 ENCOUNTER — LAB VISIT (OUTPATIENT)
Dept: LAB | Facility: HOSPITAL | Age: 77
End: 2022-11-28
Attending: RADIOLOGY
Payer: MEDICARE

## 2022-11-28 DIAGNOSIS — C61 PROSTATE CANCER: ICD-10-CM

## 2022-11-28 LAB — COMPLEXED PSA SERPL-MCNC: 0.87 NG/ML (ref 0–4)

## 2022-11-28 PROCEDURE — 84153 ASSAY OF PSA TOTAL: CPT | Performed by: RADIOLOGY

## 2022-11-28 PROCEDURE — 36415 COLL VENOUS BLD VENIPUNCTURE: CPT | Mod: PN | Performed by: RADIOLOGY

## 2022-12-01 ENCOUNTER — OFFICE VISIT (OUTPATIENT)
Dept: RADIATION ONCOLOGY | Facility: CLINIC | Age: 77
End: 2022-12-01
Payer: MEDICARE

## 2022-12-01 VITALS
RESPIRATION RATE: 16 BRPM | OXYGEN SATURATION: 99 % | WEIGHT: 213.38 LBS | HEART RATE: 53 BPM | HEIGHT: 73 IN | TEMPERATURE: 98 F | BODY MASS INDEX: 28.28 KG/M2 | SYSTOLIC BLOOD PRESSURE: 132 MMHG | DIASTOLIC BLOOD PRESSURE: 62 MMHG

## 2022-12-01 DIAGNOSIS — C61 PROSTATE CANCER: Primary | ICD-10-CM

## 2022-12-01 PROCEDURE — 99214 OFFICE O/P EST MOD 30 MIN: CPT | Mod: PBBFAC,PN | Performed by: RADIOLOGY

## 2022-12-01 PROCEDURE — 99999 PR PBB SHADOW E&M-EST. PATIENT-LVL IV: CPT | Mod: PBBFAC,,, | Performed by: RADIOLOGY

## 2022-12-01 PROCEDURE — 99214 OFFICE O/P EST MOD 30 MIN: CPT | Mod: S$PBB,,, | Performed by: RADIOLOGY

## 2022-12-01 PROCEDURE — 99999 PR PBB SHADOW E&M-EST. PATIENT-LVL IV: ICD-10-PCS | Mod: PBBFAC,,, | Performed by: RADIOLOGY

## 2022-12-01 PROCEDURE — 99214 PR OFFICE/OUTPT VISIT, EST, LEVL IV, 30-39 MIN: ICD-10-PCS | Mod: S$PBB,,, | Performed by: RADIOLOGY

## 2022-12-01 NOTE — PROGRESS NOTES
[facility] Department of Radiation Oncology  Follow Up Visit Note    Diagnosis:  Peter Caldwell is a 77 y.o. 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  He completed a course of radiation therapy on 10/5/22 to 79.2Gy (due to gland 120cc, decreased from 159 with 6 months pre-medication with ADT).      Oncologic History:  Oncology History   Prostate cancer   3/12/2020 Initial Diagnosis    Prostate cancer     8/20/2021 Cancer Staged    Staging form: Prostate, AJCC 8th Edition  - Clinical stage from 8/20/2021: Stage IIB (cT1c, cN0, cM0, PSA: 9.8, Grade Group: 2)       9/2/2022 - 10/5/2022 Radiation Therapy    Treating physician: Rani Rodriguez  Total Dose: 79.2 Gy to the prostate  Fractions: 44    Treatment Site Ref. ID Energy Dose/Fx (Gy) #Fx Dose Correction (Gy) Total Dose (Gy) Start Date End Date Elapsed Days   IMRT Prost PTV 6X 1.8 44 / 44 0 79.2 8/2/2022 10/5/2022 64           Interval History  The patient presents today for a regularly scheduled follow up visit.  He was last seen in our clinic on 10/5/22 at completion of treatment.   Since that time, interval PSA on 11/28/22 was 0.87 (corrected to 1.74 for dutasteride).       Latest Reference Range & Units 09/14/20 10:15 03/10/21 09:48 12/07/21 10:02 11/28/22 11:50   PSA Diagnostic 0.00 - 4.00 ng/mL 6.8 (H) 9.8 (H) 9.8 (H) 0.87   (H): Data is abnormally high    Since completing treatment, his urinary symptoms/LUTS have significantly improved. AUA today 7.  Nocturia x 1. Bowel movements regular.  No hot flashes    Review of Systems   Review of Systems   Constitutional:  Positive for malaise/fatigue (stable). Negative for chills and fever.   Gastrointestinal:  Negative for abdominal pain, constipation and diarrhea.   Genitourinary:  Positive for frequency. Negative for dysuria and hematuria.   Musculoskeletal: Negative.    Neurological:  Negative for dizziness and headaches.    Psychiatric/Behavioral:  The patient is nervous/anxious.      Social History:  Social History     Tobacco Use    Smoking status: Former     Packs/day: 0.15     Types: Cigarettes     Start date:      Quit date:      Years since quittin.9    Smokeless tobacco: Never   Substance Use Topics    Alcohol use: Not Currently     Alcohol/week: 1.0 standard drink     Types: 1 Glasses of wine per week    Drug use: No       Family History:  Cancer-related family history is not on file.    Exam:  There were no vitals filed for this visit.  Constitutional: Pleasant 77 y.o. male in no acute distress.  Well nourished. Well groomed.   HEENT: Normocephalic and atraumatic   Lungs: No audible wheezing.  Normal effort.   Musculoskeletal: No gross MSK deformities. Ambulates independently  Skin: No rashes appreciated.   Psych: Alert and oriented with somewhat blunted affect.  Neuro:   Grossly normal.      Assessment:  Recovering well from acute radiation therapy-related toxicities  ECOG: (0) Fully active, able to carry on all predisease performance without restriction    Plan:  Follow up in 6 months with PSA  Consider stopping Avodart x 1 month.  If no change in LUTS, can try to stop Flomax as well.  If recurrent LUTS, restart meds.  Advised that PSA may increase slightly with d/c Avodart  Follow up with Urology as directed (not on ADT)  Due for colonoscopy- Ref to Corbin Zhao, advised to wait 3-6 months post-RT  He was given our contact information, and he was told that he could call our clinic at anytime if he has any questions or concerns.  Follow up with other providers as directed     32 minutes spent in chart/case review, face-to-face interaction, discussion with other providers, and treatment planning/coordination of care.

## 2022-12-04 ENCOUNTER — PATIENT MESSAGE (OUTPATIENT)
Dept: FAMILY MEDICINE | Facility: CLINIC | Age: 77
End: 2022-12-04
Payer: MEDICARE

## 2022-12-04 DIAGNOSIS — R19.4 CHANGE IN BOWEL HABIT: Primary | ICD-10-CM

## 2022-12-06 NOTE — TELEPHONE ENCOUNTER
Order needs to be for colonoscopy, not endoscopy. Corrected and I ordered it. Please talk to an Epic superuser.

## 2022-12-09 ENCOUNTER — PATIENT MESSAGE (OUTPATIENT)
Dept: UROLOGY | Facility: CLINIC | Age: 77
End: 2022-12-09
Payer: MEDICARE

## 2022-12-09 DIAGNOSIS — N40.1 BPH WITH URINARY OBSTRUCTION: ICD-10-CM

## 2022-12-09 DIAGNOSIS — N13.8 BPH WITH URINARY OBSTRUCTION: ICD-10-CM

## 2022-12-09 DIAGNOSIS — C61 PROSTATE CANCER: Primary | ICD-10-CM

## 2022-12-12 ENCOUNTER — PATIENT MESSAGE (OUTPATIENT)
Dept: FAMILY MEDICINE | Facility: CLINIC | Age: 77
End: 2022-12-12
Payer: MEDICARE

## 2022-12-12 NOTE — TELEPHONE ENCOUNTER
Patient denies any changes in bowel habits or acute concerns regarding his digestive tract. Scheduled colonoscopy with Dr. Ramos on 1/19/23. Patient agreed to procedure date. Mailed confirmation letter and prep instructions to patient address.

## 2022-12-13 ENCOUNTER — PATIENT MESSAGE (OUTPATIENT)
Dept: UROLOGY | Facility: CLINIC | Age: 77
End: 2022-12-13
Payer: MEDICARE

## 2022-12-13 RX ORDER — DUTASTERIDE 0.5 MG/1
0.5 CAPSULE, LIQUID FILLED ORAL DAILY
Qty: 90 CAPSULE | Refills: 3 | Status: SHIPPED | OUTPATIENT
Start: 2022-12-13 | End: 2022-12-13 | Stop reason: CLARIF

## 2023-01-01 ENCOUNTER — PATIENT MESSAGE (OUTPATIENT)
Dept: ADMINISTRATIVE | Facility: OTHER | Age: 78
End: 2023-01-01
Payer: MEDICARE

## 2023-01-19 ENCOUNTER — ANESTHESIA EVENT (OUTPATIENT)
Dept: ENDOSCOPY | Facility: HOSPITAL | Age: 78
End: 2023-01-19
Payer: MEDICARE

## 2023-01-19 ENCOUNTER — HOSPITAL ENCOUNTER (OUTPATIENT)
Facility: HOSPITAL | Age: 78
Discharge: HOME | End: 2023-01-19
Attending: SURGERY | Admitting: SURGERY
Payer: MEDICARE

## 2023-01-19 ENCOUNTER — ANESTHESIA (OUTPATIENT)
Dept: ENDOSCOPY | Facility: HOSPITAL | Age: 78
End: 2023-01-19
Payer: MEDICARE

## 2023-01-19 VITALS
TEMPERATURE: 98 F | HEART RATE: 51 BPM | BODY MASS INDEX: 28.23 KG/M2 | WEIGHT: 213 LBS | HEIGHT: 73 IN | DIASTOLIC BLOOD PRESSURE: 68 MMHG | SYSTOLIC BLOOD PRESSURE: 124 MMHG | RESPIRATION RATE: 14 BRPM | OXYGEN SATURATION: 96 %

## 2023-01-19 DIAGNOSIS — Z86.010 HISTORY OF COLON POLYPS: Primary | ICD-10-CM

## 2023-01-19 PROCEDURE — 63600175 PHARM REV CODE 636 W HCPCS: Mod: PO | Performed by: SURGERY

## 2023-01-19 PROCEDURE — D9220A PRA ANESTHESIA: ICD-10-PCS | Mod: PT,ANES,, | Performed by: ANESTHESIOLOGY

## 2023-01-19 PROCEDURE — 88305 TISSUE EXAM BY PATHOLOGIST: CPT | Mod: 26,,, | Performed by: PATHOLOGY

## 2023-01-19 PROCEDURE — 27201012 HC FORCEPS, HOT/COLD, DISP: Mod: PO | Performed by: SURGERY

## 2023-01-19 PROCEDURE — 27201089 HC SNARE, DISP (ANY): Mod: PO | Performed by: SURGERY

## 2023-01-19 PROCEDURE — 45385 COLONOSCOPY W/LESION REMOVAL: CPT | Mod: PT,,, | Performed by: SURGERY

## 2023-01-19 PROCEDURE — 88305 TISSUE EXAM BY PATHOLOGIST: ICD-10-PCS | Mod: 26,,, | Performed by: PATHOLOGY

## 2023-01-19 PROCEDURE — D9220A PRA ANESTHESIA: Mod: PT,ANES,, | Performed by: ANESTHESIOLOGY

## 2023-01-19 PROCEDURE — 88305 TISSUE EXAM BY PATHOLOGIST: CPT | Performed by: PATHOLOGY

## 2023-01-19 PROCEDURE — D9220A PRA ANESTHESIA: ICD-10-PCS | Mod: PT,CRNA,, | Performed by: NURSE ANESTHETIST, CERTIFIED REGISTERED

## 2023-01-19 PROCEDURE — 45380 COLONOSCOPY AND BIOPSY: CPT | Mod: PT,59,, | Performed by: SURGERY

## 2023-01-19 PROCEDURE — 25000003 PHARM REV CODE 250: Mod: PO | Performed by: NURSE ANESTHETIST, CERTIFIED REGISTERED

## 2023-01-19 PROCEDURE — 45380 PR COLONOSCOPY,BIOPSY: ICD-10-PCS | Mod: PT,59,, | Performed by: SURGERY

## 2023-01-19 PROCEDURE — 37000008 HC ANESTHESIA 1ST 15 MINUTES: Mod: PO | Performed by: SURGERY

## 2023-01-19 PROCEDURE — 45380 COLONOSCOPY AND BIOPSY: CPT | Mod: PT,59,PO | Performed by: SURGERY

## 2023-01-19 PROCEDURE — 37000009 HC ANESTHESIA EA ADD 15 MINS: Mod: PO | Performed by: SURGERY

## 2023-01-19 PROCEDURE — 45385 PR COLONOSCOPY,REMV LESN,SNARE: ICD-10-PCS | Mod: PT,,, | Performed by: SURGERY

## 2023-01-19 PROCEDURE — 63600175 PHARM REV CODE 636 W HCPCS: Mod: PO | Performed by: NURSE ANESTHETIST, CERTIFIED REGISTERED

## 2023-01-19 PROCEDURE — D9220A PRA ANESTHESIA: Mod: PT,CRNA,, | Performed by: NURSE ANESTHETIST, CERTIFIED REGISTERED

## 2023-01-19 PROCEDURE — 45385 COLONOSCOPY W/LESION REMOVAL: CPT | Mod: PT,PO | Performed by: SURGERY

## 2023-01-19 RX ORDER — SODIUM CHLORIDE, SODIUM LACTATE, POTASSIUM CHLORIDE, CALCIUM CHLORIDE 600; 310; 30; 20 MG/100ML; MG/100ML; MG/100ML; MG/100ML
INJECTION, SOLUTION INTRAVENOUS CONTINUOUS
Status: DISCONTINUED | OUTPATIENT
Start: 2023-01-19 | End: 2023-01-19 | Stop reason: HOSPADM

## 2023-01-19 RX ORDER — LIDOCAINE HYDROCHLORIDE 20 MG/ML
INJECTION INTRAVENOUS
Status: DISCONTINUED | OUTPATIENT
Start: 2023-01-19 | End: 2023-01-19

## 2023-01-19 RX ORDER — PROPOFOL 10 MG/ML
VIAL (ML) INTRAVENOUS
Status: DISCONTINUED | OUTPATIENT
Start: 2023-01-19 | End: 2023-01-19

## 2023-01-19 RX ADMIN — PROPOFOL 50 MG: 10 INJECTION, EMULSION INTRAVENOUS at 08:01

## 2023-01-19 RX ADMIN — SODIUM CHLORIDE, POTASSIUM CHLORIDE, SODIUM LACTATE AND CALCIUM CHLORIDE: 600; 310; 30; 20 INJECTION, SOLUTION INTRAVENOUS at 08:01

## 2023-01-19 RX ADMIN — LIDOCAINE HYDROCHLORIDE 100 MG: 20 INJECTION INTRAVENOUS at 08:01

## 2023-01-19 RX ADMIN — PROPOFOL 100 MG: 10 INJECTION, EMULSION INTRAVENOUS at 08:01

## 2023-01-19 NOTE — TRANSFER OF CARE
"Anesthesia Transfer of Care Note    Patient: Peter Caldwell    Procedure(s) Performed: Procedure(s) (LRB):  COLONOSCOPY (N/A)    Patient location: PACU    Anesthesia Type: general    Transport from OR: Transported from OR on 2-3 L/min O2 by NC with adequate spontaneous ventilation    Post pain: adequate analgesia    Post assessment: no apparent anesthetic complications and tolerated procedure well    Post vital signs: stable    Level of consciousness: sedated    Nausea/Vomiting: no nausea/vomiting    Complications: none    Transfer of care protocol was followed      Last vitals:   Visit Vitals  BP (!) 166/72 (BP Location: Right arm, Patient Position: Lying)   Pulse 67   Temp 36.7 °C (98 °F) (Skin)   Resp 18   Ht 6' 1" (1.854 m)   Wt 96.6 kg (213 lb)   SpO2 99%   BMI 28.10 kg/m²     "

## 2023-01-19 NOTE — ANESTHESIA POSTPROCEDURE EVALUATION
Anesthesia Post Evaluation    Patient: Peter Caldwell    Procedure(s) Performed: Procedure(s) (LRB):  COLONOSCOPY (N/A)    Final Anesthesia Type: general      Patient location during evaluation: PACU  Patient participation: Yes- Able to Participate  Level of consciousness: awake and alert  Post-procedure vital signs: reviewed and stable  Pain management: adequate  Airway patency: patent    PONV status at discharge: No PONV  Anesthetic complications: no      Cardiovascular status: blood pressure returned to baseline  Respiratory status: unassisted and room air  Hydration status: euvolemic  Follow-up not needed.          Vitals Value Taken Time   /68 01/19/23 0923   Temp 36.7 °C (98 °F) 01/19/23 0909   Pulse 51 01/19/23 0923   Resp 14 01/19/23 0923   SpO2 96 % 01/19/23 0923         Event Time   Out of Recovery 09:40:16         Pain/Joon Score: Joon Score: 10 (1/19/2023  9:28 AM)

## 2023-01-19 NOTE — ANESTHESIA PREPROCEDURE EVALUATION
01/19/2023  Peter Caldwell is a 77 y.o., male.      Pre-op Assessment    I have reviewed the Patient Summary Reports.     I have reviewed the Nursing Notes.       Review of Systems  Anesthesia Hx:  No problems with previous Anesthesia    Cardiovascular:   Hypertension Past MI CAD      Pulmonary:   Sleep Apnea    Psych:   Psychiatric History          Physical Exam  General: Well nourished        Anesthesia Plan  Type of Anesthesia, risks & benefits discussed:    Anesthesia Type: Gen Natural Airway  Intra-op Monitoring Plan: Standard ASA Monitors  Induction:  IV  Informed Consent: Informed consent signed with the Patient and all parties understand the risks and agree with anesthesia plan.  All questions answered.   ASA Score: 3  Day of Surgery Review of History & Physical: H&P Update referred to the surgeon/provider.    Ready For Surgery From Anesthesia Perspective.     .

## 2023-01-19 NOTE — PROGRESS NOTES
Patient's glasses walked out and given to wife in lobby. Wife was offered to come sit with patient, but she politely declined. Patient was given warm blanket and is resting comfortably awaiting start of procedure.

## 2023-01-19 NOTE — H&P
[facility] - Endoscopy  History & Physical     Subjective:     Chief Complaint/Reason for Admission: History of colon polyps      History of Present Illness:   Patient 77 y.o. male presents for surveillance cscope.  He has history of colon polyps.  He denies abd pain or changes in bowel habits.      Patient Active Problem List    Diagnosis Date Noted    Old MI (myocardial infarction) 10/07/2022    Adjustment disorder with anxiety 05/02/2022    PCO (posterior capsular opacification), right 06/12/2020    Prostate cancer 03/12/2020    PCO (posterior capsular opacification), bilateral 03/09/2020    Pseudophakia 03/09/2020    Short-term memory loss 01/07/2019    Decreased hearing of both ears 01/07/2019    Right hip pain 01/07/2019    Situational depression 01/07/2019    Nuclear sclerosis of right eye 08/03/2016    Post-operative state 07/27/2016    Senile nuclear sclerosis 07/26/2016    Nuclear sclerosis of both eyes 06/20/2016    Vitreous detachment of both eyes 06/20/2016    Refractive error 06/20/2016    AAA (abdominal aortic aneurysm)     Osteopenia     S/P CABG (coronary artery bypass graft) 08/09/2013    S/P PTCA (percutaneous transluminal coronary angioplasty) 08/09/2013    Benign essential hypertension     CAD (coronary artery disease)     Hyperlipidemia     BPH (benign prostatic hyperplasia)         Facility-Administered Medications Prior to Admission   Medication Dose Route Frequency Provider Last Rate Last Admin    LIDOcaine HCL 10 mg/ml (1%) injection 10 mL  10 mL Infiltration Once Pawel Salinas MD        LIDOcaine HCL 2% jelly   Topical (Top) Once Pawel Salinas MD         Medications Prior to Admission   Medication Sig Dispense Refill Last Dose    acetaminophen (TYLENOL) 325 MG tablet Take 325 mg by mouth every 6 (six) hours as needed for Pain.   Past Week    aspirin (ECOTRIN) 81 MG EC tablet Take 81 mg by mouth every evening. MD Sylvia Pisano, RN; DIANA DASILVA  Staff  Caller: Unspecified (Today,  2:44 PM)  As per notes no need to hold ASA.   If he can stop it that is fine. Likely less hematuria after biopsy but not required   Past Week    atorvastatin (LIPITOR) 80 MG tablet TAKE 1 TABLET(80 MG) BY MOUTH EVERY NIGHT 90 tablet 3 Past Week    EScitalopram oxalate (LEXAPRO) 10 MG tablet Take 20 mg by mouth once daily.   Past Week    ezetimibe (ZETIA) 10 mg tablet Take 1 tablet (10 mg total) by mouth once daily. 90 tablet 3 Past Week    fluticasone propionate (FLONASE) 50 mcg/actuation nasal spray SHAKE LIQUID AND USE 1 SPRAY(50 MCG) IN EACH NOSTRIL EVERY DAY 32 g 2 Past Week    ibuprofen (ADVIL,MOTRIN) 200 MG tablet Take 200 mg by mouth as needed for Pain. Hold 1 week prior to surgery   Past Week    MELATONIN ORAL Take by mouth as needed.   Past Week    metoprolol succinate (TOPROL-XL) 50 MG 24 hr tablet Take 1 tablet (50 mg total) by mouth every evening. 90 tablet 3 Past Week    multivit with minerals/lutein (MULTIVITAMIN 50 PLUS ORAL) Take by mouth.   Past Week    tamsulosin (FLOMAX) 0.4 mg Cap TAKE 1 CAPSULE(0.4 MG) BY MOUTH EVERY DAY (Patient taking differently: 0.8 mg once daily.) 90 capsule 3 Past Week     Review of patient's allergies indicates:   Allergen Reactions    Oxycodone Other (See Comments)    Penicillins      Other reaction(s): Rash    Promethazine Other (See Comments)     paranoia    Percodan [oxycodone hcl-oxycodone-asa]      Sleep / nightmare problems          Past Medical History:   Diagnosis Date    AAA (abdominal aortic aneurysm)     Arthritis     BPH (benign prostatic hyperplasia)     CAD (coronary artery disease)     khuri    Cataract     Colon polyps 2006    Depression     Ex-smoker     Hx of psychiatric care     Lexapro, valium    Hyperlipidemia     Mood disorder     chalasani    Old MI (myocardial infarction) 10/7/2022    ELINA (obstructive sleep apnea)     Osteopenia 4/15 aylin 4/20    Other insomnia 1/7/2019    Prostate cancer 3/12/2020    S/P CABG  (coronary artery bypass graft) 2013    S/P PTCA (percutaneous transluminal coronary angioplasty) 2013    Therapy       Past Surgical History:   Procedure Laterality Date    BIOPSY WITH TRANSRECTAL ULTRASOUND (TRUS) GUIDANCE N/A 2021    Procedure: BIOPSY, WITH TRANSRECTAL US GUIDANCE/ URONAV;  Surgeon: Pawel Salinas MD;  Location: [facility] OR [address] [place] [address];  Service: Urology;  Laterality: N/A;  30 min    CATARACT EXTRACTION W/  INTRAOCULAR LENS IMPLANT Left 2016    Dr. Norman    CATARACT EXTRACTION W/  INTRAOCULAR LENS IMPLANT Right 2016    Dr. Norman    CHOLECYSTECTOMY      CORONARY ANGIOPLASTY      CORONARY ARTERY BYPASS GRAFT      HERNIA REPAIR      ptca      rca      Family History   Problem Relation Age of Onset    Coronary artery disease Mother     Diabetes Mother     Heart disease Mother     Coronary artery disease Father     Heart disease Father     Heart disease Brother     Heart disease Brother     Schizophrenia Brother     Amblyopia Neg Hx     Blindness Neg Hx     Cataracts Neg Hx     Glaucoma Neg Hx     Macular degeneration Neg Hx     Retinal detachment Neg Hx     Strabismus Neg Hx       Social History     Tobacco Use    Smoking status: Former     Packs/day: 0.15     Types: Cigarettes     Start date:      Quit date:      Years since quittin.0    Smokeless tobacco: Never   Substance Use Topics    Alcohol use: Not Currently     Alcohol/week: 1.0 standard drink     Types: 1 Glasses of wine per week        Review of Systems:  A comprehensive review of systems was negative.    OBJECTIVE:     Patient Vitals for the past 8 hrs:   BP Temp Temp src Pulse Resp SpO2   23 0800 (!) 166/72 98 °F (36.7 °C) Skin 67 18 99 %     AAOx3  CTA B  Soft/nt/nd      Data Review:      ASSESSMENT/PLAN:     There are no hospital problems to display for this patient.  Surveillance cscope    Plan:  To have cscope today

## 2023-01-19 NOTE — PROVATION PATIENT INSTRUCTIONS
Discharge Summary/Instructions after an Endoscopic Procedure  Patient Name: Peter Caldwell  Patient MRN: [id number]  Patient YOB: 1945 Thursday, January 19, 2023  Zachery Ramos MD  Dear patient,  As a result of recent federal legislation (The Federal Cures Act), you may   receive lab or pathology results from your procedure in your [facility]   account before your physician is able to contact you. Your physician or   their representative will relay the results to you with their   recommendations at their soonest availability.  Thank you,  RESTRICTIONS:  During your procedure today, you received medications for sedation.  These   medications may affect your judgment, balance and coordination.  Therefore,   for 24 hours, you have the following restrictions:   - DO NOT drive a car, operate machinery, make legal/financial decisions,   sign important papers or drink alcohol.    ACTIVITY:  Today: no heavy lifting, straining or running due to procedural   sedation/anesthesia.  The following day: return to full activity including work.  DIET:  Eat and drink normally unless instructed otherwise.     TREATMENT FOR COMMON SIDE EFFECTS:  - Mild abdominal pain, nausea, belching, bloating or excessive gas:  rest,   eat lightly and use a heating pad.  - Sore Throat: treat with throat lozenges and/or gargle with warm salt   water.  - Because air was used during the procedure, expelling large amounts of air   from your rectum or belching is normal.  - If a bowel prep was taken, you may not have a bowel movement for 1-3 days.    This is normal.  SYMPTOMS TO WATCH FOR AND REPORT TO YOUR PHYSICIAN:  1. Abdominal pain or bloating, other than gas cramps.  2. Chest pain.  3. Back pain.  4. Signs of infection such as: chills or fever occurring within 24 hours   after the procedure.  5. Rectal bleeding, which would show as bright red, maroon, or black stools.   (A tablespoon of blood from the rectum is not serious, especially  if   hemorrhoids are present.)  6. Vomiting.  7. Weakness or dizziness.  GO DIRECTLY TO THE NEAREST EMERGENCY ROOM IF YOU HAVE ANY OF THE FOLLOWING:      Difficulty breathing              Chills and/or fever over 101 F   Persistent vomiting and/or vomiting blood   Severe abdominal pain   Severe chest pain   Black, tarry stools   Bleeding- more than one tablespoon   Any other symptom or condition that you feel may need urgent attention  Your doctor recommends these additional instructions:  If any biopsies were taken, your doctors clinic will contact you in 1 to 2   weeks with any results.  You are being discharged to home.   Resume your regular diet.   Continue your present medications.   We are waiting for your pathology results.   Your physician has recommended a repeat colonoscopy in five years for   surveillance.   Return to my office as needed.  For questions, problems or results please call your physician - Zachery Ramos MD at Work:  [phone].  EMERGENCY PHONE NUMBER: [phone], LAB RESULTS: [phone]  IF A COMPLICATION OR EMERGENCY SITUATION ARISES AND YOU ARE UNABLE TO REACH   YOUR PHYSICIAN - GO DIRECTLY TO THE EMERGENCY ROOM.  ___________________________________________  Nurse Signature  ___________________________________________  Patient/Designated Responsible Party Signature  Zachery Ramos MD  1/19/2023 9:08:04 AM  This report has been verified and signed electronically.  Dear patient,  As a result of recent federal legislation (The Federal Cures Act), you may   receive lab or pathology results from your procedure in your [facility]   account before your physician is able to contact you. Your physician or   their representative will relay the results to you with their   recommendations at their soonest availability.  Thank you.  PROVATION

## 2023-01-24 LAB
FINAL PATHOLOGIC DIAGNOSIS: NORMAL
GROSS: NORMAL
Lab: NORMAL

## 2023-01-26 ENCOUNTER — TELEPHONE (OUTPATIENT)
Dept: SURGERY | Facility: CLINIC | Age: 78
End: 2023-01-26
Payer: MEDICARE

## 2023-01-26 NOTE — TELEPHONE ENCOUNTER
Called and reviewed pathology with pt.      1. Colon, cecum, polyp, biopsy:   - Tubular adenoma.   2. Colon hepatic flexure, polyps, biopsy:   - Tubular adenoma, multiple fragments.   3. Colon, transverse, polyp, biopsy:   - Tubular adenoma.     Recommend repeat cscope in 5 years

## 2023-03-31 ENCOUNTER — TELEPHONE (OUTPATIENT)
Dept: CARDIOLOGY | Facility: CLINIC | Age: 78
End: 2023-03-31
Payer: MEDICARE

## 2023-03-31 DIAGNOSIS — E78.00 PURE HYPERCHOLESTEROLEMIA: ICD-10-CM

## 2023-03-31 DIAGNOSIS — I25.10 CORONARY ARTERY DISEASE INVOLVING NATIVE CORONARY ARTERY OF NATIVE HEART WITHOUT ANGINA PECTORIS: Primary | ICD-10-CM

## 2023-03-31 NOTE — TELEPHONE ENCOUNTER
----- Message from Emilia Frost sent at 3/31/2023  1:13 PM CDT -----  Regarding: orders  Contact: [phone]  Type: Request Orders      Request orders for :Pt was notified of over due Lipid panel  Please contact :[phone]

## 2023-04-04 ENCOUNTER — LAB VISIT (OUTPATIENT)
Dept: LAB | Facility: HOSPITAL | Age: 78
End: 2023-04-04
Attending: INTERNAL MEDICINE
Payer: MEDICARE

## 2023-04-04 DIAGNOSIS — E78.00 PURE HYPERCHOLESTEROLEMIA: ICD-10-CM

## 2023-04-04 DIAGNOSIS — I25.10 CORONARY ARTERY DISEASE INVOLVING NATIVE CORONARY ARTERY OF NATIVE HEART WITHOUT ANGINA PECTORIS: ICD-10-CM

## 2023-04-04 LAB
ALBUMIN SERPL BCP-MCNC: 3.7 G/DL (ref 3.5–5.2)
ALP SERPL-CCNC: 55 U/L (ref 55–135)
ALT SERPL W/O P-5'-P-CCNC: 31 U/L (ref 10–44)
ANION GAP SERPL CALC-SCNC: 9 MMOL/L (ref 8–16)
AST SERPL-CCNC: 27 U/L (ref 10–40)
BASOPHILS # BLD AUTO: 0.04 K/UL (ref 0–0.2)
BASOPHILS NFR BLD: 0.8 % (ref 0–1.9)
BILIRUB SERPL-MCNC: 0.5 MG/DL (ref 0.1–1)
BUN SERPL-MCNC: 9 MG/DL (ref 8–23)
CALCIUM SERPL-MCNC: 9.5 MG/DL (ref 8.7–10.5)
CHLORIDE SERPL-SCNC: 110 MMOL/L (ref 95–110)
CHOLEST SERPL-MCNC: 122 MG/DL (ref 120–199)
CHOLEST/HDLC SERPL: 2.5 {RATIO} (ref 2–5)
CO2 SERPL-SCNC: 24 MMOL/L (ref 23–29)
CREAT SERPL-MCNC: 1 MG/DL (ref 0.5–1.4)
DIFFERENTIAL METHOD: ABNORMAL
EOSINOPHIL # BLD AUTO: 0.1 K/UL (ref 0–0.5)
EOSINOPHIL NFR BLD: 2 % (ref 0–8)
ERYTHROCYTE [DISTWIDTH] IN BLOOD BY AUTOMATED COUNT: 13.5 % (ref 11.5–14.5)
EST. GFR  (NO RACE VARIABLE): >60 ML/MIN/1.73 M^2
GLUCOSE SERPL-MCNC: 109 MG/DL (ref 70–110)
HCT VFR BLD AUTO: 43.6 % (ref 40–54)
HDLC SERPL-MCNC: 48 MG/DL (ref 40–75)
HDLC SERPL: 39.3 % (ref 20–50)
HGB BLD-MCNC: 13.9 G/DL (ref 14–18)
IMM GRANULOCYTES # BLD AUTO: 0.02 K/UL (ref 0–0.04)
IMM GRANULOCYTES NFR BLD AUTO: 0.4 % (ref 0–0.5)
LDLC SERPL CALC-MCNC: 60.4 MG/DL (ref 63–159)
LYMPHOCYTES # BLD AUTO: 1.2 K/UL (ref 1–4.8)
LYMPHOCYTES NFR BLD: 23.6 % (ref 18–48)
MCH RBC QN AUTO: 31.8 PG (ref 27–31)
MCHC RBC AUTO-ENTMCNC: 31.9 G/DL (ref 32–36)
MCV RBC AUTO: 100 FL (ref 82–98)
MONOCYTES # BLD AUTO: 0.4 K/UL (ref 0.3–1)
MONOCYTES NFR BLD: 8.9 % (ref 4–15)
NEUTROPHILS # BLD AUTO: 3.2 K/UL (ref 1.8–7.7)
NEUTROPHILS NFR BLD: 64.3 % (ref 38–73)
NONHDLC SERPL-MCNC: 74 MG/DL
NRBC BLD-RTO: 0 /100 WBC
PLATELET # BLD AUTO: 163 K/UL (ref 150–450)
PMV BLD AUTO: 10.7 FL (ref 9.2–12.9)
POTASSIUM SERPL-SCNC: 4.6 MMOL/L (ref 3.5–5.1)
PROT SERPL-MCNC: 6.3 G/DL (ref 6–8.4)
RBC # BLD AUTO: 4.37 M/UL (ref 4.6–6.2)
SODIUM SERPL-SCNC: 143 MMOL/L (ref 136–145)
TRIGL SERPL-MCNC: 68 MG/DL (ref 30–150)
WBC # BLD AUTO: 4.92 K/UL (ref 3.9–12.7)

## 2023-04-04 PROCEDURE — 85025 COMPLETE CBC W/AUTO DIFF WBC: CPT | Performed by: INTERNAL MEDICINE

## 2023-04-04 PROCEDURE — 80053 COMPREHEN METABOLIC PANEL: CPT | Performed by: INTERNAL MEDICINE

## 2023-04-04 PROCEDURE — 83695 ASSAY OF LIPOPROTEIN(A): CPT | Performed by: INTERNAL MEDICINE

## 2023-04-04 PROCEDURE — 36415 COLL VENOUS BLD VENIPUNCTURE: CPT | Mod: PO | Performed by: INTERNAL MEDICINE

## 2023-04-04 PROCEDURE — 80061 LIPID PANEL: CPT | Performed by: INTERNAL MEDICINE

## 2023-04-06 ENCOUNTER — OFFICE VISIT (OUTPATIENT)
Dept: CARDIOLOGY | Facility: CLINIC | Age: 78
End: 2023-04-06
Payer: MEDICARE

## 2023-04-06 VITALS
WEIGHT: 209 LBS | DIASTOLIC BLOOD PRESSURE: 65 MMHG | HEIGHT: 73 IN | SYSTOLIC BLOOD PRESSURE: 116 MMHG | BODY MASS INDEX: 27.7 KG/M2 | HEART RATE: 55 BPM

## 2023-04-06 DIAGNOSIS — I25.119 CORONARY ARTERY DISEASE INVOLVING NATIVE CORONARY ARTERY OF NATIVE HEART WITH ANGINA PECTORIS: Primary | ICD-10-CM

## 2023-04-06 DIAGNOSIS — E78.01 FAMILIAL HYPERCHOLESTEROLEMIA: ICD-10-CM

## 2023-04-06 DIAGNOSIS — Z95.1 S/P CABG (CORONARY ARTERY BYPASS GRAFT): ICD-10-CM

## 2023-04-06 DIAGNOSIS — Z98.61 S/P PTCA (PERCUTANEOUS TRANSLUMINAL CORONARY ANGIOPLASTY): ICD-10-CM

## 2023-04-06 DIAGNOSIS — I25.2 OLD MI (MYOCARDIAL INFARCTION): ICD-10-CM

## 2023-04-06 DIAGNOSIS — I10 BENIGN ESSENTIAL HYPERTENSION: ICD-10-CM

## 2023-04-06 DIAGNOSIS — E78.41 ELEVATED LIPOPROTEIN(A): ICD-10-CM

## 2023-04-06 LAB — LPA SERPL-MCNC: 71 MG/DL (ref 0–30)

## 2023-04-06 PROCEDURE — 99999 PR PBB SHADOW E&M-EST. PATIENT-LVL III: CPT | Mod: PBBFAC,,, | Performed by: INTERNAL MEDICINE

## 2023-04-06 PROCEDURE — 99213 OFFICE O/P EST LOW 20 MIN: CPT | Mod: PBBFAC | Performed by: INTERNAL MEDICINE

## 2023-04-06 PROCEDURE — 99214 PR OFFICE/OUTPT VISIT, EST, LEVL IV, 30-39 MIN: ICD-10-PCS | Mod: S$PBB,,, | Performed by: INTERNAL MEDICINE

## 2023-04-06 PROCEDURE — 99999 PR PBB SHADOW E&M-EST. PATIENT-LVL III: ICD-10-PCS | Mod: PBBFAC,,, | Performed by: INTERNAL MEDICINE

## 2023-04-06 PROCEDURE — 99214 OFFICE O/P EST MOD 30 MIN: CPT | Mod: S$PBB,,, | Performed by: INTERNAL MEDICINE

## 2023-04-06 RX ORDER — SERTRALINE HYDROCHLORIDE 50 MG/1
100 TABLET, FILM COATED ORAL DAILY
COMMUNITY
Start: 2023-03-27 | End: 2023-07-25 | Stop reason: DRUGHIGH

## 2023-04-06 RX ORDER — TRAZODONE HYDROCHLORIDE 50 MG/1
0.5 TABLET ORAL NIGHTLY PRN
COMMUNITY
Start: 2023-03-27 | End: 2023-10-24

## 2023-04-06 NOTE — PROGRESS NOTES
Subjective:   04/06/2023     Patient ID:  Peter Caldwell is a 77 y.o. male who presents for evaulation of Follow-up (F/U WITH LABS)        Patient here for assumption of cardiac follow-up.  Does have coronary artery disease and is status post coronary artery bypass grafting.  Echocardiography since then has shown evidence for anteroapical hypokinesis with mildly decreased systolic function.    Hypertension is present, currently treated with metoprolol.      Hypercholesterolemia is present.  In February of 2022, his LDL cholesterol 71 on atorvastatin 80 mg daily.  Subsequently, ezetimibe 10 mg daily was added to his medications.  On 04/04/2023, his total cholesterol was 122, HDL 48, LDL 60 and triglycerides 68.      He was noted to have a elevated lipoprotein small a of 71, upper limit of normal is 30.    In 2005, his LDL cholesterol is 192, familial hypercholesterolemia appears to be present     He has had recent episodes of chest pain, not actually with activity, but after activity.      Past Medical History:   Diagnosis Date    AAA (abdominal aortic aneurysm)     Arthritis     BPH (benign prostatic hyperplasia)     Cataract     Colon polyps 2006    Depression     Ex-smoker      of psychiatric care     Lexapro, valium    Mood disorder     zuriasacatarina    Old MI (myocardial infarction) 10/7/2022    ELINA (obstructive sleep apnea)     Osteopenia 4/15 aylin 4/20    Other insomnia 1/7/2019    Prostate cancer 3/12/2020    S/P CABG (coronary artery bypass graft) 8/9/2013    S/P PTCA (percutaneous transluminal coronary angioplasty) 8/9/2013    Therapy        Review of patient's allergies indicates:   Allergen Reactions    Oxycodone Other (See Comments)    Penicillins      Other reaction(s): Rash    Promethazine Other (See Comments)     paranoia    Percodan [oxycodone hcl-oxycodone-asa]      Sleep / nightmare problems           Current Outpatient Medications:     acetaminophen (TYLENOL) 325 MG tablet, Take 325 mg by mouth  every 6 (six) hours as needed for Pain., Disp: , Rfl:     aspirin (ECOTRIN) 81 MG EC tablet, Take 81 mg by mouth every evening. MD Sylvia Pisano, RN; DIANA DASILVA Staff Caller: Unspecified (Today,  2:44 PM) As per notes no need to hold ASA.  If he can stop it that is fine. Likely less hematuria after biopsy but not required, Disp: , Rfl:     atorvastatin (LIPITOR) 80 MG tablet, TAKE 1 TABLET(80 MG) BY MOUTH EVERY NIGHT, Disp: 90 tablet, Rfl: 3    EScitalopram oxalate (LEXAPRO) 10 MG tablet, Take 20 mg by mouth once daily., Disp: , Rfl:     ezetimibe (ZETIA) 10 mg tablet, Take 1 tablet (10 mg total) by mouth once daily., Disp: 90 tablet, Rfl: 3    fluticasone propionate (FLONASE) 50 mcg/actuation nasal spray, SHAKE LIQUID AND USE 1 SPRAY(50 MCG) IN EACH NOSTRIL EVERY DAY, Disp: 32 g, Rfl: 2    ibuprofen (ADVIL,MOTRIN) 200 MG tablet, Take 200 mg by mouth as needed for Pain. Hold 1 week prior to surgery, Disp: , Rfl:     MELATONIN ORAL, Take by mouth as needed., Disp: , Rfl:     metoprolol succinate (TOPROL-XL) 50 MG 24 hr tablet, Take 1 tablet (50 mg total) by mouth every evening., Disp: 90 tablet, Rfl: 3    multivit with minerals/lutein (MULTIVITAMIN 50 PLUS ORAL), Take by mouth., Disp: , Rfl:     sertraline (ZOLOFT) 50 MG tablet, 25 mg., Disp: , Rfl:     tamsulosin (FLOMAX) 0.4 mg Cap, TAKE 1 CAPSULE(0.4 MG) BY MOUTH EVERY DAY, Disp: 90 capsule, Rfl: 3    traZODone (DESYREL) 50 MG tablet, Take 0.5 tablets by mouth every evening., Disp: , Rfl:     Current Facility-Administered Medications:     LIDOcaine HCL 10 mg/ml (1%) injection 10 mL, 10 mL, Infiltration, Once, Pawel Salinas MD    LIDOcaine HCL 2% jelly, , Topical (Top), Once, Pawel Salinas MD     Objective:   Review of Systems   Constitutional: Positive for malaise/fatigue and weight gain.   Cardiovascular:  Positive for chest pain. Negative for claudication, cyanosis, dyspnea on exertion, irregular heartbeat, leg swelling,  near-syncope, orthopnea, palpitations, paroxysmal nocturnal dyspnea and syncope.   Neurological:  Positive for excessive daytime sleepiness.   Psychiatric/Behavioral:  Positive for depression. The patient is nervous/anxious.        Vitals:    04/06/23 1435   BP: 116/65   Pulse: (!) 55       Wt Readings from Last 3 Encounters:   04/06/23 94.8 kg (208 lb 15.9 oz)   01/13/23 96.6 kg (213 lb)   12/01/22 96.8 kg (213 lb 6.5 oz)     Temp Readings from Last 3 Encounters:   01/19/23 98 °F (36.7 °C) (Skin)   12/01/22 98.1 °F (36.7 °C)   02/01/22 98.1 °F (36.7 °C) (Temporal)     BP Readings from Last 3 Encounters:   04/06/23 116/65   01/19/23 124/68   12/01/22 132/62     Pulse Readings from Last 3 Encounters:   04/06/23 (!) 55   01/19/23 (!) 51   12/01/22 (!) 53             Physical Exam  Vitals reviewed.   Constitutional:       General: He is not in acute distress.     Appearance: He is well-developed.   HENT:      Head: Normocephalic and atraumatic.      Nose: Nose normal.   Eyes:      Conjunctiva/sclera: Conjunctivae normal.      Pupils: Pupils are equal, round, and reactive to light.   Neck:      Vascular: No carotid bruit or JVD.   Cardiovascular:      Rate and Rhythm: Normal rate and regular rhythm.      Pulses: Intact distal pulses.      Heart sounds: Normal heart sounds. No murmur heard.    No friction rub. No gallop.   Pulmonary:      Effort: Pulmonary effort is normal. No respiratory distress.      Breath sounds: Normal breath sounds. No wheezing or rales.   Chest:      Chest wall: No tenderness.   Abdominal:      General: Bowel sounds are normal. There is no distension.      Palpations: Abdomen is soft.      Tenderness: There is no abdominal tenderness.   Musculoskeletal:         General: No tenderness or deformity. Normal range of motion.      Cervical back: Normal range of motion and neck supple.      Right lower leg: No edema.      Left lower leg: No edema.   Skin:     General: Skin is warm and dry.       Findings: No erythema or rash.   Neurological:      Mental Status: He is alert and oriented to person, place, and time.      Cranial Nerves: No cranial nerve deficit.      Motor: No abnormal muscle tone.      Coordination: Coordination normal.   Psychiatric:         Behavior: Behavior normal.         Thought Content: Thought content normal.         Judgment: Judgment normal.         Lab Results   Component Value Date    CHOL 122 04/04/2023    CHOL 142 02/25/2022    CHOL 131 08/11/2021     Lab Results   Component Value Date    HDL 48 04/04/2023    HDL 47 02/25/2022    HDL 44 08/11/2021     Lab Results   Component Value Date    LDLCALC 60.4 (L) 04/04/2023    LDLCALC 70.8 02/25/2022    LDLCALC 67.2 08/11/2021     Lab Results   Component Value Date    ALT 31 04/04/2023    AST 27 04/04/2023    AST 21 02/25/2022    AST 23 08/11/2021     Lab Results   Component Value Date    CREATININE 1.0 04/04/2023    BUN 9 04/04/2023     04/04/2023    K 4.6 04/04/2023    CO2 24 04/04/2023    CO2 28 02/25/2022    CO2 30 (H) 08/11/2021     Lab Results   Component Value Date    HGB 13.9 (L) 04/04/2023    HCT 43.6 04/04/2023    HCT 46.9 05/10/2021    HCT 46.0 06/02/2009                         Assessment and Plan:     Coronary artery disease involving native coronary artery of native heart with angina pectoris  Comments:  Will obtain a nuclear stress test  Orders:  -     Nuclear Stress - Cardiology Interpreted; Future; Expected date: 04/07/2023    Benign essential hypertension  Comments:  Well controlled    Old MI (myocardial infarction)    Familial hypercholesterolemia  Comments:  Last LDL 60, continue high-dose statin plus ezetimibe    Elevated lipoprotein(a)  Comments:  No specific therapy available    S/P CABG (coronary artery bypass graft)  -     Nuclear Stress - Cardiology Interpreted; Future; Expected date: 04/07/2023    S/P PTCA (percutaneous transluminal coronary angioplasty)  -     Nuclear Stress - Cardiology Interpreted;  Future; Expected date: 04/07/2023         Follow up in about 1 year (around 4/6/2024).          Future Appointments   Date Time Provider Department Center   6/1/2023 10:15 AM LAB, [facility]   6/6/2023 10:00 AM Rani Rodriguez MD [facility]ON [facility]

## 2023-04-24 ENCOUNTER — HOSPITAL ENCOUNTER (OUTPATIENT)
Dept: CARDIOLOGY | Facility: HOSPITAL | Age: 78
Discharge: HOME | End: 2023-04-24
Attending: INTERNAL MEDICINE
Payer: MEDICARE

## 2023-04-24 ENCOUNTER — PATIENT MESSAGE (OUTPATIENT)
Dept: CARDIOLOGY | Facility: CLINIC | Age: 78
End: 2023-04-24
Payer: MEDICARE

## 2023-04-24 ENCOUNTER — TELEPHONE (OUTPATIENT)
Dept: CARDIOLOGY | Facility: CLINIC | Age: 78
End: 2023-04-24
Payer: MEDICARE

## 2023-04-24 VITALS
WEIGHT: 208 LBS | DIASTOLIC BLOOD PRESSURE: 74 MMHG | HEART RATE: 71 BPM | HEIGHT: 73 IN | BODY MASS INDEX: 27.57 KG/M2 | SYSTOLIC BLOOD PRESSURE: 144 MMHG

## 2023-04-24 DIAGNOSIS — Z95.1 S/P CABG (CORONARY ARTERY BYPASS GRAFT): ICD-10-CM

## 2023-04-24 DIAGNOSIS — I25.119 CORONARY ARTERY DISEASE INVOLVING NATIVE CORONARY ARTERY OF NATIVE HEART WITH ANGINA PECTORIS: ICD-10-CM

## 2023-04-24 DIAGNOSIS — Z98.61 S/P PTCA (PERCUTANEOUS TRANSLUMINAL CORONARY ANGIOPLASTY): ICD-10-CM

## 2023-04-24 LAB
CV STRESS BASE HR: 71 BPM
DIASTOLIC BLOOD PRESSURE: 74 MMHG
EJECTION FRACTION- HIGH: 59 %
END DIASTOLIC INDEX-HIGH: 155 ML/M2
END DIASTOLIC INDEX-LOW: 91 ML/M2
END SYSTOLIC INDEX-HIGH: 78 ML/M2
END SYSTOLIC INDEX-LOW: 40 ML/M2
NUC REST DIASTOLIC VOLUME INDEX: 108
NUC REST EJECTION FRACTION: 45
NUC REST SYSTOLIC VOLUME INDEX: 59
NUC STRESS DIASTOLIC VOLUME INDEX: 93
NUC STRESS EJECTION FRACTION: 55 %
NUC STRESS SYSTOLIC VOLUME INDEX: 42
OHS CV CPX 85 PERCENT MAX PREDICTED HEART RATE MALE: 122
OHS CV CPX ESTIMATED METS: 8
OHS CV CPX MAX PREDICTED HEART RATE: 143
OHS CV CPX PATIENT IS FEMALE: 0
OHS CV CPX PATIENT IS MALE: 1
OHS CV CPX PEAK DIASTOLIC BLOOD PRESSURE: 63 MMHG
OHS CV CPX PEAK HEAR RATE: 127 BPM
OHS CV CPX PEAK RATE PRESSURE PRODUCT: NORMAL
OHS CV CPX PEAK SYSTOLIC BLOOD PRESSURE: 147 MMHG
OHS CV CPX PERCENT MAX PREDICTED HEART RATE ACHIEVED: 89
OHS CV CPX RATE PRESSURE PRODUCT PRESENTING: NORMAL
RETIRED EF AND QEF - SEE NOTES: 47 %
STRESS ECHO POST EXERCISE DUR MIN: 5 MINUTES
STRESS ECHO POST EXERCISE DUR SEC: 28 SECONDS
SYSTOLIC BLOOD PRESSURE: 144 MMHG

## 2023-04-24 PROCEDURE — A9502 TC99M TETROFOSMIN: HCPCS

## 2023-04-24 PROCEDURE — 78452 HT MUSCLE IMAGE SPECT MULT: CPT

## 2023-04-24 PROCEDURE — 93016 CV STRESS TEST SUPVJ ONLY: CPT | Mod: ,,, | Performed by: INTERNAL MEDICINE

## 2023-04-24 PROCEDURE — 93018 NUCLEAR STRESS - CARDIOLOGY INTERPRETED (CUPID ONLY): ICD-10-PCS | Mod: ,,, | Performed by: INTERNAL MEDICINE

## 2023-04-24 PROCEDURE — 78452 NUCLEAR STRESS - CARDIOLOGY INTERPRETED (CUPID ONLY): ICD-10-PCS | Mod: 26,,, | Performed by: INTERNAL MEDICINE

## 2023-04-24 PROCEDURE — 93016 NUCLEAR STRESS - CARDIOLOGY INTERPRETED (CUPID ONLY): ICD-10-PCS | Mod: ,,, | Performed by: INTERNAL MEDICINE

## 2023-04-24 PROCEDURE — 93018 CV STRESS TEST I&R ONLY: CPT | Mod: ,,, | Performed by: INTERNAL MEDICINE

## 2023-04-24 PROCEDURE — 78452 HT MUSCLE IMAGE SPECT MULT: CPT | Mod: 26,,, | Performed by: INTERNAL MEDICINE

## 2023-04-24 NOTE — TELEPHONE ENCOUNTER
Discussed with patient, he had bypass surgery in 2005 and subsequent stent.  This surgery was done at [facility].  He did have an [facility] cardiologist, I see no records of any of these procedures though in the epic record.      I have asked the patient to retrieve records of his prior bypass surgery and angiogram.      His chest pain appears to be atypical at present., I will have him return to clinic in 3 months for review.    ELSA Sexton MD     ----- Message from Sandra Gutierrez MA sent at 4/24/2023  3:36 PM CDT -----  Regarding: FW: results    ----- Message -----  From: Keara Gould  Sent: 4/24/2023   3:31 PM CDT  To: Carlie Gold Staff  Subject: results                                          Pt is returning a call for NUC results and can be reached at [phone]    Thank you

## 2023-04-25 ENCOUNTER — PATIENT MESSAGE (OUTPATIENT)
Dept: CARDIOLOGY | Facility: CLINIC | Age: 78
End: 2023-04-25
Payer: MEDICARE

## 2023-05-09 ENCOUNTER — PATIENT MESSAGE (OUTPATIENT)
Dept: CARDIOLOGY | Facility: CLINIC | Age: 78
End: 2023-05-09
Payer: MEDICARE

## 2023-05-10 RX ORDER — FLUTICASONE PROPIONATE 50 MCG
SPRAY, SUSPENSION (ML) NASAL
Qty: 32 G | Refills: 0 | Status: SHIPPED | OUTPATIENT
Start: 2023-05-10 | End: 2023-09-07

## 2023-05-10 NOTE — TELEPHONE ENCOUNTER
No care due was identified.  [facility] Embedded Care Due Messages. Reference number: [id number].   5/10/2023 12:41:39 PM CDT

## 2023-05-10 NOTE — TELEPHONE ENCOUNTER
Refill Decision Note   Peter Caldwell  is requesting a refill authorization.  Brief Assessment and Rationale for Refill:  Approve     Medication Therapy Plan:       Medication Reconciliation Completed: No   Comments:     No Care Gaps recommended.     Note composed:1:20 PM 05/10/2023

## 2023-05-17 ENCOUNTER — PATIENT MESSAGE (OUTPATIENT)
Dept: FAMILY MEDICINE | Facility: CLINIC | Age: 78
End: 2023-05-17
Payer: MEDICARE

## 2023-05-24 ENCOUNTER — OFFICE VISIT (OUTPATIENT)
Dept: URGENT CARE | Facility: CLINIC | Age: 78
End: 2023-05-24
Payer: MEDICARE

## 2023-05-24 VITALS
HEART RATE: 62 BPM | BODY MASS INDEX: 27.57 KG/M2 | SYSTOLIC BLOOD PRESSURE: 125 MMHG | TEMPERATURE: 97 F | RESPIRATION RATE: 16 BRPM | HEIGHT: 73 IN | DIASTOLIC BLOOD PRESSURE: 79 MMHG | WEIGHT: 208 LBS | OXYGEN SATURATION: 96 %

## 2023-05-24 DIAGNOSIS — R05.9 COUGH, UNSPECIFIED TYPE: ICD-10-CM

## 2023-05-24 DIAGNOSIS — J06.9 UPPER RESPIRATORY TRACT INFECTION, UNSPECIFIED TYPE: Primary | ICD-10-CM

## 2023-05-24 LAB
CTP QC/QA: YES
SARS-COV-2 AG RESP QL IA.RAPID: NEGATIVE

## 2023-05-24 PROCEDURE — 87811 SARS CORONAVIRUS 2 ANTIGEN POCT, MANUAL READ: ICD-10-PCS | Mod: QW,S$GLB,, | Performed by: INTERNAL MEDICINE

## 2023-05-24 PROCEDURE — 99203 PR OFFICE/OUTPT VISIT, NEW, LEVL III, 30-44 MIN: ICD-10-PCS | Mod: 25,S$GLB,, | Performed by: INTERNAL MEDICINE

## 2023-05-24 PROCEDURE — 96372 THER/PROPH/DIAG INJ SC/IM: CPT | Mod: S$GLB,,, | Performed by: INTERNAL MEDICINE

## 2023-05-24 PROCEDURE — 96372 PR INJECTION,THERAP/PROPH/DIAG2ST, IM OR SUBCUT: ICD-10-PCS | Mod: S$GLB,,, | Performed by: INTERNAL MEDICINE

## 2023-05-24 PROCEDURE — 99203 OFFICE O/P NEW LOW 30 MIN: CPT | Mod: 25,S$GLB,, | Performed by: INTERNAL MEDICINE

## 2023-05-24 PROCEDURE — 87811 SARS-COV-2 COVID19 W/OPTIC: CPT | Mod: QW,S$GLB,, | Performed by: INTERNAL MEDICINE

## 2023-05-24 RX ORDER — BENZONATATE 100 MG/1
100 CAPSULE ORAL 2 TIMES DAILY PRN
Qty: 14 CAPSULE | Refills: 0 | Status: SHIPPED | OUTPATIENT
Start: 2023-05-24 | End: 2023-05-31

## 2023-05-24 RX ORDER — DEXAMETHASONE SODIUM PHOSPHATE 100 MG/10ML
10 INJECTION INTRAMUSCULAR; INTRAVENOUS
Status: COMPLETED | OUTPATIENT
Start: 2023-05-24 | End: 2023-05-24

## 2023-05-24 RX ADMIN — DEXAMETHASONE SODIUM PHOSPHATE 10 MG: 100 INJECTION INTRAMUSCULAR; INTRAVENOUS at 01:05

## 2023-05-24 NOTE — PROGRESS NOTES
"Subjective:      Patient ID: Peter Caldwell is a 77 y.o. male.    Vitals:  height is 6' 1" (1.854 m) and weight is 94.3 kg (208 lb). His oral temperature is 97 °F (36.1 °C). His blood pressure is 125/79 and his pulse is 62. His respiration is 16 and oxygen saturation is 96%.     Chief Complaint: Cough    Pt presents with cough, fatigue x 5 days. Otc meds taken with no relief and pain scale 6/10. Pt has had sick contacts    Cough  This is a new problem. The current episode started in the past 7 days. The problem has been gradually worsening. The problem occurs every few minutes. The cough is Productive of sputum. Treatments tried: anti mucus meds. The treatment provided no relief. There is no history of asthma, bronchitis, COPD or pneumonia.     Respiratory:  Positive for cough.     Objective:     Physical Exam   Constitutional: He is oriented to person, place, and time. He appears well-developed. He is cooperative.  Non-toxic appearance. He does not appear ill. No distress.   HENT:   Head: Normocephalic and atraumatic.   Ears:   Right Ear: Hearing, tympanic membrane, external ear and ear canal normal.   Left Ear: Hearing, tympanic membrane, external ear and ear canal normal.   Nose: Rhinorrhea and congestion present. No mucosal edema or nasal deformity. No epistaxis. Right sinus exhibits no maxillary sinus tenderness and no frontal sinus tenderness. Left sinus exhibits no maxillary sinus tenderness and no frontal sinus tenderness.   Mouth/Throat: Uvula is midline, oropharynx is clear and moist and mucous membranes are normal. No trismus in the jaw. Normal dentition. No uvula swelling. No oropharyngeal exudate, posterior oropharyngeal edema or posterior oropharyngeal erythema.   Eyes: Conjunctivae and lids are normal. No scleral icterus.   Neck: Trachea normal and phonation normal. Neck supple. No edema present. No erythema present. No neck rigidity present.   Cardiovascular: Normal rate, regular rhythm, normal " heart sounds and normal pulses.   Pulmonary/Chest: Effort normal and breath sounds normal. No respiratory distress. He has no decreased breath sounds. He has no rhonchi.   Abdominal: Normal appearance.   Musculoskeletal: Normal range of motion.         General: No deformity. Normal range of motion.   Neurological: He is alert and oriented to person, place, and time. He exhibits normal muscle tone. Coordination normal.   Skin: Skin is warm, dry, intact, not diaphoretic and not pale.   Psychiatric: His speech is normal and behavior is normal. Judgment and thought content normal.   Nursing note and vitals reviewed.    Assessment:     1. Upper respiratory tract infection, unspecified type    2. Cough, unspecified type        Plan:     Patient requesting a steroid shot.  Discussed the risks of having a steroid shot but patient is still requesting it.  Discussed with patient that it is a viral infection and no antibiotics recommended today    Upper respiratory tract infection, unspecified type  -     dexAMETHasone injection 10 mg  -     benzonatate (TESSALON PERLES) 100 MG capsule; Take 1 capsule (100 mg total) by mouth 2 (two) times daily as needed for Cough.  Dispense: 14 capsule; Refill: 0    Cough, unspecified type  -     SARS Coronavirus 2 Antigen, POCT Manual Read      Patient Instructions       If your condition worsens we recommend that you receive another evaluation at the emergency room immediately or contact your primary medical clinics after hours call service to discuss your concerns. You must understand that you've received an Urgent Care treatment only and that you may be released before all of your medical problems are known or treated. You, the patient, will arrange for follow up care as instructed.  Drink plenty of Fluids  Wash hands frequently using mild antibacterial soap lathering for at least 15 seconds then rinse  Get plenty of Rest  Salt water gargles  Follow up in 1-2 weeks with Primary Care  physician if not significantly better.   If you are not allergic please Tylenol every 4-6 hours as needed and/or Ibuprofen every 6-8 hours as needed, over the counter for pain or fever.  Take OTC Cough/Congestion medicine as needed. Talk to your pharmacist about the best option for you.     My notes were dictated with [employer]ency Software. Any misspellings or nonsensical grammar should be attributed to its use and allowances made for errors and typographic syntactical error(s).

## 2023-05-24 NOTE — PATIENT INSTRUCTIONS
If your condition worsens we recommend that you receive another evaluation at the emergency room immediately or contact your primary medical clinics after hours call service to discuss your concerns. You must understand that you've received an Urgent Care treatment only and that you may be released before all of your medical problems are known or treated. You, the patient, will arrange for follow up care as instructed.  Drink plenty of Fluids  Wash hands frequently using mild antibacterial soap lathering for at least 15 seconds then rinse  Get plenty of Rest  Salt water gargles  Follow up in 1-2 weeks with Primary Care physician if not significantly better.   If you are not allergic please Tylenol every 4-6 hours as needed and/or Ibuprofen every 6-8 hours as needed, over the counter for pain or fever.  Take OTC Cough/Congestion medicine as needed. Talk to your pharmacist about the best option for you.

## 2023-06-01 ENCOUNTER — LAB VISIT (OUTPATIENT)
Dept: LAB | Facility: HOSPITAL | Age: 78
End: 2023-06-01
Attending: RADIOLOGY
Payer: MEDICARE

## 2023-06-01 DIAGNOSIS — C61 PROSTATE CANCER: ICD-10-CM

## 2023-06-01 LAB — COMPLEXED PSA SERPL-MCNC: 0.63 NG/ML (ref 0–4)

## 2023-06-01 PROCEDURE — 36415 COLL VENOUS BLD VENIPUNCTURE: CPT | Mod: PN | Performed by: RADIOLOGY

## 2023-06-01 PROCEDURE — 84153 ASSAY OF PSA TOTAL: CPT | Performed by: RADIOLOGY

## 2023-06-06 ENCOUNTER — OFFICE VISIT (OUTPATIENT)
Dept: RADIATION ONCOLOGY | Facility: CLINIC | Age: 78
End: 2023-06-06
Payer: MEDICARE

## 2023-06-06 VITALS
DIASTOLIC BLOOD PRESSURE: 65 MMHG | HEIGHT: 73 IN | HEART RATE: 58 BPM | WEIGHT: 208.31 LBS | BODY MASS INDEX: 27.61 KG/M2 | SYSTOLIC BLOOD PRESSURE: 114 MMHG | OXYGEN SATURATION: 95 % | RESPIRATION RATE: 20 BRPM | TEMPERATURE: 98 F

## 2023-06-06 DIAGNOSIS — C61 PROSTATE CANCER: Primary | ICD-10-CM

## 2023-06-06 PROCEDURE — 99213 PR OFFICE/OUTPT VISIT, EST, LEVL III, 20-29 MIN: ICD-10-PCS | Mod: S$PBB,,, | Performed by: RADIOLOGY

## 2023-06-06 PROCEDURE — 99999 PR PBB SHADOW E&M-EST. PATIENT-LVL IV: ICD-10-PCS | Mod: PBBFAC,,, | Performed by: RADIOLOGY

## 2023-06-06 PROCEDURE — 99213 OFFICE O/P EST LOW 20 MIN: CPT | Mod: S$PBB,,, | Performed by: RADIOLOGY

## 2023-06-06 PROCEDURE — 99214 OFFICE O/P EST MOD 30 MIN: CPT | Mod: PBBFAC,PN | Performed by: RADIOLOGY

## 2023-06-06 PROCEDURE — 99999 PR PBB SHADOW E&M-EST. PATIENT-LVL IV: CPT | Mod: PBBFAC,,, | Performed by: RADIOLOGY

## 2023-06-06 NOTE — PROGRESS NOTES
[facility] Department of Radiation Oncology  Follow Up Visit Note    Diagnosis:  Peter Caldwell is a 77 y.o. 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  He completed a course of radiation therapy on 10/5/22 to 79.2Gy (due to gland 120cc, decreased from 159 with 6 months pre-medication with ADT).      Oncologic History:  Oncology History   Prostate cancer   3/12/2020 Initial Diagnosis    Prostate cancer     8/20/2021 Cancer Staged    Staging form: Prostate, AJCC 8th Edition  - Clinical stage from 8/20/2021: Stage IIB (cT1c, cN0, cM0, PSA: 9.8, Grade Group: 2)     9/2/2022 - 10/5/2022 Radiation Therapy    Treating physician: Rani Rodriguez  Total Dose: 79.2 Gy to the prostate  Fractions: 44    Treatment Site Ref. ID Energy Dose/Fx (Gy) #Fx Dose Correction (Gy) Total Dose (Gy) Start Date End Date Elapsed Days   IMRT Prost PTV 6X 1.8 44 / 44 0 79.2 8/2/2022 10/5/2022 64             Interval History  The patient presents today for a regularly scheduled follow up visit.  He was last seen in our clinic on 12/1/22 at completion of treatment.   Since that time, interval PSA on 6/1/23 was 0.63 (corrected to 1.23 for dutasteride).       Latest Reference Range & Units Most Recent 03/10/21 09:48 12/07/21 10:02 11/28/22 11:50 06/01/23 10:35   PSA Diagnostic 0.00 - 4.00 ng/mL 0.63  6/1/23 10:35 9.8 (H) 9.8 (H) 0.87 0.63   (H): Data is abnormally high    Since completing treatment, his urinary symptoms/LUTS have significantly improved. AUA today 6.  Nocturia x 2. Bowel movements regular.  No hot flashes    Had colonoscopy with Dr. Zhao 1/19/23.    Review of Systems   Review of Systems   Constitutional:  Positive for malaise/fatigue (stable). Negative for chills and fever.   Gastrointestinal:  Negative for abdominal pain, constipation and diarrhea.   Genitourinary:  Positive for frequency. Negative for dysuria and hematuria.   Musculoskeletal: Negative.   "  Neurological:  Negative for dizziness and headaches.   Psychiatric/Behavioral:  The patient is nervous/anxious.      Social History:  Social History     Tobacco Use    Smoking status: Former     Packs/day: 0.15     Types: Cigarettes     Start date:      Quit date:      Years since quittin.4    Smokeless tobacco: Never   Substance Use Topics    Alcohol use: Not Currently     Alcohol/week: 1.0 standard drink     Types: 1 Glasses of wine per week    Drug use: No       Family History:  Cancer-related family history is not on file.    Exam:  Vitals:    23 1355   BP: 114/65   BP Location: Right arm   Patient Position: Sitting   Pulse: (!) 58   Resp: 20   Temp: 98.2 °F (36.8 °C)   SpO2: 95%   Weight: 94.5 kg (208 lb 5.4 oz)   Height: 6' 1" (1.854 m)     Constitutional: Pleasant 77 y.o. male in no acute distress.  Well nourished. Well groomed.   HEENT: Normocephalic and atraumatic   Lungs: No audible wheezing.  Normal effort.   Musculoskeletal: No gross MSK deformities. Ambulates independently  Skin: No rashes appreciated.   Psych: Alert and oriented with somewhat blunted affect.  Neuro:   Grossly normal.      Assessment:  Recovering well from acute radiation therapy-related toxicities  ECOG: (0) Fully active, able to carry on all predisease performance without restriction    Plan:  Follow up in 6 months with PSA  Consider stopping Avodart x 1 month.  If no change in LUTS, can try to stop Flomax as well.  If recurrent LUTS, restart meds.  Advised that PSA may increase slightly with d/c Avodart  Follow up with Urology as directed (not on ADT)  He was given our contact information, and he was told that he could call our clinic at anytime if he has any questions or concerns.  Follow up with other providers as directed     32 minutes spent in chart/case review, face-to-face interaction, discussion with other providers, and treatment planning/coordination of care.         "

## 2023-07-25 ENCOUNTER — OFFICE VISIT (OUTPATIENT)
Dept: CARDIOLOGY | Facility: CLINIC | Age: 78
End: 2023-07-25
Payer: MEDICARE

## 2023-07-25 VITALS
BODY MASS INDEX: 28.02 KG/M2 | WEIGHT: 211.44 LBS | HEIGHT: 73 IN | DIASTOLIC BLOOD PRESSURE: 80 MMHG | HEART RATE: 58 BPM | SYSTOLIC BLOOD PRESSURE: 120 MMHG

## 2023-07-25 DIAGNOSIS — I71.43 INFRARENAL ABDOMINAL AORTIC ANEURYSM (AAA) WITHOUT RUPTURE: Chronic | ICD-10-CM

## 2023-07-25 DIAGNOSIS — I25.2 OLD MI (MYOCARDIAL INFARCTION): Chronic | ICD-10-CM

## 2023-07-25 DIAGNOSIS — Z95.1 S/P CABG (CORONARY ARTERY BYPASS GRAFT): Chronic | ICD-10-CM

## 2023-07-25 DIAGNOSIS — I10 BENIGN ESSENTIAL HYPERTENSION: Primary | Chronic | ICD-10-CM

## 2023-07-25 DIAGNOSIS — E78.01 FAMILIAL HYPERCHOLESTEROLEMIA: Chronic | ICD-10-CM

## 2023-07-25 DIAGNOSIS — Z98.61 S/P PTCA (PERCUTANEOUS TRANSLUMINAL CORONARY ANGIOPLASTY): Chronic | ICD-10-CM

## 2023-07-25 DIAGNOSIS — E78.41 ELEVATED LIPOPROTEIN(A): Chronic | ICD-10-CM

## 2023-07-25 DIAGNOSIS — I25.119 CORONARY ARTERY DISEASE INVOLVING NATIVE CORONARY ARTERY OF NATIVE HEART WITH ANGINA PECTORIS: Chronic | ICD-10-CM

## 2023-07-25 DIAGNOSIS — I10 BENIGN ESSENTIAL HYPERTENSION: ICD-10-CM

## 2023-07-25 PROCEDURE — 99999 PR PBB SHADOW E&M-EST. PATIENT-LVL IV: ICD-10-PCS | Mod: PBBFAC,,, | Performed by: INTERNAL MEDICINE

## 2023-07-25 PROCEDURE — 99214 OFFICE O/P EST MOD 30 MIN: CPT | Mod: PBBFAC | Performed by: INTERNAL MEDICINE

## 2023-07-25 PROCEDURE — 99214 PR OFFICE/OUTPT VISIT, EST, LEVL IV, 30-39 MIN: ICD-10-PCS | Mod: S$PBB,,, | Performed by: INTERNAL MEDICINE

## 2023-07-25 PROCEDURE — 99999 PR PBB SHADOW E&M-EST. PATIENT-LVL IV: CPT | Mod: PBBFAC,,, | Performed by: INTERNAL MEDICINE

## 2023-07-25 PROCEDURE — 99214 OFFICE O/P EST MOD 30 MIN: CPT | Mod: S$PBB,,, | Performed by: INTERNAL MEDICINE

## 2023-07-25 RX ORDER — METOPROLOL SUCCINATE 50 MG/1
25 TABLET, EXTENDED RELEASE ORAL NIGHTLY
Qty: 90 TABLET | Refills: 3 | Status: SHIPPED | OUTPATIENT
Start: 2023-07-25 | End: 2023-11-14 | Stop reason: SDUPTHER

## 2023-07-25 RX ORDER — SERTRALINE HYDROCHLORIDE 100 MG/1
100 TABLET, FILM COATED ORAL DAILY
COMMUNITY
Start: 2023-05-12 | End: 2024-01-31

## 2023-07-25 NOTE — PROGRESS NOTES
Subjective:   07/25/2023     Patient ID:  Peter Caldwell is a 77 y.o. male who presents for evaulation of Coronary Artery Disease, Old MI, and s/p CABG        Patient here for assumption of cardiac follow-up.  Does have coronary artery disease and is status post coronary artery bypass grafting.  Echocardiography since then has shown evidence for anteroapical hypokinesis with mildly decreased systolic function.    Hypertension is present, currently treated with metoprolol.  He has developed fatigue though, blood pressures have been well controlled, will decrease metoprolol    Hypercholesterolemia is present.  In February of 2022, his LDL cholesterol 71 on atorvastatin 80 mg daily.  Subsequently, ezetimibe 10 mg daily was added to his medications.  On 04/04/2023, his total cholesterol was 122, HDL 48, LDL 60 and triglycerides 68.      He was noted to have a elevated lipoprotein small a of 71, upper limit of normal is 30.    In 2005, his LDL cholesterol is 192, familial hypercholesterolemia appears to be present     He has had recent episodes of chest pain, not actually with activity, but after activity.      Past Medical History:   Diagnosis Date    AAA (abdominal aortic aneurysm)     Arthritis     BPH (benign prostatic hyperplasia)     Cataract     Colon polyps 2006    Coronary artery disease     Depression     Ex-smoker     Hx of psychiatric care     Lexapro, valium    Mood disorder     chalasacatarina    Old MI (myocardial infarction) 10/07/2022    ELINA (obstructive sleep apnea)     Osteopenia 4/15 aylin 4/20    Other insomnia 01/07/2019    Prostate cancer 03/12/2020    S/P CABG (coronary artery bypass graft) 08/09/2013    S/P PTCA (percutaneous transluminal coronary angioplasty) 08/09/2013    Skin cancer 05/10/2023    Therapy        Review of patient's allergies indicates:   Allergen Reactions    Oxycodone Other (See Comments)    Penicillins      Other reaction(s): Rash    Promethazine Other (See Comments)     paranoia     Percodan [oxycodone hcl-oxycodone-asa]      Sleep / nightmare problems           Current Outpatient Medications:     acetaminophen (TYLENOL) 325 MG tablet, Take 325 mg by mouth every 6 (six) hours as needed for Pain., Disp: , Rfl:     aspirin (ECOTRIN) 81 MG EC tablet, Take 81 mg by mouth every evening. MD Sylvia Pisano RN; DIANA DASILVA Staff Caller: Unspecified (Today,  2:44 PM) As per notes no need to hold ASA.  If he can stop it that is fine. Likely less hematuria after biopsy but not required, Disp: , Rfl:     atorvastatin (LIPITOR) 80 MG tablet, TAKE 1 TABLET(80 MG) BY MOUTH EVERY NIGHT, Disp: 90 tablet, Rfl: 3    ezetimibe (ZETIA) 10 mg tablet, Take 1 tablet (10 mg total) by mouth once daily., Disp: 90 tablet, Rfl: 3    fluticasone propionate (FLONASE) 50 mcg/actuation nasal spray, USE 1 SPRAY IN EACH NOSTRIL EVERY DAY, Disp: 32 g, Rfl: 0    ibuprofen (ADVIL,MOTRIN) 200 MG tablet, Take 200 mg by mouth as needed for Pain. Hold 1 week prior to surgery, Disp: , Rfl:     MELATONIN ORAL, Take by mouth as needed., Disp: , Rfl:     multivit with minerals/lutein (MULTIVITAMIN 50 PLUS ORAL), Take by mouth., Disp: , Rfl:     sertraline (ZOLOFT) 100 MG tablet, Take 100 mg by mouth Daily., Disp: , Rfl:     tamsulosin (FLOMAX) 0.4 mg Cap, TAKE 1 CAPSULE(0.4 MG) BY MOUTH EVERY DAY, Disp: 90 capsule, Rfl: 3    traZODone (DESYREL) 50 MG tablet, Take 0.5 tablets by mouth nightly as needed., Disp: , Rfl:     metoprolol succinate (TOPROL-XL) 50 MG 24 hr tablet, Take 0.5 tablets (25 mg total) by mouth every evening., Disp: 90 tablet, Rfl: 3    Current Facility-Administered Medications:     LIDOcaine HCL 10 mg/ml (1%) injection 10 mL, 10 mL, Infiltration, Once, Pawel Salinas MD    LIDOcaine HCL 2% jelly, , Topical (Top), Once, Pawel Salinas MD     Objective:   Review of Systems   Constitutional: Positive for malaise/fatigue and weight gain.   Cardiovascular:  Positive for chest pain.  Negative for claudication, cyanosis, dyspnea on exertion, irregular heartbeat, leg swelling, near-syncope, orthopnea, palpitations, paroxysmal nocturnal dyspnea and syncope.   Neurological:  Positive for excessive daytime sleepiness.   Psychiatric/Behavioral:  Positive for depression. The patient is nervous/anxious.        Vitals:    07/25/23 1508   BP: 125/67   Pulse: (!) 58       Wt Readings from Last 3 Encounters:   07/25/23 95.9 kg (211 lb 6.7 oz)   06/06/23 94.5 kg (208 lb 5.4 oz)   05/24/23 94.3 kg (208 lb)     Temp Readings from Last 3 Encounters:   06/06/23 98.2 °F (36.8 °C)   05/24/23 97 °F (36.1 °C) (Oral)   01/19/23 98 °F (36.7 °C) (Skin)     BP Readings from Last 3 Encounters:   07/25/23 125/67   06/06/23 114/65   05/24/23 125/79     Pulse Readings from Last 3 Encounters:   07/25/23 (!) 58   06/06/23 (!) 58   05/24/23 62             Physical Exam  Vitals reviewed.   Constitutional:       General: He is not in acute distress.     Appearance: He is well-developed.   HENT:      Head: Normocephalic and atraumatic.      Nose: Nose normal.   Eyes:      Conjunctiva/sclera: Conjunctivae normal.      Pupils: Pupils are equal, round, and reactive to light.   Neck:      Vascular: No carotid bruit or JVD.   Cardiovascular:      Rate and Rhythm: Normal rate and regular rhythm.      Pulses: Intact distal pulses.      Heart sounds: Normal heart sounds. No murmur heard.    No friction rub. No gallop.   Pulmonary:      Effort: Pulmonary effort is normal. No respiratory distress.      Breath sounds: Normal breath sounds. No wheezing or rales.   Chest:      Chest wall: No tenderness.   Abdominal:      General: Bowel sounds are normal. There is no distension.      Palpations: Abdomen is soft.      Tenderness: There is no abdominal tenderness.   Musculoskeletal:         General: No tenderness or deformity. Normal range of motion.      Cervical back: Normal range of motion and neck supple.      Right lower leg: No edema.       Left lower leg: No edema.   Skin:     General: Skin is warm and dry.      Findings: No erythema or rash.   Neurological:      Mental Status: He is alert and oriented to person, place, and time.      Cranial Nerves: No cranial nerve deficit.      Motor: No abnormal muscle tone.      Coordination: Coordination normal.   Psychiatric:         Behavior: Behavior normal.         Thought Content: Thought content normal.         Judgment: Judgment normal.         Lab Results   Component Value Date    CHOL 122 04/04/2023    CHOL 142 02/25/2022    CHOL 131 08/11/2021     Lab Results   Component Value Date    HDL 48 04/04/2023    HDL 47 02/25/2022    HDL 44 08/11/2021     Lab Results   Component Value Date    LDLCALC 60.4 (L) 04/04/2023    LDLCALC 70.8 02/25/2022    LDLCALC 67.2 08/11/2021     Lab Results   Component Value Date    ALT 31 04/04/2023    AST 27 04/04/2023    AST 21 02/25/2022    AST 23 08/11/2021     Lab Results   Component Value Date    CREATININE 1.0 04/04/2023    BUN 9 04/04/2023     04/04/2023    K 4.6 04/04/2023    CO2 24 04/04/2023    CO2 28 02/25/2022    CO2 30 (H) 08/11/2021     Lab Results   Component Value Date    HGB 13.9 (L) 04/04/2023    HCT 43.6 04/04/2023    HCT 46.9 05/10/2021    HCT 46.0 06/02/2009                         Assessment and Plan:     Benign essential hypertension  Comments:  Fatigue on metoprolol, decrease, continue follow-up in digital Medicine, if blood pressures become elevated, add losartan  Orders:  -     metoprolol succinate (TOPROL-XL) 50 MG 24 hr tablet; Take 0.5 tablets (25 mg total) by mouth every evening.  Dispense: 90 tablet; Refill: 3  -     Echo; Future; Expected date: 11/01/2023    Coronary artery disease involving native coronary artery of native heart with angina pectoris  Comments:  Asymptomatic    Familial hypercholesterolemia  Comments:  On high-dose statin therapy plus ezetimibe    S/P CABG (coronary artery bypass graft)    S/P PTCA (percutaneous  transluminal coronary angioplasty)    Old MI (myocardial infarction)    Elevated lipoprotein(a)    Infrarenal abdominal aortic aneurysm (AAA) without rupture    Benign essential hypertension  Comments:  Well controlled only on metoprolol  Orders:  -     metoprolol succinate (TOPROL-XL) 50 MG 24 hr tablet; Take 0.5 tablets (25 mg total) by mouth every evening.  Dispense: 90 tablet; Refill: 3  -     Echo; Future; Expected date: 11/01/2023         Follow up in about 4 months (around 11/9/2023).          Future Appointments   Date Time Provider Department Center   12/7/2023  1:05 PM LAB, [facility]   12/12/2023  1:00 PM Rani Rodriguez MD [facility] at [facility]

## 2023-08-09 ENCOUNTER — PES CALL (OUTPATIENT)
Dept: ADMINISTRATIVE | Facility: CLINIC | Age: 78
End: 2023-08-09
Payer: MEDICARE

## 2023-08-25 ENCOUNTER — TELEPHONE (OUTPATIENT)
Dept: FAMILY MEDICINE | Facility: CLINIC | Age: 78
End: 2023-08-25

## 2023-08-25 NOTE — TELEPHONE ENCOUNTER
----- Message from Gisell Crystal sent at 8/25/2023  8:51 AM CDT -----  Regarding: Request Annual with Lab work  Patient would like to see Dr. Delaney for his annual and lab work on the same day as his eAWV visit on 10/24.Please call him at [phone].

## 2023-09-25 DIAGNOSIS — E78.00 PURE HYPERCHOLESTEROLEMIA: ICD-10-CM

## 2023-09-25 RX ORDER — EZETIMIBE 10 MG/1
10 TABLET ORAL DAILY
Qty: 90 TABLET | Refills: 3 | Status: SHIPPED | OUTPATIENT
Start: 2023-09-25 | End: 2024-09-24

## 2023-10-03 NOTE — PROGRESS NOTES
Digital Medicine: Health  Follow-Up    The history is provided by the patient.             Reason for review: Blood pressure at goal      Additional Follow-up details: BP consistently at goal- 121/74. Patient reports he is well, no complaints. Denies symptoms of hypertension- HA, chest pains and SOB.     Discussed COVID-19 and importance of proper hand hygiene and social distancing.     Patient denies further needs from the program today. Phone call brief, patient denies needs today.           Diet-Not assessed          Physical Activity-Not assessed    Medication Adherence-Medication adherence was assessed.        Substance, Sleep, Stress-No change  stress-  Details:  Intervention(s):    Sleep-  Details:  Intervention(s):    Alcohol -  Details:  Intervention(s):    Tobacco-  Details:  Intervention(s):          Continue current diet/physical activity routine.  Provided patient education.       Addressed patient questions and patient has my contact information if needed prior to next outreach. Patient verbalizes understanding.      Explained the importance of self-monitoring and medication adherence. Encouraged the patient to communicate with their health  for lifestyle modifications to help improve or maintain a healthy lifestyle.            There are no preventive care reminders to display for this patient.    Last 5 Patient Entered Readings                                      Current 30 Day Average: 121/74     Recent Readings 9/27/2020 9/24/2020 9/24/2020 9/24/2020 9/24/2020    SBP (mmHg) 119 118 129 127 129    DBP (mmHg) 75 73 79 76 79    Pulse 62 64 67 62 63                Take meds as prescribed. Follow up with doctor  in 3 -4 days.   Return to ER immediately if symptoms worsen or persist.

## 2023-10-06 ENCOUNTER — OFFICE VISIT (OUTPATIENT)
Dept: URGENT CARE | Facility: CLINIC | Age: 78
End: 2023-10-06
Payer: MEDICARE

## 2023-10-06 VITALS
HEIGHT: 73 IN | OXYGEN SATURATION: 95 % | WEIGHT: 208 LBS | HEART RATE: 71 BPM | DIASTOLIC BLOOD PRESSURE: 64 MMHG | SYSTOLIC BLOOD PRESSURE: 105 MMHG | TEMPERATURE: 98 F | BODY MASS INDEX: 27.57 KG/M2 | RESPIRATION RATE: 15 BRPM

## 2023-10-06 DIAGNOSIS — K64.9 HEMORRHOIDS, UNSPECIFIED HEMORRHOID TYPE: ICD-10-CM

## 2023-10-06 DIAGNOSIS — R30.0 DYSURIA: Primary | ICD-10-CM

## 2023-10-06 LAB
BILIRUB UR QL STRIP: POSITIVE
GLUCOSE UR QL STRIP: NEGATIVE
KETONES UR QL STRIP: NEGATIVE
LEUKOCYTE ESTERASE UR QL STRIP: NEGATIVE
PH, POC UA: 5.5 (ref 5–8)
POC BLOOD, URINE: NEGATIVE
POC NITRATES, URINE: NEGATIVE
PROT UR QL STRIP: POSITIVE
SP GR UR STRIP: 1.01 (ref 1–1.03)
UROBILINOGEN UR STRIP-ACNC: NORMAL (ref 0.3–2.2)

## 2023-10-06 PROCEDURE — 99213 PR OFFICE/OUTPT VISIT, EST, LEVL III, 20-29 MIN: ICD-10-PCS | Mod: S$GLB,,, | Performed by: PHYSICIAN ASSISTANT

## 2023-10-06 PROCEDURE — 81003 POCT URINALYSIS, DIPSTICK, AUTOMATED, W/O SCOPE: ICD-10-PCS | Mod: QW,S$GLB,, | Performed by: PHYSICIAN ASSISTANT

## 2023-10-06 PROCEDURE — 87086 URINE CULTURE/COLONY COUNT: CPT | Performed by: PHYSICIAN ASSISTANT

## 2023-10-06 PROCEDURE — 81003 URINALYSIS AUTO W/O SCOPE: CPT | Mod: QW,S$GLB,, | Performed by: PHYSICIAN ASSISTANT

## 2023-10-06 PROCEDURE — 99213 OFFICE O/P EST LOW 20 MIN: CPT | Mod: S$GLB,,, | Performed by: PHYSICIAN ASSISTANT

## 2023-10-06 RX ORDER — SULFAMETHOXAZOLE AND TRIMETHOPRIM 800; 160 MG/1; MG/1
1 TABLET ORAL 2 TIMES DAILY
Qty: 20 TABLET | Refills: 0 | Status: SHIPPED | OUTPATIENT
Start: 2023-10-06 | End: 2023-10-16

## 2023-10-06 RX ORDER — HYDROCORTISONE 25 MG/G
CREAM TOPICAL 2 TIMES DAILY
Qty: 28 G | Refills: 3 | Status: SHIPPED | OUTPATIENT
Start: 2023-10-06

## 2023-10-06 NOTE — PROGRESS NOTES
"Subjective:      Patient ID: Peter Caldwell is a 77 y.o. male.    Vitals:  height is 6' 1" (1.854 m) and weight is 94.3 kg (208 lb). His temperature is 98.3 °F (36.8 °C). His blood pressure is 105/64 and his pulse is 71. His respiration is 15 and oxygen saturation is 95%.     Chief Complaint: Urinary Tract Infection (Entered by patient)    Urinary Tract Infection   This is a new problem. The current episode started in the past 7 days. The problem occurs every urination. The problem has been unchanged. The quality of the pain is described as burning. The pain is at a severity of 3/10. The pain is mild. There has been no fever. There is No history of pyelonephritis. Associated symptoms include frequency and urgency. Pertinent negatives include no behavior changes, chills, discharge, flank pain, hematuria, hesitancy, nausea, possible pregnancy, sweats, vomiting, weight loss, bubble bath use, constipation, rash or withholding. He has tried nothing for the symptoms.       Constitution: Negative for chills, sweating, fatigue and fever.   HENT:  Negative for ear pain, drooling, congestion, sore throat, trouble swallowing and voice change.    Neck: Negative for neck pain, neck stiffness, painful lymph nodes and neck swelling.   Cardiovascular:  Negative for chest pain, leg swelling, palpitations, sob on exertion and passing out.   Eyes:  Negative for eye discharge, eye itching, eye pain, eye redness and eyelid swelling.   Respiratory:  Negative for chest tightness, cough, sputum production, bloody sputum, shortness of breath, stridor and wheezing.    Gastrointestinal:  Positive for hemorrhoids. Negative for abdominal pain, abdominal bloating, nausea, vomiting, constipation, diarrhea and heartburn.   Genitourinary:  Positive for dysuria, frequency and urgency. Negative for urine decreased, flank pain, hematuria, genital sore, penile discharge, penile pain, penile swelling, scrotal swelling, testicular pain and pelvic pain. "   Musculoskeletal:  Negative for joint pain, joint swelling, abnormal ROM of joint, pain with walking, muscle cramps and muscle ache.   Skin:  Negative for rash and hives.   Allergic/Immunologic: Negative for hives, itching and sneezing.   Neurological:  Negative for dizziness, light-headedness, passing out, loss of balance, headaches, altered mental status, loss of consciousness, numbness and seizures.   Hematologic/Lymphatic: Negative for swollen lymph nodes.   Psychiatric/Behavioral:  Negative for altered mental status and nervous/anxious. The patient is not nervous/anxious.       Objective:     Physical Exam   Constitutional: He is oriented to person, place, and time. He appears well-developed. No distress.   HENT:   Head: Normocephalic and atraumatic.   Ears:   Right Ear: External ear normal.   Left Ear: External ear normal.   Nose: Nose normal. No nasal deformity. No epistaxis.   Mouth/Throat: Oropharynx is clear and moist and mucous membranes are normal.   Eyes: Conjunctivae and lids are normal.   Neck: Trachea normal and phonation normal. Neck supple.   Cardiovascular: Normal rate, regular rhythm and normal heart sounds.   Pulmonary/Chest: Effort normal and breath sounds normal.   Abdominal: Normal appearance and bowel sounds are normal. He exhibits no distension. Soft. There is no abdominal tenderness.   Genitourinary:         Comments: Pt deferred  exam.     Musculoskeletal: Normal range of motion.         General: Normal range of motion.   Neurological: He is alert and oriented to person, place, and time. He has normal reflexes.   Skin: Skin is warm, dry, intact and not diaphoretic.   Psychiatric: His speech is normal and behavior is normal. Judgment and thought content normal.   Nursing note and vitals reviewed.      Results for orders placed or performed in visit on 10/06/23   POCT Urinalysis, Dipstick, Automated, W/O Scope   Result Value Ref Range    POC Blood, Urine Negative Negative    POC  Bilirubin, Urine Positive (A) Negative    POC Urobilinogen, Urine Normal 0.3 - 2.2    POC Ketones, Urine Negative Negative    POC Protein, Urine Positive (A) Negative    POC Nitrates, Urine Negative Negative    POC Glucose, Urine Negative Negative    pH, UA 5.5 5 - 8    POC Specific Gravity, Urine 1.015 1.003 - 1.029    POC Leukocytes, Urine Negative Negative       Assessment:     1. Dysuria    2. Hemorrhoids, unspecified hemorrhoid type        Plan:   Discussed UA results with patient. Will call with Urine Culture results. Discussed DDX and treatment options with patient - will go ahead and treat empirically with antibiotics due to patient's symptoms at this time. Advised close follow-up with PCP and/or Urology for further evaluation as needed. ER precautions given to patient as well. Patient aware, verbalized understanding and agreed with plan of care.    Dysuria  -     POCT Urinalysis, Dipstick, Automated, W/O Scope  -     Culture, Urine  -     Ambulatory referral/consult to Urology    Hemorrhoids, unspecified hemorrhoid type    Other orders  -     sulfamethoxazole-trimethoprim 800-160mg (BACTRIM DS) 800-160 mg Tab; Take 1 tablet by mouth 2 (two) times daily. for 10 days  Dispense: 20 tablet; Refill: 0  -     hydrocortisone 2.5 % cream; Apply topically 2 (two) times daily. To rectal area  Dispense: 28 g; Refill: 3    There are no Patient Instructions on file for this visit.

## 2023-10-08 LAB — BACTERIA UR CULT: NO GROWTH

## 2023-10-09 ENCOUNTER — PATIENT MESSAGE (OUTPATIENT)
Dept: ADMINISTRATIVE | Facility: OTHER | Age: 78
End: 2023-10-09
Payer: MEDICARE

## 2023-10-11 ENCOUNTER — TELEPHONE (OUTPATIENT)
Dept: URGENT CARE | Facility: CLINIC | Age: 78
End: 2023-10-11
Payer: MEDICARE

## 2023-10-12 DIAGNOSIS — E78.00 PURE HYPERCHOLESTEROLEMIA: ICD-10-CM

## 2023-10-13 RX ORDER — ATORVASTATIN CALCIUM 80 MG/1
TABLET, FILM COATED ORAL
Qty: 90 TABLET | Refills: 3 | Status: SHIPPED | OUTPATIENT
Start: 2023-10-13

## 2023-10-24 ENCOUNTER — OFFICE VISIT (OUTPATIENT)
Dept: FAMILY MEDICINE | Facility: CLINIC | Age: 78
End: 2023-10-24
Payer: MEDICARE

## 2023-10-24 VITALS
OXYGEN SATURATION: 97 % | DIASTOLIC BLOOD PRESSURE: 72 MMHG | BODY MASS INDEX: 27.99 KG/M2 | HEART RATE: 71 BPM | WEIGHT: 211.19 LBS | HEIGHT: 73 IN | SYSTOLIC BLOOD PRESSURE: 114 MMHG

## 2023-10-24 DIAGNOSIS — R25.1 TREMOR: ICD-10-CM

## 2023-10-24 DIAGNOSIS — E78.01 FAMILIAL HYPERCHOLESTEROLEMIA: Chronic | ICD-10-CM

## 2023-10-24 DIAGNOSIS — I25.119 CORONARY ARTERY DISEASE INVOLVING NATIVE CORONARY ARTERY OF NATIVE HEART WITH ANGINA PECTORIS: Chronic | ICD-10-CM

## 2023-10-24 DIAGNOSIS — F43.22 ADJUSTMENT DISORDER WITH ANXIETY: ICD-10-CM

## 2023-10-24 DIAGNOSIS — R41.3 SHORT-TERM MEMORY LOSS: ICD-10-CM

## 2023-10-24 DIAGNOSIS — Z00.00 ENCOUNTER FOR PREVENTIVE HEALTH EXAMINATION: Primary | ICD-10-CM

## 2023-10-24 DIAGNOSIS — I10 BENIGN ESSENTIAL HYPERTENSION: Chronic | ICD-10-CM

## 2023-10-24 DIAGNOSIS — I71.43 INFRARENAL ABDOMINAL AORTIC ANEURYSM (AAA) WITHOUT RUPTURE: Chronic | ICD-10-CM

## 2023-10-24 DIAGNOSIS — C61 PROSTATE CANCER: ICD-10-CM

## 2023-10-24 DIAGNOSIS — R41.3 MEMORY DEFICITS: ICD-10-CM

## 2023-10-24 DIAGNOSIS — F33.1 MODERATE EPISODE OF RECURRENT MAJOR DEPRESSIVE DISORDER: ICD-10-CM

## 2023-10-24 PROBLEM — J06.9 URI (UPPER RESPIRATORY INFECTION): Status: RESOLVED | Noted: 2023-05-24 | Resolved: 2023-10-24

## 2023-10-24 PROCEDURE — 99999 PR PBB SHADOW E&M-EST. PATIENT-LVL V: ICD-10-PCS | Mod: PBBFAC,,, | Performed by: NURSE PRACTITIONER

## 2023-10-24 PROCEDURE — G0439 PR MEDICARE ANNUAL WELLNESS SUBSEQUENT VISIT: ICD-10-PCS | Mod: ,,, | Performed by: NURSE PRACTITIONER

## 2023-10-24 PROCEDURE — G0439 PPPS, SUBSEQ VISIT: HCPCS | Mod: ,,, | Performed by: NURSE PRACTITIONER

## 2023-10-24 PROCEDURE — 99999 PR PBB SHADOW E&M-EST. PATIENT-LVL V: CPT | Mod: PBBFAC,,, | Performed by: NURSE PRACTITIONER

## 2023-10-24 PROCEDURE — 99215 OFFICE O/P EST HI 40 MIN: CPT | Mod: PBBFAC,PO | Performed by: NURSE PRACTITIONER

## 2023-10-24 NOTE — PROGRESS NOTES
"Peter Caldwell presented for a  Medicare AWV and comprehensive Health Risk Assessment today. The following components were reviewed and updated:    Medical history  Family History  Social history  Allergies and Current Medications  Health Risk Assessment  Health Maintenance  Care Team     ** See Completed Assessments for Annual Wellness Visit within the encounter summary.**     The following assessments were completed:  Living Situation  CAGE  Depression Screening  Timed Get Up and Go  Whisper Test  Cognitive Function Screening      Nutrition Screening  ADL Screening  PAQ Screening    Review for Opioid Screening: Pt does not have Rx for Opioids  Review for Substance Use Disorders: Patient does not use substance      Vitals:    10/24/23 1256   BP: 114/72   BP Location: Left arm   Patient Position: Sitting   BP Method: Medium (Manual)   Pulse: 71   SpO2: 97%   Weight: 95.8 kg (211 lb 3.2 oz)   Height: 6' 1" (1.854 m)     Body mass index is 27.86 kg/m².  Physical Exam  Vitals reviewed.   Constitutional:       General: He is not in acute distress.  Pulmonary:      Effort: Pulmonary effort is normal. No respiratory distress.   Neurological:      Mental Status: He is alert.   Psychiatric:         Mood and Affect: Mood normal.         Behavior: Behavior normal.         Thought Content: Thought content normal.         Judgment: Judgment normal.       Diagnoses and health risks identified today and associated recommendations/orders:    1. Encounter for preventive health examination  Reviewed and discussed health maintenance.    He thinks he has had update covid vaccine at [facility] patient  - Ambulatory referral/consult to Neurology; Future    2. Prostate cancer  Stable- continue current treatment and follow up routinely with PCP and radiation hem/onc ()    3. Coronary artery disease involving native coronary artery of native heart with angina pectoris  Stable- continue current treatment and follow up routinely " with PCP and cardiology ()    4. Benign essential hypertension  Stable- continue current treatment and follow up routinely with PCP and cardiology ()    5. Familial hypercholesterolemia  Stable- continue current treatment and follow up routinely with PCP and cardiology ()    6. Infrarenal abdominal aortic aneurysm (AAA) without rupture  Stable- continue current treatment and follow up routinely with PCP and cardiology ()    7. Moderate episode of recurrent major depressive disorder  Stable- continue current treatment and follow up routinely with PCP     8. Adjustment disorder with anxiety  Stable- continue current treatment and follow up routinely with PCP     9. Memory deficits  - Ambulatory referral/consult to Neurology; Future    10. Short-term memory loss  - Ambulatory referral/consult to Neurology; Future    11. Tremor  - Ambulatory referral/consult to Neurology; Future    Provided Peter with a 5-10 year written screening schedule and personal prevention plan. Recommendations were developed using the USPSTF age appropriate recommendations. Education, counseling, and referrals were provided as needed. After Visit Summary printed and given to patient which includes a list of additional screenings\tests needed.    I offered to discuss advanced care planning, including how to pick a person who would make decisions for you if you were unable to make them for yourself, called a health care power of , and what kind of decisions you might make such as use of life sustaining treatments such as ventilators and tube feeding when faced with a life limiting illness recorded on a living will that they will need to know. (How you want to be cared for as you near the end of your natural life)   X  Patient has advanced directives written and agrees to provide copies to the institution.  Yessy White, NP

## 2023-10-24 NOTE — PATIENT INSTRUCTIONS
Counseling and Referral of Other Preventative  (Italic type indicates deductible and co-insurance are waived)    Patient Name: Peter Caldwell  Today's Date: 10/24/2023    Health Maintenance       Date Due Completion Date    COVID-19 Vaccine (14 - 2023-24 season) 09/01/2023 12/2/2022    Lipid Panel 04/04/2024 4/4/2023    PROSTATE-SPECIFIC ANTIGEN 06/01/2024 6/1/2023    Override on 1/7/2019: Done    Override on 3/12/2013: Done (DR Barroso Urologist)    Aspirin/Antiplatelet Therapy 07/25/2024 7/25/2023    Colonoscopy 01/19/2028 1/19/2023    TETANUS VACCINE 08/19/2032 8/19/2022        No orders of the defined types were placed in this encounter.      The following information is provided to all patients.  This information is to help you find resources for any of the problems found today that may be affecting your health:                Living healthy guide: www.[facility].[place].[email or web address]      Understanding Diabetes: www.diabetes.org      Eating healthy: www.cdc.gov/healthyweight      CDC home safety checklist: www.cdc.gov/steadi/patient.html      Agency on Aging: www.goea.[place].[facility]      Alcoholics anonymous (AA): www.aa.org      Physical Activity: www.chelle.nih.gov/ed5ggir      Tobacco use: www.quitwithusla.org

## 2023-10-25 ENCOUNTER — TELEPHONE (OUTPATIENT)
Dept: NEUROLOGY | Facility: CLINIC | Age: 78
End: 2023-10-25
Payer: MEDICARE

## 2023-10-26 ENCOUNTER — TELEPHONE (OUTPATIENT)
Dept: NEUROLOGY | Facility: CLINIC | Age: 78
End: 2023-10-26
Payer: MEDICARE

## 2023-10-26 NOTE — TELEPHONE ENCOUNTER
Spoke with the pt, he has decided to wait to schedule an appt with neuro for memory and tremors.  He wants to wait to see Dr. Delaney.  Informed him that we are scheduling into 2024.  He v/u.

## 2023-11-08 ENCOUNTER — HOSPITAL ENCOUNTER (OUTPATIENT)
Dept: CARDIOLOGY | Facility: HOSPITAL | Age: 78
Discharge: HOME | End: 2023-11-08
Attending: INTERNAL MEDICINE
Payer: MEDICARE

## 2023-11-08 VITALS
HEIGHT: 73 IN | WEIGHT: 211 LBS | HEART RATE: 54 BPM | SYSTOLIC BLOOD PRESSURE: 122 MMHG | DIASTOLIC BLOOD PRESSURE: 70 MMHG | BODY MASS INDEX: 27.96 KG/M2

## 2023-11-08 DIAGNOSIS — I10 BENIGN ESSENTIAL HYPERTENSION: Chronic | ICD-10-CM

## 2023-11-08 LAB
ASCENDING AORTA: 3.61 CM
AV INDEX (PROSTH): 0.72
AV MEAN GRADIENT: 5 MMHG
AV PEAK GRADIENT: 10 MMHG
AV VALVE AREA BY VELOCITY RATIO: 2.92 CM²
AV VALVE AREA: 3.1 CM²
AV VELOCITY RATIO: 0.68
BSA FOR ECHO PROCEDURE: 2.22 M2
CV ECHO LV RWT: 0.33 CM
DOP CALC AO PEAK VEL: 1.59 M/S
DOP CALC AO VTI: 34.2 CM
DOP CALC LVOT AREA: 4.3 CM2
DOP CALC LVOT DIAMETER: 2.34 CM
DOP CALC LVOT PEAK VEL: 1.08 M/S
DOP CALC LVOT STROKE VOLUME: 106.13 CM3
DOP CALCLVOT PEAK VEL VTI: 24.69 CM
E WAVE DECELERATION TIME: 239.1 MSEC
E/A RATIO: 0.64
E/E' RATIO: 7.65 M/S
ECHO LV POSTERIOR WALL: 0.86 CM (ref 0.6–1.1)
FRACTIONAL SHORTENING: 40 % (ref 28–44)
INTERVENTRICULAR SEPTUM: 0.86 CM (ref 0.6–1.1)
LA MAJOR: 4.19 CM
LA MINOR: 4.3 CM
LA WIDTH: 3.5 CM
LEFT ATRIUM SIZE: 4.72 CM
LEFT ATRIUM VOLUME INDEX MOD: 16 ML/M2
LEFT ATRIUM VOLUME INDEX: 27.1 ML/M2
LEFT ATRIUM VOLUME MOD: 35.09 CM3
LEFT ATRIUM VOLUME: 59.6 CM3
LEFT INTERNAL DIMENSION IN SYSTOLE: 3.15 CM (ref 2.1–4)
LEFT VENTRICLE DIASTOLIC VOLUME INDEX: 60.43 ML/M2
LEFT VENTRICLE DIASTOLIC VOLUME: 132.95 ML
LEFT VENTRICLE MASS INDEX: 74 G/M2
LEFT VENTRICLE SYSTOLIC VOLUME INDEX: 17.9 ML/M2
LEFT VENTRICLE SYSTOLIC VOLUME: 39.32 ML
LEFT VENTRICULAR INTERNAL DIMENSION IN DIASTOLE: 5.26 CM (ref 3.5–6)
LEFT VENTRICULAR MASS: 162.45 G
LV LATERAL E/E' RATIO: 7.22 M/S
LV SEPTAL E/E' RATIO: 8.13 M/S
MV A" WAVE DURATION": 18.27 MSEC
MV PEAK A VEL: 1.01 M/S
MV PEAK E VEL: 0.65 M/S
MV STENOSIS PRESSURE HALF TIME: 69.34 MS
MV VALVE AREA P 1/2 METHOD: 3.17 CM2
PISA TR MAX VEL: 2.51 M/S
PULM VEIN S/D RATIO: 1.24
PV PEAK D VEL: 0.29 M/S
PV PEAK S VEL: 0.36 M/S
RA MAJOR: 4.51 CM
RA PRESSURE ESTIMATED: 3 MMHG
RA WIDTH: 2.77 CM
RIGHT VENTRICULAR END-DIASTOLIC DIMENSION: 3.31 CM
RV TB RVSP: 6 MMHG
SINUS: 3.71 CM
STJ: 3.58 CM
TDI LATERAL: 0.09 M/S
TDI SEPTAL: 0.08 M/S
TDI: 0.09 M/S
TR MAX PG: 25 MMHG
TRICUSPID ANNULAR PLANE SYSTOLIC EXCURSION: 1.69 CM
TV REST PULMONARY ARTERY PRESSURE: 28 MMHG
Z-SCORE OF LEFT VENTRICULAR DIMENSION IN END DIASTOLE: -3.56
Z-SCORE OF LEFT VENTRICULAR DIMENSION IN END SYSTOLE: -2.93

## 2023-11-08 PROCEDURE — 93306 TTE W/DOPPLER COMPLETE: CPT

## 2023-11-08 PROCEDURE — 93306 ECHO (CUPID ONLY): ICD-10-PCS | Mod: 26,,, | Performed by: INTERNAL MEDICINE

## 2023-11-08 PROCEDURE — 93306 TTE W/DOPPLER COMPLETE: CPT | Mod: 26,,, | Performed by: INTERNAL MEDICINE

## 2023-11-14 ENCOUNTER — OFFICE VISIT (OUTPATIENT)
Dept: CARDIOLOGY | Facility: CLINIC | Age: 78
End: 2023-11-14
Payer: MEDICARE

## 2023-11-14 VITALS
BODY MASS INDEX: 28.91 KG/M2 | WEIGHT: 219.13 LBS | SYSTOLIC BLOOD PRESSURE: 93 MMHG | HEART RATE: 67 BPM | DIASTOLIC BLOOD PRESSURE: 60 MMHG

## 2023-11-14 DIAGNOSIS — I10 BENIGN ESSENTIAL HYPERTENSION: Chronic | ICD-10-CM

## 2023-11-14 DIAGNOSIS — Z98.61 S/P PTCA (PERCUTANEOUS TRANSLUMINAL CORONARY ANGIOPLASTY): Chronic | ICD-10-CM

## 2023-11-14 DIAGNOSIS — E78.01 FAMILIAL HYPERCHOLESTEROLEMIA: Chronic | ICD-10-CM

## 2023-11-14 DIAGNOSIS — E78.41 ELEVATED LIPOPROTEIN(A): Chronic | ICD-10-CM

## 2023-11-14 DIAGNOSIS — I25.119 CORONARY ARTERY DISEASE INVOLVING NATIVE CORONARY ARTERY OF NATIVE HEART WITH ANGINA PECTORIS: Primary | Chronic | ICD-10-CM

## 2023-11-14 DIAGNOSIS — I71.43 INFRARENAL ABDOMINAL AORTIC ANEURYSM (AAA) WITHOUT RUPTURE: Chronic | ICD-10-CM

## 2023-11-14 DIAGNOSIS — Z95.1 S/P CABG (CORONARY ARTERY BYPASS GRAFT): Chronic | ICD-10-CM

## 2023-11-14 PROCEDURE — 99214 PR OFFICE/OUTPT VISIT, EST, LEVL IV, 30-39 MIN: ICD-10-PCS | Mod: S$PBB,,, | Performed by: INTERNAL MEDICINE

## 2023-11-14 PROCEDURE — 99214 OFFICE O/P EST MOD 30 MIN: CPT | Mod: S$PBB,,, | Performed by: INTERNAL MEDICINE

## 2023-11-14 PROCEDURE — 99999 PR PBB SHADOW E&M-EST. PATIENT-LVL III: ICD-10-PCS | Mod: PBBFAC,,, | Performed by: INTERNAL MEDICINE

## 2023-11-14 PROCEDURE — 99999 PR PBB SHADOW E&M-EST. PATIENT-LVL III: CPT | Mod: PBBFAC,,, | Performed by: INTERNAL MEDICINE

## 2023-11-14 PROCEDURE — 99213 OFFICE O/P EST LOW 20 MIN: CPT | Mod: PBBFAC | Performed by: INTERNAL MEDICINE

## 2023-11-14 RX ORDER — METOPROLOL SUCCINATE 25 MG/1
25 TABLET, EXTENDED RELEASE ORAL NIGHTLY
Qty: 90 TABLET | Refills: 4 | Status: SHIPPED | OUTPATIENT
Start: 2023-11-14

## 2023-11-14 NOTE — PROGRESS NOTES
Subjective:   11/14/2023     Patient ID:  Peter Caldwell is a 77 y.o. male who presents for evaulation of Results (Echo results)        Patient here for assumption of cardiac follow-up.  Does have coronary artery disease and is status post coronary artery bypass grafting.  Echocardiography since then has shown evidence for anteroapical hypokinesis with mildly decreased systolic function.  Recent echo showed overall normal function.  Segmental wall motion abnormalities remain present    Hypertension is present, currently treated with metoprolol.  He has developed fatigue though, blood pressures have been well controlled, on lower dose metoprolol.  Fatigue has improved.    Hypercholesterolemia is present.  In February of 2022, his LDL cholesterol 71 on atorvastatin 80 mg daily.  Subsequently, ezetimibe 10 mg daily was added to his medications.  On 04/04/2023, his total cholesterol was 122, HDL 48, LDL 60 and triglycerides 68.      He was noted to have a elevated lipoprotein small a of 71, upper limit of normal is 30.    In 2005, his LDL cholesterol is 192, familial hypercholesterolemia appears to be present     He has had recent episodes of chest pain, not actually with activity, but after activity.        Past Medical History:   Diagnosis Date    AAA (abdominal aortic aneurysm)     Arthritis     BPH (benign prostatic hyperplasia)     Cataract     Colon polyps 2006    Coronary artery disease     Depression     Ex-smoker     Hx of psychiatric care     Lexapro, valium    Mood disorder     chalasani    Old MI (myocardial infarction) 10/07/2022    ELINA (obstructive sleep apnea)     Osteopenia 4/15 aylin 4/20    Other insomnia 01/07/2019    Prostate cancer 03/12/2020    S/P CABG (coronary artery bypass graft) 08/09/2013    S/P PTCA (percutaneous transluminal coronary angioplasty) 08/09/2013    Skin cancer 05/10/2023    Therapy        Review of patient's allergies indicates:   Allergen Reactions    Oxycodone Other (See  Comments)    Penicillins      Other reaction(s): Rash    Promethazine Other (See Comments)     paranoia    Percodan [oxycodone hcl-oxycodone-asa]      Sleep / nightmare problems           Current Outpatient Medications:     acetaminophen (TYLENOL) 325 MG tablet, Take 325 mg by mouth every 6 (six) hours as needed for Pain., Disp: , Rfl:     aspirin (ECOTRIN) 81 MG EC tablet, Take 81 mg by mouth every evening. MD Sylvia Pisano RN; DIANA DASILVA Staff Caller: Unspecified (Today,  2:44 PM) As per notes no need to hold ASA.  If he can stop it that is fine. Likely less hematuria after biopsy but not required, Disp: , Rfl:     atorvastatin (LIPITOR) 80 MG tablet, TAKE 1 TABLET(80 MG) BY MOUTH EVERY NIGHT, Disp: 90 tablet, Rfl: 3    ezetimibe (ZETIA) 10 mg tablet, Take 1 tablet (10 mg total) by mouth once daily., Disp: 90 tablet, Rfl: 3    fluticasone propionate (FLONASE) 50 mcg/actuation nasal spray, USE 1 SPRAY IN EACH NOSTRIL EVERY DAY, Disp: 32 g, Rfl: 0    hydrocortisone 2.5 % cream, Apply topically 2 (two) times daily. To rectal area, Disp: 28 g, Rfl: 3    ibuprofen (ADVIL,MOTRIN) 200 MG tablet, Take 200 mg by mouth as needed for Pain. Hold 1 week prior to surgery, Disp: , Rfl:     MELATONIN ORAL, Take by mouth as needed., Disp: , Rfl:     multivit with minerals/lutein (MULTIVITAMIN 50 PLUS ORAL), Take by mouth., Disp: , Rfl:     sertraline (ZOLOFT) 100 MG tablet, Take 100 mg by mouth Daily., Disp: , Rfl:     tamsulosin (FLOMAX) 0.4 mg Cap, TAKE 1 CAPSULE(0.4 MG) BY MOUTH EVERY DAY, Disp: 90 capsule, Rfl: 3    metoprolol succinate (TOPROL-XL) 25 MG 24 hr tablet, Take 1 tablet (25 mg total) by mouth every evening., Disp: 90 tablet, Rfl: 4     Objective:   Review of Systems   Constitutional: Positive for malaise/fatigue and weight gain.   Cardiovascular:  Negative for chest pain, claudication, cyanosis, dyspnea on exertion, irregular heartbeat, leg swelling, near-syncope, orthopnea,  palpitations, paroxysmal nocturnal dyspnea and syncope.   Neurological:  Positive for excessive daytime sleepiness.   Psychiatric/Behavioral:  Positive for depression. The patient is nervous/anxious.          Vitals:    11/14/23 1114   BP: 93/60   Pulse: 67       Wt Readings from Last 3 Encounters:   11/14/23 99.4 kg (219 lb 2.2 oz)   11/08/23 95.7 kg (211 lb)   10/24/23 95.8 kg (211 lb 3.2 oz)     Temp Readings from Last 3 Encounters:   10/06/23 98.3 °F (36.8 °C)   06/06/23 98.2 °F (36.8 °C)   05/24/23 97 °F (36.1 °C) (Oral)     BP Readings from Last 3 Encounters:   11/14/23 93/60   11/08/23 122/70   10/24/23 114/72     Pulse Readings from Last 3 Encounters:   11/14/23 67   11/08/23 (!) 54   10/24/23 71             Physical Exam  Vitals reviewed.   Constitutional:       General: He is not in acute distress.     Appearance: He is well-developed.   HENT:      Head: Normocephalic and atraumatic.      Nose: Nose normal.   Eyes:      Conjunctiva/sclera: Conjunctivae normal.      Pupils: Pupils are equal, round, and reactive to light.   Neck:      Vascular: No carotid bruit or JVD.   Cardiovascular:      Rate and Rhythm: Normal rate and regular rhythm.      Pulses: Intact distal pulses.      Heart sounds: Normal heart sounds. No murmur heard.     No friction rub. No gallop.   Pulmonary:      Effort: Pulmonary effort is normal. No respiratory distress.      Breath sounds: Normal breath sounds. No wheezing or rales.   Chest:      Chest wall: No tenderness.   Abdominal:      General: Bowel sounds are normal. There is no distension.      Palpations: Abdomen is soft.      Tenderness: There is no abdominal tenderness.   Musculoskeletal:         General: No tenderness or deformity. Normal range of motion.      Cervical back: Normal range of motion and neck supple.      Right lower leg: No edema.      Left lower leg: No edema.   Skin:     General: Skin is warm and dry.      Findings: No erythema or rash.   Neurological:       Mental Status: He is alert and oriented to person, place, and time.      Cranial Nerves: No cranial nerve deficit.      Motor: No abnormal muscle tone.      Coordination: Coordination normal.   Psychiatric:         Behavior: Behavior normal.         Thought Content: Thought content normal.         Judgment: Judgment normal.           Lab Results   Component Value Date    CHOL 122 04/04/2023    CHOL 142 02/25/2022    CHOL 131 08/11/2021     Lab Results   Component Value Date    HDL 48 04/04/2023    HDL 47 02/25/2022    HDL 44 08/11/2021     Lab Results   Component Value Date    LDLCALC 60.4 (L) 04/04/2023    LDLCALC 70.8 02/25/2022    LDLCALC 67.2 08/11/2021     Lab Results   Component Value Date    ALT 31 04/04/2023    AST 27 04/04/2023    AST 21 02/25/2022    AST 23 08/11/2021     Lab Results   Component Value Date    CREATININE 1.0 04/04/2023    BUN 9 04/04/2023     04/04/2023    K 4.6 04/04/2023    CO2 24 04/04/2023    CO2 28 02/25/2022    CO2 30 (H) 08/11/2021     Lab Results   Component Value Date    HGB 13.9 (L) 04/04/2023    HCT 43.6 04/04/2023    HCT 46.9 05/10/2021    HCT 46.0 06/02/2009                         Assessment and Plan:     Coronary artery disease involving native coronary artery of native heart with angina pectoris  Comments:  Currently asymptomatic on medical therapy    Elevated lipoprotein(a)  Comments:  No specific treatment for, aggressive standard lipid management    Familial hypercholesterolemia  Comments:  Well controlled on high-dose statin plus ezetimibe    S/P CABG (coronary artery bypass graft)    S/P PTCA (percutaneous transluminal coronary angioplasty)    Infrarenal abdominal aortic aneurysm (AAA) without rupture  Comments:  Repeat abdominal aortic ultrasound in January  Orders:  -     US Abdominal Aorta; Future; Expected date: 01/10/2024    Benign essential hypertension  Comments:  Currently well controlled  Orders:  -     metoprolol succinate (TOPROL-XL) 25 MG 24 hr tablet;  Take 1 tablet (25 mg total) by mouth every evening.  Dispense: 90 tablet; Refill: 4         Follow up in about 6 months (around 5/14/2024).          Future Appointments   Date Time Provider Department Center   12/7/2023  1:05 PM LAB, [facility]   12/12/2023  1:00 [facility]   1/9/2024 10:[address]   1/31/2024  1:00 PM Ricky Delaney MD [facility]

## 2023-12-07 ENCOUNTER — LAB VISIT (OUTPATIENT)
Dept: LAB | Facility: HOSPITAL | Age: 78
End: 2023-12-07
Attending: RADIOLOGY
Payer: MEDICARE

## 2023-12-07 DIAGNOSIS — C61 PROSTATE CANCER: ICD-10-CM

## 2023-12-07 LAB — COMPLEXED PSA SERPL-MCNC: 0.28 NG/ML (ref 0–4)

## 2023-12-07 PROCEDURE — 84153 ASSAY OF PSA TOTAL: CPT | Performed by: RADIOLOGY

## 2023-12-07 PROCEDURE — 36415 COLL VENOUS BLD VENIPUNCTURE: CPT | Mod: PN | Performed by: RADIOLOGY

## 2023-12-12 ENCOUNTER — OFFICE VISIT (OUTPATIENT)
Dept: RADIATION ONCOLOGY | Facility: CLINIC | Age: 78
End: 2023-12-12
Payer: MEDICARE

## 2023-12-12 VITALS
TEMPERATURE: 98 F | SYSTOLIC BLOOD PRESSURE: 130 MMHG | OXYGEN SATURATION: 96 % | WEIGHT: 203.06 LBS | DIASTOLIC BLOOD PRESSURE: 63 MMHG | BODY MASS INDEX: 26.79 KG/M2 | RESPIRATION RATE: 16 BRPM | HEART RATE: 67 BPM

## 2023-12-12 DIAGNOSIS — R35.0 BENIGN PROSTATIC HYPERPLASIA WITH URINARY FREQUENCY: Primary | ICD-10-CM

## 2023-12-12 DIAGNOSIS — N40.1 BENIGN PROSTATIC HYPERPLASIA WITH URINARY FREQUENCY: Primary | ICD-10-CM

## 2023-12-12 DIAGNOSIS — C61 PROSTATE CANCER: ICD-10-CM

## 2023-12-12 PROCEDURE — 99213 PR OFFICE/OUTPT VISIT, EST, LEVL III, 20-29 MIN: ICD-10-PCS | Mod: S$PBB,,, | Performed by: RADIOLOGY

## 2023-12-12 PROCEDURE — 99213 OFFICE O/P EST LOW 20 MIN: CPT | Mod: S$PBB,,, | Performed by: RADIOLOGY

## 2023-12-12 PROCEDURE — 99999 PR PBB SHADOW E&M-EST. PATIENT-LVL III: ICD-10-PCS | Mod: PBBFAC,,, | Performed by: RADIOLOGY

## 2023-12-12 PROCEDURE — 99999 PR PBB SHADOW E&M-EST. PATIENT-LVL III: CPT | Mod: PBBFAC,,, | Performed by: RADIOLOGY

## 2023-12-12 PROCEDURE — 99213 OFFICE O/P EST LOW 20 MIN: CPT | Mod: PBBFAC,PN | Performed by: RADIOLOGY

## 2023-12-12 RX ORDER — DUTASTERIDE 0.5 MG/1
0.5 CAPSULE, LIQUID FILLED ORAL DAILY
Qty: 30 CAPSULE | Refills: 11 | Status: SHIPPED | OUTPATIENT
Start: 2023-12-12 | End: 2024-12-11

## 2023-12-12 NOTE — PROGRESS NOTES
[facility] Department of Radiation Oncology  Follow Up Visit Note    Diagnosis:  Peter Caldwell is a 78 y.o. male with a(n) low volume, favorable intermediate risk adenocarcinoma of the prostate.  He completed a course of radiation therapy on 10/5/22 to 79.2Gy (due to gland 120cc, decreased from 159 with 6 months pre-medication with ADT).      Oncologic History:  Oncology History   Prostate cancer   3/12/2020 Initial Diagnosis    Prostate cancer     8/20/2021 Cancer Staged    Staging form: Prostate, AJCC 8th Edition  - Clinical stage from 8/20/2021: Stage IIB (cT1c, cN0, cM0, PSA: 9.8, Grade Group: 2)     9/2/2022 - 10/5/2022 Radiation Therapy    Treating physician: Rani Rodriguez  Total Dose: 79.2 Gy to the prostate  Fractions: 44    Treatment Site Ref. ID Energy Dose/Fx (Gy) #Fx Dose Correction (Gy) Total Dose (Gy) Start Date End Date Elapsed Days   IMRT Prost PTV 6X 1.8 44 / 44 0 79.2 8/2/2022 10/5/2022 64           Interval History  The patient presents today for a regularly scheduled follow up visit.  He was last seen in our clinic on 6/6/23.   Since that time, interval PSA on 12/7/23 was 0.28.       Latest Reference Range & Units Most Recent 12/07/21 10:02 11/28/22 11:50 06/01/23 10:35 12/07/23 12:53   PSA Diagnostic 0.00 - 4.00 ng/mL 0.28  12/7/23 12:53 9.8 (H) 0.87 0.63 0.28   (H): Data is abnormally high    Since completing treatment, his urinary symptoms/LUTS have significantly improved.     IPSS today 21 (6 at last visit).    Nocturia x 3  Frequency+++  Incomplete emptying ++  Urgency +++  Intermittency+  Weak stream ++  Bowel movements regular.  Occasional small blood with straining. No hot flashes        Review of Systems   Review of Systems   Constitutional:  Positive for malaise/fatigue (stable). Negative for chills and fever.   Gastrointestinal:  Positive for blood in stool (rarely, small with straining). Negative for abdominal pain, constipation and diarrhea.    Genitourinary:  Positive for frequency, hematuria and urgency. Negative for dysuria.   Musculoskeletal: Negative.  Negative for back pain, joint pain and neck pain.   Neurological:  Negative for dizziness and headaches.   Psychiatric/Behavioral:  The patient is nervous/anxious.        Social History:  Social History     Tobacco Use    Smoking status: Former     Current packs/day: 0.00     Average packs/day: 0.2 packs/day for 6.0 years (0.9 ttl pk-yrs)     Types: Cigarettes     Start date:      Quit date:      Years since quittin.9    Smokeless tobacco: Never   Substance Use Topics    Alcohol use: Not Currently     Alcohol/week: 1.0 standard drink of alcohol     Types: 1 Glasses of wine per week    Drug use: No       Family History:  Cancer-related family history is not on file.    Exam:  Vitals:    23 1257   BP: 130/63   Pulse: 67   Resp: 16   Temp: 98.4 °F (36.9 °C)   SpO2: 96%   Weight: 92.1 kg (203 lb 0.7 oz)       Constitutional: Pleasant 78 y.o. male in no acute distress.  Well nourished. Well groomed.   HEENT: Normocephalic and atraumatic   Lungs: No audible wheezing.  Normal effort.   Musculoskeletal: No gross MSK deformities. Ambulates independently  Skin: No rashes appreciated.   Psych: Alert and oriented with somewhat blunted affect.  Neuro:   Grossly normal.      Assessment:  Recovering well from acute radiation therapy-related toxicities  ECOG: (0) Fully active, able to carry on all predisease performance without restriction    Plan:  Follow up in 6 months with PSA  Restart  Avodart due to worsening LUTS; will contact our office if no improvement in 1-2 weeks after starting  Continue Flomax  Follow up with Urology as directed (not on ADT)  He was given our contact information, and he was told that he could call our clinic at anytime if he has any questions or concerns.  Follow up with other providers as directed    22 minutes spent in chart/case review, face-to-face interaction,  discussion with other providers, and treatment planning/coordination of care.

## 2023-12-27 ENCOUNTER — PATIENT MESSAGE (OUTPATIENT)
Dept: ADMINISTRATIVE | Facility: OTHER | Age: 78
End: 2023-12-27
Payer: MEDICARE

## 2023-12-30 DIAGNOSIS — R09.81 NASAL CONGESTION: Primary | ICD-10-CM

## 2023-12-30 NOTE — TELEPHONE ENCOUNTER
No care due was identified.  [facility] Embedded Care Due Messages. Reference number: [id number].   12/30/2023 11:27:47 AM CST

## 2024-01-01 RX ORDER — FLUTICASONE PROPIONATE 50 MCG
SPRAY, SUSPENSION (ML) NASAL
Qty: 32 G | Refills: 3 | Status: SHIPPED | OUTPATIENT
Start: 2024-01-01

## 2024-01-01 NOTE — TELEPHONE ENCOUNTER
Refill Routing Note   Medication(s) are not appropriate for processing by [facility] for the following reason(s):        Patient not seen by provider within 15 months    [facility] action(s):  Defer               Appointments  past 12m or future 3m with PCP    Date Provider   Last Visit   6/17/2022 Ricky Delaney MD   Next Visit   1/31/2024 Ricky Delaney MD   ED visits in past 90 days: 0        Note composed:9:35 AM 01/01/2024

## 2024-01-09 ENCOUNTER — HOSPITAL ENCOUNTER (OUTPATIENT)
Dept: RADIOLOGY | Facility: HOSPITAL | Age: 79
Discharge: HOME | End: 2024-01-09
Attending: INTERNAL MEDICINE
Payer: MEDICARE

## 2024-01-09 DIAGNOSIS — I71.43 INFRARENAL ABDOMINAL AORTIC ANEURYSM (AAA) WITHOUT RUPTURE: Chronic | ICD-10-CM

## 2024-01-09 PROCEDURE — 76775 US EXAM ABDO BACK WALL LIM: CPT | Mod: 26,,, | Performed by: RADIOLOGY

## 2024-01-09 PROCEDURE — 76775 US EXAM ABDO BACK WALL LIM: CPT | Mod: TC,PO

## 2024-01-31 ENCOUNTER — OFFICE VISIT (OUTPATIENT)
Dept: FAMILY MEDICINE | Facility: CLINIC | Age: 79
End: 2024-01-31
Payer: MEDICARE

## 2024-01-31 VITALS
SYSTOLIC BLOOD PRESSURE: 118 MMHG | HEART RATE: 63 BPM | HEIGHT: 73 IN | OXYGEN SATURATION: 95 % | WEIGHT: 211.63 LBS | BODY MASS INDEX: 28.05 KG/M2 | DIASTOLIC BLOOD PRESSURE: 60 MMHG

## 2024-01-31 DIAGNOSIS — I25.119 CORONARY ARTERY DISEASE INVOLVING NATIVE CORONARY ARTERY OF NATIVE HEART WITH ANGINA PECTORIS: Primary | ICD-10-CM

## 2024-01-31 DIAGNOSIS — F33.1 MODERATE EPISODE OF RECURRENT MAJOR DEPRESSIVE DISORDER: ICD-10-CM

## 2024-01-31 DIAGNOSIS — G47.52 REM SLEEP BEHAVIOR DISORDER: ICD-10-CM

## 2024-01-31 DIAGNOSIS — I71.43 INFRARENAL ABDOMINAL AORTIC ANEURYSM (AAA) WITHOUT RUPTURE: ICD-10-CM

## 2024-01-31 PROBLEM — C61 PROSTATE CANCER: Status: RESOLVED | Noted: 2020-03-12 | Resolved: 2024-01-31

## 2024-01-31 PROCEDURE — 99999 PR PBB SHADOW E&M-EST. PATIENT-LVL IV: CPT | Mod: PBBFAC,,, | Performed by: FAMILY MEDICINE

## 2024-01-31 PROCEDURE — 99214 OFFICE O/P EST MOD 30 MIN: CPT | Mod: S$PBB,,, | Performed by: FAMILY MEDICINE

## 2024-01-31 PROCEDURE — 99214 OFFICE O/P EST MOD 30 MIN: CPT | Mod: PBBFAC,PO | Performed by: FAMILY MEDICINE

## 2024-01-31 RX ORDER — SERTRALINE HYDROCHLORIDE 100 MG/1
200 TABLET, FILM COATED ORAL DAILY
Qty: 180 TABLET | Refills: 3 | Status: SHIPPED | OUTPATIENT
Start: 2024-01-31 | End: 2026-01-20

## 2024-01-31 NOTE — PROGRESS NOTES
Subjective:       Patient ID: Peter Caldwell is a 78 y.o. male.    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  By and large she is doing well.  Recent laboratory results were fine.  In 2023 had melanoma which was surgically removed and is in remission.  We also spoke about BPH with lower urinary tract symptoms.  He is on Flomax.  Symptoms are variable but if they worsen I would recommend urinary tract evaluation with Urology.  A melanoma was removed in 2023 and apparently is in remission clear margins.  He is being followed by Dermatology.  He has an infrarenal aortic aneurysm which has not changed recently on ultrasound.  He has been followed by Cardiology for CAD.  He had a CABG times 4 at least and also percutaneous trans luminal coronary angioplasty.  A patient reports post BM hematochezia which is very slight.  He is up-to-date on colonoscopy 1 year ago.  The bleeding is not voluminous.  If it increase his will refer to Colorectal surgery.  The patient has developed a mild resting tremor with slight intention component.  Will keep an eye on it.  He has no symptoms or signs of Parkinson's disease.  The patient does have depression or losses in his life however [facility] and also I recommend we takes Zoloft up to 200 mg a day.  No suicidal thoughts, intentions, or plans.  The patient has been having fatigue lately and has had extremely bizarre dreams.  I am worried about this being a REM sleep disorder then would recommend that we refer him to sleep medicine for evaluation.    Review of Systems   Constitutional:  Positive for fatigue.   HENT: Negative.     Eyes: Negative.    Respiratory: Negative.     Cardiovascular: Negative.    Gastrointestinal: Negative.    Endocrine: Negative.    Genitourinary: Negative.    Musculoskeletal: Negative.    Skin: Negative.    Allergic/Immunologic: Negative.    Neurological:  "Negative.    Hematological: Negative.    Psychiatric/Behavioral: Negative.         Objective:      Vitals:    01/31/24 1255   BP: 118/60   BP Location: Right arm   Patient Position: Sitting   BP Method: Large (Manual)   Pulse: 63   SpO2: 95%   Weight: 96 kg (211 lb 10.3 oz)   Height: 6' 1" (1.854 m)      Physical Exam    Results for orders placed or performed in visit on 12/07/23   Prostate Specific Antigen, Diagnostic   Result Value Ref Range    PSA Diagnostic 0.28 0.00 - 4.00 ng/mL      Assessment:       1. Coronary artery disease involving native coronary artery of native heart with angina pectoris    2. Moderate episode of recurrent major depressive disorder    3. REM sleep behavior disorder    4. Infrarenal abdominal aortic aneurysm (AAA) without rupture        Plan:       Coronary artery disease involving native coronary artery of native heart with angina pectoris    Moderate episode of recurrent major depressive disorder  -     sertraline (ZOLOFT) 100 MG tablet; Take 2 tablets (200 mg total) by mouth once daily.  Dispense: 180 tablet; Refill: 3    REM sleep behavior disorder  -     Ambulatory referral/consult to Sleep Disorders; Future; Expected date: 02/07/2024    Infrarenal abdominal aortic aneurysm (AAA) without rupture          Medication List with Changes/Refills   New Medications    SERTRALINE (ZOLOFT) 100 MG TABLET    Take 2 tablets (200 mg total) by mouth once daily.   Current Medications    ACETAMINOPHEN (TYLENOL) 325 MG TABLET    Take 325 mg by mouth every 6 (six) hours as needed for Pain.    ASPIRIN (ECOTRIN) 81 MG EC TABLET    Take 81 mg by mouth every evening. MD Sylvia Pisano RN; P Brad DASILVA Staff  Caller: Unspecified (Today,  2:44 PM)  As per notes no need to hold ASA.   If he can stop it that is fine. Likely less hematuria after biopsy but not required    ATORVASTATIN (LIPITOR) 80 MG TABLET    TAKE 1 TABLET(80 MG) BY MOUTH EVERY NIGHT    DUTASTERIDE (AVODART) " 0.5 MG CAPSULE    Take 1 capsule (0.5 mg total) by mouth once daily.    EZETIMIBE (ZETIA) 10 MG TABLET    Take 1 tablet (10 mg total) by mouth once daily.    FLUTICASONE PROPIONATE (FLONASE) 50 MCG/ACTUATION NASAL SPRAY    USE 1 SPRAY IN EACH NOSTRIL EVERY DAY    HYDROCORTISONE 2.5 % CREAM    Apply topically 2 (two) times daily. To rectal area    IBUPROFEN (ADVIL,MOTRIN) 200 MG TABLET    Take 200 mg by mouth as needed for Pain. Hold 1 week prior to surgery    MELATONIN ORAL    Take by mouth as needed.    METOPROLOL SUCCINATE (TOPROL-XL) 25 MG 24 HR TABLET    Take 1 tablet (25 mg total) by mouth every evening.    MULTIVIT WITH MINERALS/LUTEIN (MULTIVITAMIN 50 PLUS ORAL)    Take by mouth.    TAMSULOSIN (FLOMAX) 0.4 MG CAP    TAKE 1 CAPSULE(0.4 MG) BY MOUTH EVERY DAY   Discontinued Medications    SERTRALINE (ZOLOFT) 100 MG TABLET    Take 100 mg by mouth Daily.

## 2024-01-31 NOTE — PATIENT INSTRUCTIONS
Try buying Citrucel and taking heaping tsp once a day away from the time he takes medications.    Ask your pharmacist about the RSV vaccine completion of the COVID-19 vaccine series.

## 2024-06-12 ENCOUNTER — LAB VISIT (OUTPATIENT)
Dept: LAB | Facility: HOSPITAL | Age: 79
End: 2024-06-12
Attending: RADIOLOGY
Payer: MEDICARE

## 2024-06-12 DIAGNOSIS — C61 PROSTATE CANCER: ICD-10-CM

## 2024-06-12 LAB — COMPLEXED PSA SERPL-MCNC: 0.06 NG/ML (ref 0–4)

## 2024-06-12 PROCEDURE — 36415 COLL VENOUS BLD VENIPUNCTURE: CPT | Mod: PN | Performed by: RADIOLOGY

## 2024-06-12 PROCEDURE — 84153 ASSAY OF PSA TOTAL: CPT | Performed by: RADIOLOGY

## 2024-06-14 ENCOUNTER — OFFICE VISIT (OUTPATIENT)
Dept: RADIATION ONCOLOGY | Facility: CLINIC | Age: 79
End: 2024-06-14
Payer: MEDICARE

## 2024-06-14 VITALS
TEMPERATURE: 98 F | DIASTOLIC BLOOD PRESSURE: 55 MMHG | WEIGHT: 201.5 LBS | OXYGEN SATURATION: 95 % | HEART RATE: 52 BPM | BODY MASS INDEX: 26.58 KG/M2 | SYSTOLIC BLOOD PRESSURE: 114 MMHG

## 2024-06-14 DIAGNOSIS — R39.12 BENIGN PROSTATIC HYPERPLASIA WITH WEAK URINARY STREAM: Primary | ICD-10-CM

## 2024-06-14 DIAGNOSIS — C61 MALIGNANT NEOPLASM OF PROSTATE: ICD-10-CM

## 2024-06-14 DIAGNOSIS — C61 PROSTATE CANCER: ICD-10-CM

## 2024-06-14 DIAGNOSIS — N40.1 BENIGN PROSTATIC HYPERPLASIA WITH WEAK URINARY STREAM: Primary | ICD-10-CM

## 2024-06-14 PROCEDURE — 99213 OFFICE O/P EST LOW 20 MIN: CPT | Mod: S$PBB,,, | Performed by: RADIOLOGY

## 2024-06-14 PROCEDURE — 99999 PR PBB SHADOW E&M-EST. PATIENT-LVL III: CPT | Mod: PBBFAC,,, | Performed by: RADIOLOGY

## 2024-06-14 PROCEDURE — 99213 OFFICE O/P EST LOW 20 MIN: CPT | Mod: PBBFAC,PN | Performed by: RADIOLOGY

## 2024-06-14 RX ORDER — TAMSULOSIN HYDROCHLORIDE 0.4 MG/1
0.4 CAPSULE ORAL DAILY
Qty: 90 CAPSULE | Refills: 3 | Status: SHIPPED | OUTPATIENT
Start: 2024-06-14

## 2024-06-14 NOTE — PROGRESS NOTES
[facility] Department of Radiation Oncology  Follow Up Visit Note    Diagnosis:  Peter Caldwell is a 78 y.o. male with a(n) low volume, favorable intermediate risk adenocarcinoma of the prostate.  He completed a course of radiation therapy on 10/5/22 to 79.2Gy (due to gland 120cc, decreased from 159 with 6 months pre-medication with ADT).      Oncologic History:  Oncology History   Prostate cancer (Resolved)   3/12/2020 Initial Diagnosis    Prostate cancer     8/20/2021 Cancer Staged    Staging form: Prostate, AJCC 8th Edition  - Clinical stage from 8/20/2021: Stage IIB (cT1c, cN0, cM0, PSA: 9.8, Grade Group: 2)     9/2/2022 - 10/5/2022 Radiation Therapy    Treating physician: Rani Rodriguez  Total Dose: 79.2 Gy to the prostate  Fractions: 44    Treatment Site Ref. ID Energy Dose/Fx (Gy) #Fx Dose Correction (Gy) Total Dose (Gy) Start Date End Date Elapsed Days   IMRT Prost PTV 6X 1.8 44 / 44 0 79.2 8/2/2022 10/5/2022 64             Interval History  The patient presents today for a regularly scheduled follow up visit.  He was last seen in our clinic on 12/12/23.   Since that time, interval PSA on 6/12/24 was 0.06 (on Avodart-> corrected 0.12).  Has not been taking Flomax.    PSA Diagnostic   Latest Ref Rng 0.00 - 4.00 ng/mL   12/7/2021 9.8 (H)    11/28/2022 0.87    6/1/2023 0.63    12/7/2023 0.28    6/12/2024 0.06       Legend:  (H) High    Since completing treatment, his urinary symptoms/LUTS had significantly improved, but again worsened.     IPSS today 22 (21 at last visit, 15 prior to treatment (6-7 while on ADT).    Nocturia x 3  Frequency+++  Incomplete emptying ++++  Urgency +++  Intermittency++  Weak stream ++  Bowel movements regular.  Occasional small blood with straining. No hot flashes        Review of Systems   Review of Systems   Constitutional:  Positive for malaise/fatigue (stable). Negative for chills and fever.   Gastrointestinal:  Positive for blood in stool (rarely,  small with straining). Negative for abdominal pain, constipation and diarrhea.   Genitourinary:  Positive for frequency, hematuria (resolved (only one episode)) and urgency. Negative for dysuria.   Musculoskeletal: Negative.  Negative for back pain, joint pain and neck pain.   Neurological:  Positive for dizziness (occasional lightheadedness, thinks possibly diet related). Negative for headaches.   Psychiatric/Behavioral:  The patient is nervous/anxious.        Social History:  Social History     Tobacco Use    Smoking status: Former     Current packs/day: 0.00     Average packs/day: 0.2 packs/day for 6.0 years (0.9 ttl pk-yrs)     Types: Cigarettes     Start date:      Quit date:      Years since quittin.4    Smokeless tobacco: Never   Substance Use Topics    Alcohol use: Not Currently     Alcohol/week: 1.0 standard drink of alcohol     Types: 1 Glasses of wine per week    Drug use: No       Family History:  Cancer-related family history is not on file.    Exam:  Vitals:    24 1057   BP: (!) 114/55   Pulse: (!) 52   Temp: 98.3 °F (36.8 °C)   SpO2: 95%   Weight: 91.4 kg (201 lb 8 oz)   PF: (!) 16 L/min       Constitutional: Pleasant 78 y.o. male in no acute distress.  Well nourished. Well groomed.   HEENT: Normocephalic and atraumatic   Lungs: No audible wheezing.  Normal effort.   Musculoskeletal: No gross MSK deformities. Ambulates independently  Skin: No rashes appreciated.   Psych: Alert and oriented with somewhat blunted affect.  Neuro:   Grossly normal.      Assessment:  Recovering well from acute radiation therapy-related toxicities  ECOG: (0) Fully active, able to carry on all predisease performance without restriction    Plan:  Follow up in 6 months with PSA  Restart  Flomax due to worsening LUTS; will contact our office if no improvement in 1-2 weeks after starting  Continue Avodart (rx given)  Follow up with Urology for management of LUTS  He was given our contact information, and he  was told that he could call our clinic at anytime if he has any questions or concerns.  Follow up with other providers as directed    23 minutes spent in chart/case review, face-to-face interaction, discussion with other providers, and treatment planning/coordination of care.

## 2024-06-24 ENCOUNTER — PATIENT MESSAGE (OUTPATIENT)
Dept: ADMINISTRATIVE | Facility: OTHER | Age: 79
End: 2024-06-24
Payer: MEDICARE

## 2024-07-10 NOTE — PROGRESS NOTES
Clinic Note  7/10/2024      Subjective:         Chief Complaint:   HPI  Peter Caldwell is a 78 y.o. male  with a history of prostate cancer.. . 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  Negative family history. Flomax for LUTS which is helping his urination. Complains of frequency and urgency. High fluid and caffeine intake with sodas. Denies dysuria, nocturia x1, no hematuria, incontinence. Strong stream. Empties his bladder well. PSA today 9.8; was 9.8 in 03/2021. Had great visit with Dr Jovel in August.     Stage- cT1C  PSA 7.8  PSAD- .057  Biopsy- (8/22/2019) Mary 6 left apex 1/2, 7.7%.  5/13/2021- [place] 3+4 (target 1) 40% ?core count  Volume 138 ccs  MRI(3/10/2020)- Volume 160 ccs. 3 lesions 1) LMATZ 1.6 cm PI-RADS 3;  2) LAATZ 1.6 cm PI-RADS 3;  3)RAPZ 1.2 cm PI-RADS 4. Negative extra prostatic extension, seminal vesicle, bladder, nodes.  MRI 3/11/2021- 159 ccs Target 1 LMPZ 0.9 cm PI-RADS 4, 2 other targets. Negative extra prostatic extension, neurovascular bundle involvement, SVI, nodes. Large gland with median lobe. Target distal aspect 4.8 cms anterior to capsule.   SILVANA score- 3 intermediate  NCCN- fav intermediate  Somatic testing-Prolaris score 3.1    7/11/2024- Patient treated with ADT+EBRT (79.2 Gy completed 10/5/2022). PSA stephane-0.06 6/12/2024.  On Flomax and Avodart for LUTS.  Lab Results   Component Value Date    PSA 4.3 (H) 07/13/2016    PSA 3.9 03/18/2015    PSA 4.35 (H) 03/12/2013    PSA 4.15 (H) 12/12/2012    PSA 2.93 09/10/2012    PSA 4.18 (H) 06/07/2012    PSA 3.1 05/17/2011    PSA 2.6 12/15/2009    PSA 2.0 02/04/2005    PSADIAG 0.06 06/12/2024    PSADIAG 0.28 12/07/2023    PSADIAG 0.63 06/01/2023    PSADIAG 0.87 11/28/2022    PSADIAG 9.8 (H) 12/07/2021    PSADIAG 9.8 (H) 03/10/2021    PSADIAG 6.8 (H) 09/14/2020    PSADIAG 7.8 (H) 03/10/2020    PSADIAG 8.0 (H) 07/30/2019    PSADIAG 9.9 (H) 07/15/2019      Past Medical History:   Diagnosis Date    AAA (abdominal aortic  aneurysm)     Arthritis     BPH (benign prostatic hyperplasia)     Cataract     Colon polyps     Coronary artery disease     Depression     Ex-smoker     Hx of psychiatric care     Lexapro, valium    Mood disorder     chalasani    Old MI (myocardial infarction) 10/07/2022    ELINA (obstructive sleep apnea)     Osteopenia 4/15 aylin     Other insomnia 2019    Prostate cancer 2020    S/P CABG (coronary artery bypass graft) 2013    S/P PTCA (percutaneous transluminal coronary angioplasty) 2013    Skin cancer 05/10/2023    Therapy      Family History   Problem Relation Name Age of Onset    Coronary artery disease Mother      Diabetes Mother      Heart disease Mother      Coronary artery disease Father      Heart disease Father      Heart disease Brother      Heart disease Brother      Schizophrenia Brother      Amblyopia Neg Hx      Blindness Neg Hx      Cataracts Neg Hx      Glaucoma Neg Hx      Macular degeneration Neg Hx      Retinal detachment Neg Hx      Strabismus Neg Hx       Social History     Socioeconomic History    Marital status:      Spouse name: Mary    Number of children: 2   Tobacco Use    Smoking status: Former     Current packs/day: 0.00     Average packs/day: 0.2 packs/day for 6.0 years (0.9 ttl pk-yrs)     Types: Cigarettes     Start date:      Quit date:      Years since quittin.5    Smokeless tobacco: Never   Substance and Sexual Activity    Alcohol use: Not Currently     Alcohol/week: 1.0 standard drink of alcohol     Types: 1 Glasses of wine per week    Drug use: No    Sexual activity: Yes   Social History Narrative    [place] (49 years)    2 daughters (Myah Worthington)    Retired educator/     Social Determinants of Health     Financial Resource Strain: Low Risk  (2024)    Overall Financial Resource Strain (CARDIA)     Difficulty of Paying Living Expenses: Not hard at all   Food Insecurity: No Food Insecurity (2024)     Hunger Vital Sign     Worried About Running Out of Food in the Last Year: Never true     Ran Out of Food in the Last Year: Never true   Transportation Needs: No Transportation Needs (1/24/2024)    PRAPARE - Transportation     Lack of Transportation (Medical): No     Lack of Transportation (Non-Medical): No   Physical Activity: Inactive (1/24/2024)    Exercise Vital Sign     Days of Exercise per Week: 0 days     Minutes of Exercise per Session: 30 min   Stress: Stress Concern Present (1/24/2024)    [employer] [place] [employer] - Occupational Stress Questionnaire     Feeling of Stress : Very much   Housing Stability: Low Risk  (1/24/2024)    Housing Stability Vital Sign     Unable to Pay for Housing in the Last Year: No     Number of Places Lived in the Last Year: 2     Unstable Housing in the Last Year: No     Past Surgical History:   Procedure Laterality Date    BIOPSY WITH TRANSRECTAL ULTRASOUND (TRUS) GUIDANCE N/A 5/13/2021    Procedure: BIOPSY, WITH TRANSRECTAL US GUIDANCE/ URONAV;  Surgeon: Pawel Salinas MD;  Location: [address];  Service: Urology;  Laterality: N/A;  30 min    CATARACT EXTRACTION W/  INTRAOCULAR LENS IMPLANT Left 07/26/2016    Dr. Norman    CATARACT EXTRACTION W/  INTRAOCULAR LENS IMPLANT Right 08/09/2016    Dr. Norman    CHOLECYSTECTOMY      COLONOSCOPY N/A 1/19/2023    Procedure: COLONOSCOPY;  Surgeon: Zachery Ramos MD;  Location: [facility];  Service: Endoscopy;  Laterality: N/A;    CORONARY ANGIOPLASTY      CORONARY ARTERY BYPASS GRAFT      HERNIA REPAIR      ptca      rca     Patient Active Problem List   Diagnosis    Benign essential hypertension    Coronary artery disease involving native coronary artery of native heart with angina pectoris    Familial hypercholesterolemia    BPH (benign prostatic hyperplasia)    S/P CABG (coronary artery bypass graft)    S/P PTCA (percutaneous transluminal coronary angioplasty)    Osteopenia    AAA (abdominal aortic  "aneurysm)    Nuclear sclerosis of both eyes    Vitreous detachment of both eyes    Refractive error    Senile nuclear sclerosis    Nuclear sclerosis of right eye    Short-term memory loss    Decreased hearing of both ears    Right hip pain    Moderate episode of recurrent major depressive disorder    PCO (posterior capsular opacification), bilateral    Pseudophakia    PCO (posterior capsular opacification), right    Adjustment disorder with anxiety    Elevated lipoprotein(a)     Review of Systems      Objective:      There were no vitals taken for this visit.  Estimated body mass index is 26.58 kg/m² as calculated from the following:    Height as of 1/31/24: 6' 1" (1.854 m).    Weight as of 6/14/24: 91.4 kg (201 lb 8 oz).  Physical Exam      Assessment and Plan:           Problem List Items Addressed This Visit       RESOLVED: Prostate cancer - Primary       Follow up:   [facility]  F/U 6-8 weeks/    Pawel Salinas          "

## 2024-07-11 ENCOUNTER — OFFICE VISIT (OUTPATIENT)
Dept: UROLOGY | Facility: CLINIC | Age: 79
End: 2024-07-11
Payer: MEDICARE

## 2024-07-11 VITALS
WEIGHT: 199.75 LBS | BODY MASS INDEX: 26.47 KG/M2 | DIASTOLIC BLOOD PRESSURE: 60 MMHG | HEART RATE: 60 BPM | SYSTOLIC BLOOD PRESSURE: 109 MMHG | HEIGHT: 73 IN

## 2024-07-11 DIAGNOSIS — C61 PROSTATE CANCER: Primary | ICD-10-CM

## 2024-07-11 PROCEDURE — 99213 OFFICE O/P EST LOW 20 MIN: CPT | Mod: PBBFAC | Performed by: UROLOGY

## 2024-07-11 PROCEDURE — 99999 PR PBB SHADOW E&M-EST. PATIENT-LVL III: CPT | Mod: PBBFAC,,, | Performed by: UROLOGY

## 2024-07-11 PROCEDURE — 99214 OFFICE O/P EST MOD 30 MIN: CPT | Mod: S$PBB,,, | Performed by: UROLOGY

## 2024-07-11 RX ORDER — SILODOSIN 8 MG/1
8 CAPSULE ORAL DAILY
Qty: 30 CAPSULE | Refills: 11 | Status: SHIPPED | OUTPATIENT
Start: 2024-07-11 | End: 2025-07-11

## 2024-07-11 RX ORDER — SODIUM FLUORIDE 5 MG/ML
1.1 PASTE, DENTIFRICE DENTAL
COMMUNITY
Start: 2024-06-17

## 2024-07-11 RX ORDER — SERTRALINE HYDROCHLORIDE 100 MG/1
100 TABLET, FILM COATED ORAL
COMMUNITY
Start: 2024-03-28

## 2024-07-11 NOTE — LETTER
July 11, 2024        Rani Rodriguez MD  [address]  [facility] [address]             [facility] - Urology [address] [place] [address]  Phone: [phone]   Patient: Peter Caldwell   MR Number: [id number]   YOB: 1945   Date of Visit: 7/11/2024       Dear Dr. Rodriguez:    Thank you for referring Peter Caldwell to me for evaluation. Attached you will find relevant portions of my assessment and plan of care.    If you have questions, please do not hesitate to call me. I look forward to following Peter Caldwell along with you.    Sincerely,      Pawel Salinas MD            CC    No Recipients    Enclosure

## 2024-07-31 ENCOUNTER — PATIENT MESSAGE (OUTPATIENT)
Dept: FAMILY MEDICINE | Facility: CLINIC | Age: 79
End: 2024-07-31
Payer: MEDICARE

## 2024-08-14 NOTE — PROGRESS NOTES
Clinic Note  8/14/2024      Subjective:         Chief Complaint:   HPI  Peter Caldwell is a 78 y.o. male  with a history of prostate cancer.. . 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  Negative family history. Flomax for LUTS which is helping his urination. Complains of frequency and urgency. High fluid and caffeine intake with sodas. Denies dysuria, nocturia x1, no hematuria, incontinence. Strong stream. Empties his bladder well. PSA today 9.8; was 9.8 in 03/2021. Had great visit with Dr Jovel in August.     Stage- cT1C  PSA 7.8  PSAD- .057  Biopsy- (8/22/2019) Mary 6 left apex 1/2, 7.7%.  5/13/2021- [place] 3+4 (target 1) 40% ?core count  Volume 138 ccs  MRI(3/10/2020)- Volume 160 ccs. 3 lesions 1) LMATZ 1.6 cm PI-RADS 3;  2) LAATZ 1.6 cm PI-RADS 3;  3) RAPZ 1.2 cm PI-RADS 4. Negative extra prostatic extension, seminal vesicle, bladder, nodes.  MRI 3/11/2021- 159 ccs Target 1 LMPZ 0.9 cm PI-RADS 4, 2 other targets. Negative extra prostatic extension, neurovascular bundle involvement, SVI, nodes. Large gland with median lobe. Target distal aspect 4.8 cms anterior to capsule.   SILVANA score- 3 intermediate  NCCN- fav intermediate  Somatic testing-Prolaris score 3.1     7/11/2024- Patient treated with ADT+EBRT (79.2 Gy completed 10/5/2022). PSA stephane-0.06 6/12/2024.  On Flomax and Avodart for LUTS.    8/14/2024- On Rapaflow and Avodart for LUTS.  Post void residual-33 ccs  Bothered by frequency (8-12 x/d) and nocturia (3-4 x/noc)    Lab Results   Component Value Date    PSA 4.3 (H) 07/13/2016    PSA 3.9 03/18/2015    PSA 4.35 (H) 03/12/2013    PSA 4.15 (H) 12/12/2012    PSA 2.93 09/10/2012    PSA 4.18 (H) 06/07/2012    PSA 3.1 05/17/2011    PSA 2.6 12/15/2009    PSA 2.0 02/04/2005    PSADIAG 0.06 06/12/2024    PSADIAG 0.28 12/07/2023    PSADIAG 0.63 06/01/2023    PSADIAG 0.87 11/28/2022    PSADIAG 9.8 (H) 12/07/2021    PSADIAG 9.8 (H) 03/10/2021    PSADIAG 6.8 (H) 09/14/2020    PSADIAG 7.8 (H)  03/10/2020    PSADIAG 8.0 (H) 2019    PSADIAG 9.9 (H) 07/15/2019      Past Medical History:   Diagnosis Date    AAA (abdominal aortic aneurysm)     Arthritis     BPH (benign prostatic hyperplasia)     Cataract     Colon polyps     Coronary artery disease     Depression     Ex-smoker     Hx of psychiatric care     Lexapro, valium    Mood disorder     ziggy    Old MI (myocardial infarction) 10/07/2022    ELINA (obstructive sleep apnea)     Osteopenia 4/15 alyin     Other insomnia 2019    Prostate cancer 2020    S/P CABG (coronary artery bypass graft) 2013    S/P PTCA (percutaneous transluminal coronary angioplasty) 2013    Skin cancer 05/10/2023    Therapy      Family History   Problem Relation Name Age of Onset    Coronary artery disease Mother      Diabetes Mother      Heart disease Mother      Coronary artery disease Father      Heart disease Father      Heart disease Brother      Heart disease Brother      Schizophrenia Brother      Amblyopia Neg Hx      Blindness Neg Hx      Cataracts Neg Hx      Glaucoma Neg Hx      Macular degeneration Neg Hx      Retinal detachment Neg Hx      Strabismus Neg Hx       Social History     Socioeconomic History    Marital status:      Spouse name: Mary    Number of children: 2   Tobacco Use    Smoking status: Former     Current packs/day: 0.00     Average packs/day: 0.2 packs/day for 6.0 years (0.9 ttl pk-yrs)     Types: Cigarettes     Start date:      Quit date:      Years since quittin.6    Smokeless tobacco: Never   Substance and Sexual Activity    Alcohol use: Not Currently     Alcohol/week: 1.0 standard drink of alcohol     Types: 1 Glasses of wine per week    Drug use: No    Sexual activity: Yes   Social History Narrative    [place] (49 years)    2 daughters (Myah Worthington)    Retired educator/     Social Determinants of Health     Financial Resource Strain: Low Risk  (2024)    Overall Financial  Resource Strain (CARDIA)     Difficulty of Paying Living Expenses: Not hard at all   Food Insecurity: No Food Insecurity (1/24/2024)    Hunger Vital Sign     Worried About Running Out of Food in the Last Year: Never true     Ran Out of Food in the Last Year: Never true   Transportation Needs: No Transportation Needs (1/24/2024)    PRAPARE - Transportation     Lack of Transportation (Medical): No     Lack of Transportation (Non-Medical): No   Physical Activity: Inactive (1/24/2024)    Exercise Vital Sign     Days of Exercise per Week: 0 days     Minutes of Exercise per Session: 30 min   Stress: Stress Concern Present (1/24/2024)    [employer] - Occupational Stress Questionnaire     Feeling of Stress : Very much   Housing Stability: Low Risk  (1/24/2024)    Housing Stability Vital Sign     Unable to Pay for Housing in the Last Year: No     Number of Places Lived in the Last Year: 2     Unstable Housing in the Last Year: No     Past Surgical History:   Procedure Laterality Date    BIOPSY WITH TRANSRECTAL ULTRASOUND (TRUS) GUIDANCE N/A 5/13/2021    Procedure: BIOPSY, WITH TRANSRECTAL US GUIDANCE/ URONAV;  Surgeon: Pawel Salinas MD;  Location: [address];  Service: Urology;  Laterality: N/A;  30 min    CATARACT EXTRACTION W/  INTRAOCULAR LENS IMPLANT Left 07/26/2016    Dr. Norman    CATARACT EXTRACTION W/  INTRAOCULAR LENS IMPLANT Right 08/09/2016    Dr. Norman    CHOLECYSTECTOMY      COLONOSCOPY N/A 1/19/2023    Procedure: COLONOSCOPY;  Surgeon: Zachery Ramos MD;  Location: [facility];  Service: Endoscopy;  Laterality: N/A;    CORONARY ANGIOPLASTY      CORONARY ARTERY BYPASS GRAFT      HERNIA REPAIR      ptca      rca     Patient Active Problem List   Diagnosis    Benign essential hypertension    Coronary artery disease involving native coronary artery of native heart with angina pectoris    Familial hypercholesterolemia    BPH (benign prostatic hyperplasia)    S/P CABG  "(coronary artery bypass graft)    S/P PTCA (percutaneous transluminal coronary angioplasty)    Osteopenia    AAA (abdominal aortic aneurysm)    Nuclear sclerosis of both eyes    Vitreous detachment of both eyes    Refractive error    Senile nuclear sclerosis    Nuclear sclerosis of right eye    Short-term memory loss    Decreased hearing of both ears    Right hip pain    Moderate episode of recurrent major depressive disorder    PCO (posterior capsular opacification), bilateral    Pseudophakia    PCO (posterior capsular opacification), right    Adjustment disorder with anxiety    Elevated lipoprotein(a)     Review of Systems      Objective:      There were no vitals taken for this visit.  Estimated body mass index is 26.35 kg/m² as calculated from the following:    Height as of 7/11/24: 6' 1" (1.854 m).    Weight as of 7/11/24: 90.6 kg (199 lb 11.8 oz).  Physical Exam      Assessment and Plan:           Problem List Items Addressed This Visit       BPH (benign prostatic hyperplasia)    RESOLVED: Prostate cancer - Primary       Follow up:   Continue prostate cancer follow up and monitoring.  I will consult Dr. Segovia to assess and manage his urinary complaints (OAB vs radiation cystitis).  Letter to Arcadio Delaney and Juliette.  1 year with PSA.    Pawel Salinas"

## 2024-08-15 ENCOUNTER — OFFICE VISIT (OUTPATIENT)
Dept: UROLOGY | Facility: CLINIC | Age: 79
End: 2024-08-15
Payer: MEDICARE

## 2024-08-15 ENCOUNTER — TELEPHONE (OUTPATIENT)
Dept: UROLOGY | Facility: CLINIC | Age: 79
End: 2024-08-15
Payer: MEDICARE

## 2024-08-15 VITALS
BODY MASS INDEX: 26.24 KG/M2 | DIASTOLIC BLOOD PRESSURE: 59 MMHG | WEIGHT: 198 LBS | HEART RATE: 60 BPM | HEIGHT: 73 IN | SYSTOLIC BLOOD PRESSURE: 119 MMHG

## 2024-08-15 DIAGNOSIS — C61 PROSTATE CANCER: Primary | ICD-10-CM

## 2024-08-15 DIAGNOSIS — N40.1 BENIGN PROSTATIC HYPERPLASIA WITH LOWER URINARY TRACT SYMPTOMS, SYMPTOM DETAILS UNSPECIFIED: ICD-10-CM

## 2024-08-15 DIAGNOSIS — N32.81 OAB (OVERACTIVE BLADDER): ICD-10-CM

## 2024-08-15 PROCEDURE — 99999 PR PBB SHADOW E&M-EST. PATIENT-LVL IV: CPT | Mod: PBBFAC,,, | Performed by: UROLOGY

## 2024-08-15 PROCEDURE — 99214 OFFICE O/P EST MOD 30 MIN: CPT | Mod: PBBFAC | Performed by: UROLOGY

## 2024-08-15 PROCEDURE — 99214 OFFICE O/P EST MOD 30 MIN: CPT | Mod: S$PBB,,, | Performed by: UROLOGY

## 2024-08-15 PROCEDURE — G2211 COMPLEX E/M VISIT ADD ON: HCPCS | Mod: S$PBB,,, | Performed by: UROLOGY

## 2024-08-15 NOTE — LETTER
August 15, 2024        Ricky Delaney MD  [address]  [facility] [address]             [facility] - Urology 2nd Fl  [address]  [place] [address]  Phone: [phone]   Patient: Peter Caldwell   MR Number: [id number]   YOB: 1945   Date of Visit: 8/15/2024       Dear Dr. Delaney:    Thank you for referring Peter Caldwell to me for evaluation. Attached you will find relevant portions of my assessment and plan of care.    If you have questions, please do not hesitate to call me. I look forward to following Peter Caldwell along with you.    Sincerely,      Pawel Salinas MD            CC    No Recipients    Enclosure

## 2024-08-15 NOTE — TELEPHONE ENCOUNTER
Scheduled appt with Dr. Segovia on 8/20 at 1:20 pm    ----- Message from Floyd Segovia MD sent at 8/15/2024 11:36 AM CDT -----  Susana,  Can you please schedule for a new patient appt with me.   Thanks,  CG  ----- Message -----  From: Pawel Salinas MD  Sent: 8/15/2024  11:30 AM CDT  To: Floyd Segovia MD; Ricky Delaney MD

## 2024-08-16 ENCOUNTER — PATIENT MESSAGE (OUTPATIENT)
Dept: OTHER | Facility: OTHER | Age: 79
End: 2024-08-16
Payer: MEDICARE

## 2024-08-17 ENCOUNTER — PATIENT MESSAGE (OUTPATIENT)
Dept: OTHER | Facility: OTHER | Age: 79
End: 2024-08-17
Payer: MEDICARE

## 2024-08-20 ENCOUNTER — OFFICE VISIT (OUTPATIENT)
Dept: UROLOGY | Facility: CLINIC | Age: 79
End: 2024-08-20
Payer: MEDICARE

## 2024-08-20 VITALS
WEIGHT: 201.75 LBS | DIASTOLIC BLOOD PRESSURE: 71 MMHG | SYSTOLIC BLOOD PRESSURE: 116 MMHG | HEART RATE: 66 BPM | BODY MASS INDEX: 26.61 KG/M2

## 2024-08-20 DIAGNOSIS — N32.81 OAB (OVERACTIVE BLADDER): Primary | ICD-10-CM

## 2024-08-20 PROCEDURE — 99999 PR PBB SHADOW E&M-EST. PATIENT-LVL III: CPT | Mod: PBBFAC,,, | Performed by: UROLOGY

## 2024-08-20 PROCEDURE — 99213 OFFICE O/P EST LOW 20 MIN: CPT | Mod: PBBFAC | Performed by: UROLOGY

## 2024-08-20 RX ORDER — LIDOCAINE HYDROCHLORIDE 20 MG/ML
JELLY TOPICAL ONCE
OUTPATIENT
Start: 2024-08-20 | End: 2024-08-20

## 2024-08-20 NOTE — PROGRESS NOTES
[facility] of U[facility]      New Overactive Bladder (OAB) Note    8/21/2024    Referred by:  Pawel Salinas MD    History of Present Illness    Mr. Caldwell presents today for follow up of urinary frequency and nocturia. He has not seen a [facility]-certified specialist in our practice.     He reports urinary frequency of 8-12 times per day and 3-4 times at night. Symptoms began immediately after radiation therapy but have become more acute over time. He experiences urgency upon standing, noting immediate pressure and need to urinate when rising from a seated position. He also reports incomplete bladder emptying, often needing to return to the bathroom 2-3 minutes after initial urination. Despite these symptoms, he states he has a good urinary stream and denies difficulty initiating urination. He occasionally experiences minor urinary leakage, particularly when initiating urination, and wears pads as a precaution.    He has a history of prostate cancer. He underwent radical prostatectomy in September, followed by external beam radiation therapy (EBRT) with androgen deprivation therapy in 2024. His PSA levels nadired after treatment.    He is currently taking Flomax and Avodart for lower urinary tract symptoms.    He reports increased fluid consumption during summer months, including Pedialyte, Gatorade, lemonade, and various teas. He expresses concern that this may be contributing to his urinary symptoms, but acknowledges that the change in his urination pattern is likely not solely due to increased fluid consumption.    He reports a history of hemorrhoids with associated hematuria in the distant past, which has since resolved. He denies any history of kidney stones or urinary tract infections.        A review of 10+ systems was conducted with pertinent positive and negative findings documented in HPI with all other systems reviewed and negative.    Past medical, family, surgical and social history reviewed as  documented in chart with pertinent positive medical, family, surgical and social history detailed in HPI.    Exam Findings:    Physical Exam    General: No acute distress. Nontoxic appearing.  HENT: Normocephalic. Atraumatic.  Respiratory: Normal respiratory effort. No conversational dyspnea. No audible wheezing.  Abdomen: No obvious distension.  Skin: No visible abnormalities.  Extremities: No edema upper extremities. No edema lower extremities.  Neurological: Alert and oriented x3. Normal speech.  Psychiatric: Normal mood. Normal affect. No evidence of SI.          Assessment/Plan:    - Evaluated urinary frequency symptoms (8-12 times/day, 3-4 times/night) post-radiation therapy for prostate cancer  - Considered potential causes: bladder irritation from radiation or urinary outflow obstruction  - Planned urodynamic testing to assess bladder pressure and function  - Considered cystoscopy to evaluate for radiation-induced bladder inflammation  URINARY SYMPTOMS:  - Explained urodynamic testing procedure: catheter insertion, bladder filling, pressure measurement during urination.  - Discussed that current urinary frequency is not likely due to fluid intake alone.  - Reviewed potential causes of symptoms: bladder irritation from radiation or urinary outflow obstruction.  - Urodynamic testing ordered.  - Cystoscopy ordered.  FOLLOW UP:  - Scheduled urodynamic testing and cystoscopy for next visit.

## 2024-09-03 ENCOUNTER — OFFICE VISIT (OUTPATIENT)
Dept: FAMILY MEDICINE | Facility: CLINIC | Age: 79
End: 2024-09-03
Payer: MEDICARE

## 2024-09-03 VITALS
BODY MASS INDEX: 26.47 KG/M2 | DIASTOLIC BLOOD PRESSURE: 64 MMHG | HEIGHT: 73 IN | SYSTOLIC BLOOD PRESSURE: 120 MMHG | OXYGEN SATURATION: 95 % | HEART RATE: 65 BPM | WEIGHT: 199.75 LBS

## 2024-09-03 DIAGNOSIS — R09.81 NASAL CONGESTION: ICD-10-CM

## 2024-09-03 DIAGNOSIS — E78.01 FAMILIAL HYPERCHOLESTEROLEMIA: ICD-10-CM

## 2024-09-03 DIAGNOSIS — I25.119 CORONARY ARTERY DISEASE INVOLVING NATIVE CORONARY ARTERY OF NATIVE HEART WITH ANGINA PECTORIS: Chronic | ICD-10-CM

## 2024-09-03 DIAGNOSIS — R32 URINARY INCONTINENCE, UNSPECIFIED TYPE: ICD-10-CM

## 2024-09-03 DIAGNOSIS — R26.9 GAIT DISTURBANCE: Primary | ICD-10-CM

## 2024-09-03 PROCEDURE — 99999 PR PBB SHADOW E&M-EST. PATIENT-LVL IV: CPT | Mod: PBBFAC,,, | Performed by: FAMILY MEDICINE

## 2024-09-03 PROCEDURE — 99214 OFFICE O/P EST MOD 30 MIN: CPT | Mod: S$PBB,,, | Performed by: FAMILY MEDICINE

## 2024-09-03 PROCEDURE — 99214 OFFICE O/P EST MOD 30 MIN: CPT | Mod: PBBFAC,PO | Performed by: FAMILY MEDICINE

## 2024-09-03 NOTE — PROGRESS NOTES
"Subjective:       Patient ID: Peter Caldwell is a 78 y.o. male.    Chief Complaint: Follow-up    HPI:  Pleasant 78-year-old patient is here today for his annual appears.  He presents with a list of symptoms that he has been having.  We have addressed those today.  Rhinitis more than likely would respond to the Flonase is using as long as he uses the appropriate technique get that does not work will try Astelin nose spray 2 puffs each nostril twice daily.  We could he has been added onto his regimen.    He was stable coronary artery disease status post bypass quite awhile ago.  He was followed by Cardiology and currently has no active symptoms of CAD or CHF.  He has been treated for hypercholesterolemia.  He is due for lipid panel.    The patient has just undergone treatment for early prostate cancer he also has developed urinary incontinence for which she was seen Urology in his now been referred to a specialist and incontinence.  That this is coming up soon.      Patient also has mood disturbance that the patient classifies as depression concerning the loss of most if not all of his family members at early age is due to coronary artery disease.  He we will be seeing Psychiatry virtually.  He is not suicidal.  I would say that his depression is more of a sadness then and major depressive disorder type of a problem.    Review of Systems   Constitutional: Negative.    HENT:  Positive for postnasal drip and rhinorrhea.    Eyes: Negative.    Respiratory: Negative.     Cardiovascular: Negative.    Gastrointestinal: Negative.    Endocrine: Negative.    Genitourinary: Negative.    Musculoskeletal: Negative.    Skin: Negative.    Allergic/Immunologic: Negative.    Neurological: Negative.    Hematological: Negative.    Psychiatric/Behavioral: Negative.         Objective:      Vitals:    09/03/24 1308   BP: 120/64   Pulse: 65   SpO2: 95%   Weight: 90.6 kg (199 lb 11.8 oz)   Height: 6' 1" (1.854 m)      Physical Exam  Vitals " and nursing note reviewed.   Constitutional:       Appearance: Normal appearance. He is obese.   HENT:      Head: Normocephalic and atraumatic.      Nose: Nose normal.      Mouth/Throat:      Mouth: Mucous membranes are moist.      Pharynx: Oropharynx is clear.   Eyes:      Extraocular Movements: Extraocular movements intact.      Conjunctiva/sclera: Conjunctivae normal.      Pupils: Pupils are equal, round, and reactive to light.   Cardiovascular:      Rate and Rhythm: Normal rate and regular rhythm.   Pulmonary:      Effort: Pulmonary effort is normal.      Breath sounds: Normal breath sounds.   Abdominal:      General: Abdomen is flat. Bowel sounds are normal.      Palpations: Abdomen is soft.   Musculoskeletal:         General: Normal range of motion.      Cervical back: Normal range of motion and neck supple.   Skin:     General: Skin is warm and dry.      Capillary Refill: Capillary refill takes less than 2 seconds.   Neurological:      General: No focal deficit present.      Mental Status: He is alert and oriented to person, place, and time.   Psychiatric:         Mood and Affect: Mood normal.         Behavior: Behavior normal.         Thought Content: Thought content normal.         Judgment: Judgment normal.         Results for orders placed or performed in visit on 06/12/24   Prostate Specific Antigen, Diagnostic   Result Value Ref Range    PSA Diagnostic 0.06 0.00 - 4.00 ng/mL      Assessment:       1. Gait disturbance    2. Coronary artery disease involving native coronary artery of native heart with angina pectoris    3. Familial hypercholesterolemia    4. Nasal congestion    5. Urinary incontinence, unspecified type        Plan:       Gait disturbance  -     Ambulatory referral/consult to Physical/Occupational Therapy; Future; Expected date: 09/10/2024  -     CBC Auto Differential; Future; Expected date: 09/03/2024    Coronary artery disease involving native coronary artery of native heart with angina  pectoris  -     CBC Auto Differential; Future; Expected date: 09/03/2024  -     Comprehensive Metabolic Panel; Future; Expected date: 09/03/2024    Familial hypercholesterolemia  -     Lipid Panel; Future; Expected date: 09/03/2024    Nasal congestion  Comments:  Continue Flonase with the appropriate application let me know if Astelin nose spray is something you are interested in later    Urinary incontinence, unspecified type  Comments:  Follow the advice of Urology and once the cause of the incontinence is known they should have    a good treatment plan.          Medication List with Changes/Refills   Current Medications    ACETAMINOPHEN (TYLENOL) 325 MG TABLET    Take 325 mg by mouth every 6 (six) hours as needed for Pain.    ASPIRIN (ECOTRIN) 81 MG EC TABLET    Take 81 mg by mouth every evening. MD Sylvia Pisano, RN; [facility] Staff  Caller: Unspecified (Today,  2:44 PM)  As per notes no need to hold ASA.   If he can stop it that is fine. Likely less hematuria after biopsy but not required    ATORVASTATIN (LIPITOR) 80 MG TABLET    TAKE 1 TABLET(80 MG) BY MOUTH EVERY NIGHT    DUTASTERIDE (AVODART) 0.5 MG CAPSULE    Take 1 capsule (0.5 mg total) by mouth once daily.    EZETIMIBE (ZETIA) 10 MG TABLET    Take 1 tablet (10 mg total) by mouth once daily.    FLUTICASONE PROPIONATE (FLONASE) 50 MCG/ACTUATION NASAL SPRAY    USE 1 SPRAY IN EACH NOSTRIL EVERY DAY    IBUPROFEN (ADVIL,MOTRIN) 200 MG TABLET    Take 200 mg by mouth as needed for Pain. Hold 1 week prior to surgery    METOPROLOL SUCCINATE (TOPROL-XL) 25 MG 24 HR TABLET    Take 1 tablet (25 mg total) by mouth every evening.    MULTIVIT WITH MINERALS/LUTEIN (MULTIVITAMIN 50 PLUS ORAL)    Take by mouth.    PREVIDENT 5000 PLUS 1.1 % CREA    1.1 %.    SERTRALINE (ZOLOFT) 100 MG TABLET    Take 2 tablets (200 mg total) by mouth once daily.    SILODOSIN (RAPAFLO) 8 MG CAP CAPSULE    Take 1 capsule (8 mg total) by mouth once daily.    VIT  A/VIT C/VIT E/ZINC/COPPER (ICAPS AREDS ORAL)    Take by mouth.   Discontinued Medications    HYDROCORTISONE 2.5 % CREAM    Apply topically 2 (two) times daily. To rectal area    MELATONIN ORAL    Take by mouth as needed.    SERTRALINE (ZOLOFT) 100 MG TABLET    Take 100 mg by mouth.

## 2024-09-14 DIAGNOSIS — E78.00 PURE HYPERCHOLESTEROLEMIA: ICD-10-CM

## 2024-09-16 ENCOUNTER — LAB VISIT (OUTPATIENT)
Dept: LAB | Facility: HOSPITAL | Age: 79
End: 2024-09-16
Payer: MEDICARE

## 2024-09-16 DIAGNOSIS — E78.01 FAMILIAL HYPERCHOLESTEROLEMIA: ICD-10-CM

## 2024-09-16 DIAGNOSIS — R26.9 GAIT DISTURBANCE: ICD-10-CM

## 2024-09-16 DIAGNOSIS — I25.119 CORONARY ARTERY DISEASE INVOLVING NATIVE CORONARY ARTERY OF NATIVE HEART WITH ANGINA PECTORIS: Chronic | ICD-10-CM

## 2024-09-16 LAB
ALBUMIN SERPL BCP-MCNC: 3.6 G/DL (ref 3.5–5.2)
ALP SERPL-CCNC: 61 U/L (ref 55–135)
ALT SERPL W/O P-5'-P-CCNC: 33 U/L (ref 10–44)
ANION GAP SERPL CALC-SCNC: 10 MMOL/L (ref 8–16)
AST SERPL-CCNC: 35 U/L (ref 10–40)
BASOPHILS # BLD AUTO: 0.04 K/UL (ref 0–0.2)
BASOPHILS NFR BLD: 0.6 % (ref 0–1.9)
BILIRUB SERPL-MCNC: 0.7 MG/DL (ref 0.1–1)
BUN SERPL-MCNC: 12 MG/DL (ref 8–23)
CALCIUM SERPL-MCNC: 9.1 MG/DL (ref 8.7–10.5)
CHLORIDE SERPL-SCNC: 107 MMOL/L (ref 95–110)
CHOLEST SERPL-MCNC: 122 MG/DL (ref 120–199)
CHOLEST/HDLC SERPL: 3.9 {RATIO} (ref 2–5)
CO2 SERPL-SCNC: 23 MMOL/L (ref 23–29)
CREAT SERPL-MCNC: 1 MG/DL (ref 0.5–1.4)
DIFFERENTIAL METHOD BLD: ABNORMAL
EOSINOPHIL # BLD AUTO: 0.1 K/UL (ref 0–0.5)
EOSINOPHIL NFR BLD: 1 % (ref 0–8)
ERYTHROCYTE [DISTWIDTH] IN BLOOD BY AUTOMATED COUNT: 14.3 % (ref 11.5–14.5)
EST. GFR  (NO RACE VARIABLE): >60 ML/MIN/1.73 M^2
GLUCOSE SERPL-MCNC: 103 MG/DL (ref 70–110)
HCT VFR BLD AUTO: 42.5 % (ref 40–54)
HDLC SERPL-MCNC: 31 MG/DL (ref 40–75)
HDLC SERPL: 25.4 % (ref 20–50)
HGB BLD-MCNC: 13.8 G/DL (ref 14–18)
IMM GRANULOCYTES # BLD AUTO: 0.01 K/UL (ref 0–0.04)
IMM GRANULOCYTES NFR BLD AUTO: 0.2 % (ref 0–0.5)
LDLC SERPL CALC-MCNC: 66.8 MG/DL (ref 63–159)
LYMPHOCYTES # BLD AUTO: 0.7 K/UL (ref 1–4.8)
LYMPHOCYTES NFR BLD: 11.9 % (ref 18–48)
MCH RBC QN AUTO: 30.5 PG (ref 27–31)
MCHC RBC AUTO-ENTMCNC: 32.5 G/DL (ref 32–36)
MCV RBC AUTO: 94 FL (ref 82–98)
MONOCYTES # BLD AUTO: 0.6 K/UL (ref 0.3–1)
MONOCYTES NFR BLD: 10.3 % (ref 4–15)
NEUTROPHILS # BLD AUTO: 4.7 K/UL (ref 1.8–7.7)
NEUTROPHILS NFR BLD: 76 % (ref 38–73)
NONHDLC SERPL-MCNC: 91 MG/DL
NRBC BLD-RTO: 0 /100 WBC
PLATELET # BLD AUTO: 161 K/UL (ref 150–450)
PMV BLD AUTO: 9.9 FL (ref 9.2–12.9)
POTASSIUM SERPL-SCNC: 4.6 MMOL/L (ref 3.5–5.1)
PROT SERPL-MCNC: 6.3 G/DL (ref 6–8.4)
RBC # BLD AUTO: 4.52 M/UL (ref 4.6–6.2)
SODIUM SERPL-SCNC: 140 MMOL/L (ref 136–145)
TRIGL SERPL-MCNC: 121 MG/DL (ref 30–150)
WBC # BLD AUTO: 6.2 K/UL (ref 3.9–12.7)

## 2024-09-16 PROCEDURE — 80061 LIPID PANEL: CPT | Performed by: FAMILY MEDICINE

## 2024-09-16 PROCEDURE — 36415 COLL VENOUS BLD VENIPUNCTURE: CPT | Mod: PO | Performed by: FAMILY MEDICINE

## 2024-09-16 PROCEDURE — 85025 COMPLETE CBC W/AUTO DIFF WBC: CPT | Performed by: FAMILY MEDICINE

## 2024-09-16 PROCEDURE — 80053 COMPREHEN METABOLIC PANEL: CPT | Performed by: FAMILY MEDICINE

## 2024-09-16 RX ORDER — EZETIMIBE 10 MG/1
10 TABLET ORAL
Qty: 90 TABLET | Refills: 3 | Status: SHIPPED | OUTPATIENT
Start: 2024-09-16

## 2024-09-23 ENCOUNTER — TELEPHONE (OUTPATIENT)
Dept: UROLOGY | Facility: CLINIC | Age: 79
End: 2024-09-23
Payer: MEDICARE

## 2024-09-23 NOTE — TELEPHONE ENCOUNTER
"Called pt in regards to mychart message left vm   ----- Message from Elenita Mata sent at 9/23/2024  2:28 PM CDT -----  Regarding: pt advice  Contact: [phone]  .Name Of Caller: Self     Contact Preference?: [phone]     What is the nature of the call?:    Pt needing to know does pt needs a full bladder for appt 9/24/24/pls freddie   Additional Notes:  "Thank you for all that you do for our patients"  "

## 2024-09-24 ENCOUNTER — HOSPITAL ENCOUNTER (OUTPATIENT)
Dept: RADIOLOGY | Facility: HOSPITAL | Age: 79
Discharge: HOME | End: 2024-09-24
Attending: UROLOGY
Payer: MEDICARE

## 2024-09-24 ENCOUNTER — PROCEDURE VISIT (OUTPATIENT)
Facility: CLINIC | Age: 79
End: 2024-09-24
Payer: MEDICARE

## 2024-09-24 VITALS
HEIGHT: 73 IN | BODY MASS INDEX: 26.24 KG/M2 | RESPIRATION RATE: 18 BRPM | WEIGHT: 198 LBS | HEART RATE: 64 BPM | DIASTOLIC BLOOD PRESSURE: 63 MMHG | SYSTOLIC BLOOD PRESSURE: 110 MMHG | TEMPERATURE: 98 F

## 2024-09-24 DIAGNOSIS — N32.81 OAB (OVERACTIVE BLADDER): ICD-10-CM

## 2024-09-24 PROCEDURE — 52000 CYSTOURETHROSCOPY: CPT | Mod: PBBFAC | Performed by: UROLOGY

## 2024-09-24 PROCEDURE — 51741 ELECTRO-UROFLOWMETRY FIRST: CPT | Mod: PBBFAC | Performed by: UROLOGY

## 2024-09-24 PROCEDURE — 51729 CYSTOMETROGRAM W/VP&UP: CPT | Mod: PBBFAC | Performed by: UROLOGY

## 2024-09-24 PROCEDURE — 51600 INJECTION FOR BLADDER X-RAY: CPT | Mod: PBBFAC | Performed by: UROLOGY

## 2024-09-24 PROCEDURE — 99999PBSHW PR PBB SHADOW TECHNICAL ONLY FILED TO HB: Mod: PBBFAC,,,

## 2024-09-24 PROCEDURE — 74450 X-RAY URETHRA/BLADDER: CPT | Mod: TC

## 2024-09-24 PROCEDURE — 51784 ANAL/URINARY MUSCLE STUDY: CPT | Mod: PBBFAC | Performed by: UROLOGY

## 2024-09-24 PROCEDURE — 51797 INTRAABDOMINAL PRESSURE TEST: CPT | Mod: PBBFAC | Performed by: UROLOGY

## 2024-09-24 RX ORDER — LIDOCAINE HYDROCHLORIDE 20 MG/ML
JELLY TOPICAL
Status: COMPLETED | OUTPATIENT
Start: 2024-09-24 | End: 2024-09-24

## 2024-09-24 RX ADMIN — LIDOCAINE HYDROCHLORIDE: 20 JELLY TOPICAL at 01:09

## 2024-09-24 RX ADMIN — IOHEXOL 100 ML: 350 INJECTION, SOLUTION INTRAVENOUS at 02:09

## 2024-09-24 NOTE — PROCEDURES
Urodynamic Report    [facility]       Referring Physician:  Floyd Segovia MD    YOB: 1945  Date of Exam: 9/24/2024    HPI: Mr. Caldwell presents today for follow up of urinary frequency and nocturia.      He reports urinary frequency of 8-12 times per day and 3-4 times at night. Symptoms began immediately after radiation therapy but have become more acute over time. He experiences urgency upon standing, noting immediate pressure and need to urinate when rising from a seated position. He also reports incomplete bladder emptying, often needing to return to the bathroom 2-3 minutes after initial urination. Despite these symptoms, he states he has a good urinary stream and denies difficulty initiating urination. He occasionally experiences minor urinary leakage, particularly when initiating urination, and wears pads as a precaution.     He has a history of prostate cancer. He was treated with ADT and EBRT completed in 2022. PSA stephane 0.06 in 2024.      He is currently taking Flomax and Avodart for lower urinary tract symptoms.     He reports increased fluid consumption during summer months, including Pedialyte, Gatorade, lemonade, and various teas. He expresses concern that this may be contributing to his urinary symptoms, but acknowledges that the change in his urination pattern is likely not solely due to increased fluid consumption.       Cystometrogram:    Position: Sitting  Filling Rate: 20 mL/sec   Catheter: 7F  Fluid:Conray       Cath PVR prior: 100 mL Voiding Diary Capacity: Not Available   First Sensation: 120 mL First Desire: 150 mL   Strong Desire: 157 mL Cystometric Capacity: 168 mL       Pdet at FPC: 2 cm H2O Compliance: Normal       Detrusor Overactivity (DO): Present Volume first DO:  150 mL   Max DO Pressure:  15 cmH2O Urgency Incontinence: Absent        Leak Point Pressure Testing:        Volume Tested: 150 mL  Abdominal Leak Point Pressure: No Leak   Stress  Induced DO: Absent          Voiding Study:        Voided Volume: 64 mL PVR: 104 mL   Maximum Flow: 4 mL/sec Average Flow 2 mL/sec   Max Pdet: 113 cmH2O  Pdet at Max Flow: 113 cmH2O   EMG Storage: Normal Recruitment  EMG Voiding: Normal quiescence       Fluroscopic Imaging:        Bladder Contour: moderate trabeculation Vesicoureteral Reflux:No VUR   Bladder Neck at Rest: sunrise sign Bladder Neck Voiding:Narrowed - Fixed       Impression:        Sensation:Early    Capacity: Small    Compliance:Normal    Detrusor Overactivity:Present - No Leak    Continence: No Incontinence    Contractility: Bladder Outlet Obstruction    Emptying:Unsatisfactory - Bladder Outlet Obstruction    Coordination:Coordinated Voiding          Summary:  This study was performed in accordance with the current AUA/SUFU Guideline on Adult Urodynamics. On filling phase he demonstrates early first and strong desire with a small bladder capacity. There is detrusor overactivity (DO) demonstrated on this exam (though absence of DO on urodynamic evaluation does not exclude it as causative agent of urgency symptoms). Bladder compliance at capacity is normal. On fluoroscopy, the bladder contour is  shows indentation of the prostate into the bladder neck and base (sunrise sign) . There is no VUR demonstrated on this exam.     On stress testing, with adequate cough and valsalva effort, there is no KRISTAN demonstrated and patient reports no clinical history of KRISTAN.    On voiding phase,  voiding pressures are significantly elevated relative to an attenuated flow. His bladder outlet obstruction index (BOOI = 105) is well into the range consistent with bladder outlet obstruction. On VCUG, there is sufficient flow to clearly establish the prostatic urethra as the site of obstruction (see PACS images) . This is his normal voiding pattern      Cystoscopy Details: Informed consent was obtained and he was sterily prepped and 1% lidocaine jelly was injected per  urethra. A flexible cystoscope was inserted into the bladder via the urethra. There was no evidence of stricture, stenosis, lesions, other obstruction, or diverticulum. Significant findings included bilateral lateral prostatic lobes highly suggestive of significant obstruction, protruding into the bladder neck; significant changes consistent with radiation throughout prostatic urethra. Cystoscopic examination of the bladder revealed orthotopically positioned, normal bilateral ureteral orifices with clear yellow urine effluxing from each orifice. All mucosal surfaces were examined with no apparent stones, tumors, foreign bodies, erythema, trabeculation, diverticula, or ulcers. The procedure was concluded without complications. No evidence of significant radiation cystitis.  The patient was not administered a post-procedure antibiotic.

## 2024-09-24 NOTE — PATIENT INSTRUCTIONS
SIMPLE URODYNAMIC STUDY (SUDS) CYSTOSCOPY  FLUORO URODYNAMIC STUDY (FUDS) CYSTOSCOPY  DISCHARGE INSTRUCTIONS    You have had a procedure that will require time to properly heal. Follow the instructions you have been given on how to care for yourself once you are home. Below is additional information to help in your recovery.    ACTIVITY  There are no restrictions in activity. Start doing again the things you did before the procedure.  You may experience a slight burning sensation and notice a small amount of blood in your urine after your procedure. This will clear up within a day. Call the clinic if it continues beyond 48 hours.  If you are sent home with a catheter in place, only take showers until the catheter is removed.    DIET  Continue your normal diet. You may eat the same foods you ate before your procedure.  Drink plenty of fluids during the first 24 to 48 hours following your procedure.    MEDICATIONS  Resume all other previous medications from your prescribing physician.  Continue any pre-procedure antibiotics until they are all gone.    SIGNS AND SYMPTOMS TO REPORT TO THE DOCTOR  Chills or fever greater than 101° F within 24 hours of procedure.  Changes in urination, such as increased bleeding, foul smell, cloudy urine, or painful urination.  Call your doctor with any questions or concerns.    For any emergency situation, call 911 immediately or go to your nearest emergency room.    [facility]  [phone]

## 2024-09-25 ENCOUNTER — TELEPHONE (OUTPATIENT)
Dept: UROLOGY | Facility: CLINIC | Age: 79
End: 2024-09-25
Payer: MEDICARE

## 2024-09-25 NOTE — TELEPHONE ENCOUNTER
Left vm for pt stating that we had received a referral from Dr. Segovia and scheduled appt time and date for pt. Advised that if that date and time did not work to call office back for rescheduling. Call back number left.

## 2024-09-25 NOTE — TELEPHONE ENCOUNTER
----- Message from Saray Guardado sent at 9/25/2024  1:08 PM CDT -----  Regarding: Pt is returning a call from RA MORRIS regarding an appt that was scheduled for him that doesn't work and pt would like a call back today asap  Type:  Patient Returning Call    Who Called: Pt is returning a call from RA MORRIS regarding an appt that was scheduled for him that doesn't work and pt would like a call back today asap    Would the patient rather a call back or a response via [employer]? Call Back    Best Call Back Number:[phone]

## 2024-10-03 DIAGNOSIS — E78.00 PURE HYPERCHOLESTEROLEMIA: ICD-10-CM

## 2024-10-03 RX ORDER — ATORVASTATIN CALCIUM 80 MG/1
TABLET, FILM COATED ORAL
Qty: 90 TABLET | Refills: 3 | Status: SHIPPED | OUTPATIENT
Start: 2024-10-03

## 2024-10-10 ENCOUNTER — OFFICE VISIT (OUTPATIENT)
Dept: UROLOGY | Facility: CLINIC | Age: 79
End: 2024-10-10
Payer: MEDICARE

## 2024-10-10 VITALS
BODY MASS INDEX: 26.05 KG/M2 | HEIGHT: 73 IN | WEIGHT: 196.56 LBS | SYSTOLIC BLOOD PRESSURE: 129 MMHG | DIASTOLIC BLOOD PRESSURE: 73 MMHG | HEART RATE: 60 BPM

## 2024-10-10 DIAGNOSIS — N40.1 BENIGN PROSTATIC HYPERPLASIA WITH LOWER URINARY TRACT SYMPTOMS, SYMPTOM DETAILS UNSPECIFIED: ICD-10-CM

## 2024-10-10 DIAGNOSIS — N32.81 OAB (OVERACTIVE BLADDER): Primary | ICD-10-CM

## 2024-10-10 DIAGNOSIS — C61 PROSTATE CANCER: ICD-10-CM

## 2024-10-10 PROCEDURE — 99999 PR PBB SHADOW E&M-EST. PATIENT-LVL IV: CPT | Mod: PBBFAC,,, | Performed by: UROLOGY

## 2024-10-10 PROCEDURE — 99214 OFFICE O/P EST MOD 30 MIN: CPT | Mod: PBBFAC,PO | Performed by: UROLOGY

## 2024-10-10 NOTE — PATIENT INSTRUCTIONS
Holmium laser enucleation of the prostate (HoLEP): procedure-specific information    What is the evidence base for this information?  This leaflet includes advice from the [employer] guidelines in the management of benign prostate enlargement (BPH). It is therefore reflective of best practice in the care of this condition.  It is intended to supplement any advice you may already have been given by your urologist or other healthcare professionals. Alternative treatments are outlined below and can be discussed in more detail with your Urologist or Nurse Practitioner.    What does the procedure involve?  This operation involves the telescopic removal of obstructing prostate tissue using a laser and temporary insertion of a catheter.    What are the alternatives to this procedure?  Drugs, use of a catheter/stent, observation, conventional transurethral resection, other lasers that vaporize the prostate tissue or an open or robotic/laparoscopic operation.          What should I expect before the procedure?  If you are taking Clopidogrel or other new generation platelet inhibitors such as Pradaxa or Elaquis, on a regular basis, you must stop 10 days before your admission. This drug can cause increased bleeding after prostate surgery. Please inform your urologist if you have been instructed to not stop blood-thinning medications for any reason or if you have had cardiac stent placement within the last year. Treatment can be re-started safely about 10 days after you get home. If you are taking Warfarin to thin your blood, you should ensure that the Urology staff are aware of this well in advance of your admission.    If you live locally, you will usually be able to be discharged home on the day of your surgery.  If you have travelled from outside the local area for surgery, typically you will be kept in hospital overnight one night. If you are admitted to hospital after surgery, you will be seen by  members of the medical team, which include your surgeon, the urology residents or house officer and your nurse.    You will be asked not to eat or drink any solid food for 8 hours before surgery and, immediately before the operation, you may be given a pre-medication by the anesthetist which will make you dry-mouthed and pleasantly sleepy.    Please be sure to inform your Urologist in advance of your surgery if you have any of the following:    an artificial heart valve  a coronary artery stent  a heart pacemaker or defibrillator  an artificial joint placed less than 2 years prior to your HoLEP  an artificial blood vessel graft  a neurosurgical shunt  any other implanted foreign body  a prescription for Warfarin, Aspirin or Clopidogrel (Plavix®) or other blood thinners  a previous or current MRSA infection    What happens during the procedure?  A general anesthetic (where you will be asleep throughout the procedure) will be used most often. All methods minimize pain; your anesthetist will explain the pros and cons of each type of anesthetic to you. The operation, on average, takes  minutes, depending on the size of your prostate.    You will usually be given an injectable antibiotic before the procedure after checking for any drug allergies.    The laser is used to separate the obstructing prostate tissue from its surrounding capsule and to push it in large chunks into the bladder. An instrument (morcellator) is then used through the telescope to remove the prostate tissue from the bladder. A catheter is normally left to drain the bladder at the end of the procedure.                        What happens immediately after the procedure?  There is always some bleeding from the prostate area after the operation. The urine is usually clear of blood within 36-72 hours, although some patients lose more blood for longer. It is very unusual to require a blood transfusion after laser surgery.    It is useful to drink as  more fluid than normal in the first week after the operation because this helps the urine clear of any blood more quickly. Sometimes, fluid is flushed through the catheter to clear the urine of blood.    You will be able to eat and drink on the same day as the operation when you feel able to.    The catheter is generally removed after surgery, the date of removal varies and is dependent on several different factors. At first, it may be painful to pass your urine and it may come more frequently or urgently than normal. Any initial discomfort and frequency of urination usually improves steadily within a few days. Some of your symptoms, especially frequency, urgency and getting up at night to pass urine, may not improve for several months because these are often due to bladder over activity (which takes time to resolve after prostate surgery) rather than prostate blockage. Since a large portion of prostate tissue is removing with the laser technique, there may be some temporary loss of urinary control until your pelvic floor muscles strengthen and recover. Pelvic floor exercises before and after surgery help to decrease the chance of any temporary loss of urinary control (incontinence).    It is not unusual for your urine to turn bloody again and blood may be visible in the urine even up to 6 weeks after surgery but this is not usually a problem. Some patients, particularly those with small prostate glands, are unable to pass urine at all after the operation due to temporary swelling of the prostate area.  If this should happen, we would pass a catheter again to allow the swelling to resolve and the bladder to regain its function. Usually, patients who require re-catheterization go home with a catheter in place and then return within a week for a second catheter removal, which is successful in almost all cases.    If you travel from out of town or for medical reasons are admitted to the hospital after surgery, the  expected hospital stay is 1 night. It is safe for almost all patients to be discharged the afternoon after surgery.    Are there any side-effects?  Most procedures have a potential for side-effects. You should be reassured that, although all these complications are well-recognized, the majority of patients do not suffer any problems after a urological procedure.    Please use the check boxes to tick off individual items when you are happy that they have been discussed to your satisfaction:    Common (greater than 1 in 10)  Temporary mild burning, bleeding and frequency of urination after the procedure  No semen is produced during an orgasm in approximately 75% If the prostate is fully enucleated  Treatment may not relieve all the urinary symptoms  Infection of the bladder, testes or kidney requiring antibiotics  Loss of urinary control (incontinence) which usually resolves within 6 weeks (10%); this can usually be improved with pelvic floor exercises  Permanent Incontinence that is bothersome (1%)  Initial failure to pass urine after surgery requiring a new catheter for less than 1 week (10%)    Occasional (between 1 in 10 and 1 in 50)  Weakened erections or impotence (less than 2%)  Injury to the urethra causing delayed scar formation (stricture) in 1%  Finding unsuspected cancer in the removed tissue which may need further treatment (8%)    Rare (less than 1 in 50)  Retained tissue fragments floating in the bladder which may require a second telescopic procedure for their removal  Very rarely, perforation of the bladder requiring a temporary urinary catheter or open surgical repair  Persistent loss of urinary control which may require a further operation (0.5%)  Possible need to repeat treatment later due to re-obstruction (less than 2%)  May need self-catheterization to empty bladder fully If bladder weak (1%)  Bleeding requiring return to theatre and/or blood transfusion (less than 1%)    Hospital-acquired  infection (overall risk for [facility])  Colonization with MRSA (0.02%, 1 in 5,000)  Clostridium difficile bowel infection (0.04%; 1 in 2,500)  MRSA bloodstream infection (0.01%; 1 in 10,000)    (These rates may be greater in high-risk patients e.g. with long- term drainage tubes, after removal of the bladder for cancer, after previous infections, after prolonged hospitalization or after multiple admissions)    What should I expect when I get home?  When you leave hospital, you will be given a after visit summary of your admission. This holds important information about your inpatient stay and your operation. If, in the first few weeks after your discharge, you need to call your PCP for any reason or to attend another hospital, please take this summary with you to allow the doctors to see details of your treatment. This is particularly important if you need to consult another doctor within a few days of your discharge.    Most patients feel pretty much back to normal within a week. Apart from some burning on urination you should not be in any pain.  You may notice that you pass very small flecks of tissue in the urine at times within the first month as the prostate area heals. This does not usually interfere with the urinary stream or cause discomfort. It is normal to pass some blood in the urine (usually intermittently) for up to 6 weeks after surgery.    What else should I look out for?  If you experience increasing frequency, burning or difficulty on passing urine or worrying bleeding, contact your urologist.    In the event of severe bleeding, passage of clots or sudden difficulty in passing urine, you should contact your urologist immediately since it may be necessary for you to be re-admitted to hospital.    Are there any other important points?  Removal of your prostate should not adversely affect your sex life provided you are getting normal erections before the surgery. Sexual activity can be resumed as soon as  you are comfortable, usually after 3-4 weeks.    It is often helpful to continue with pelvic floor exercises as soon as possible after the operation since this can improve your control when you get home. The symptoms of an overactive bladder may take 3 months to resolve whereas the flow is improved immediately.    If you need any specific information on these exercises, please contact the white staff or the Specialist Nurses. The symptoms of an overactive bladder may take 3 months to resolve whereas the flow is improved immediately.    The results of any tissue removed will be available after 14 - 21 days and will be reviewed at your post-operative visit, typically 2-3 weeks after surgery.    Around 3 months after surgery you will be reviewed in the outpatient clinic and several tests repeated (including a flow rate, bladder scan & symptom score) to help assess the effects of the surgery. Please come to your clinic appointment prepared to pass urine for a flow test.    Most patients require a recovery period of 1-2 weeks at home before they feel ready for work. You should avoid any heavy lifting or physical straining during this time. You should not drive until you feel fully recovered; 1 week is the minimum period that most patients require before resuming driving, but you may drive sooner if you feel recovered.    Driving after surgery  It is your responsibility to ensure that you are fit to drive following your surgery. You do not normally need to notify the DMV unless you have a medical condition that will last for longer than 3 months after your surgery and may affect your ability to drive. You should, however, check with your insurance company before returning to driving. Your doctors will be happy to provide you with advice on request.    Who can I contact for more help or information?  Urology Clinic: [phone]    What should I do with this form?  Thank you for taking the trouble to read this information  sheet. Please retain a copy for your own records, please do so below.    If you would like a copy of this form to be filed in your hospital records for future reference, please let your Urologist or Specialist Nurse know.  If you do, however, decide to proceed with the scheduled procedure, you will be asked to sign a separate consent form which will be filed in your hospital notes and you will, in addition, be provided with a copy of the form if you wish.

## 2024-10-10 NOTE — PROGRESS NOTES
[facility] - Urology   Clinic Note    SUBJECTIVE:     Chief Complaint   Patient presents with    Consult     Holep        Referral from: No ref. provider found.    History of Present Illness:  Peter Caldwell is a 78 y.o. male who presents to clinic for BPH with obstruction and LUTS.    Here for evaluation of lower urinary tract symptoms.  Patient has very bothersome lower urinary tract symptoms and has a 159 cc prostate on MRI imaging.  He has a small capacity bladder, detrusor overactivity, and evidence of outlet obstruction on urodynamics.  Referred here today to discuss HoLEP.  He was previously diagnosed prostate cancer and is status post external beam radiation therapy    Previous medications for BPH include: yes: tamsulosin and finasteride  AUA Symptom Score: 25/6  Previous prostatic surgery: No    Patient endorses no additional complaints at this time.    Past Medical History:   Diagnosis Date    AAA (abdominal aortic aneurysm)     Arthritis     BPH (benign prostatic hyperplasia)     Cataract     Colon polyps 2006    Coronary artery disease     Depression     Ex-smoker     Hx of psychiatric care     Lexapro, valium    Mood disorder     chalasani    Old MI (myocardial infarction) 10/07/2022    ELINA (obstructive sleep apnea)     Osteopenia 4/15 aylin 4/20    Other insomnia 01/07/2019    Prostate cancer 03/12/2020    S/P CABG (coronary artery bypass graft) 08/09/2013    S/P PTCA (percutaneous transluminal coronary angioplasty) 08/09/2013    Skin cancer 05/10/2023    Therapy        Past Surgical History:   Procedure Laterality Date    BIOPSY WITH TRANSRECTAL ULTRASOUND (TRUS) GUIDANCE N/A 5/13/2021    Procedure: BIOPSY, WITH TRANSRECTAL US GUIDANCE/ URONAV;  Surgeon: Pawel Salinas MD;  Location: [facility] OR [address] [place] [address];  Service: Urology;  Laterality: N/A;  30 min    CATARACT EXTRACTION W/  INTRAOCULAR LENS IMPLANT Left 07/26/2016    Dr. Norman    CATARACT EXTRACTION W/  INTRAOCULAR LENS IMPLANT Right 08/09/2016     Dr. Norman    CHOLECYSTECTOMY      COLONOSCOPY N/A 2023    Procedure: COLONOSCOPY;  Surgeon: Zachery Ramos MD;  Location: [facility] ENDO;  Service: Endoscopy;  Laterality: N/A;    CORONARY ANGIOPLASTY      CORONARY ARTERY BYPASS GRAFT      HERNIA REPAIR      ptca      rca       Family History   Problem Relation Name Age of Onset    Coronary artery disease Mother      Diabetes Mother      Heart disease Mother      Coronary artery disease Father      Heart disease Father      Heart disease Brother      Heart disease Brother      Schizophrenia Brother      Amblyopia Neg Hx      Blindness Neg Hx      Cataracts Neg Hx      Glaucoma Neg Hx      Macular degeneration Neg Hx      Retinal detachment Neg Hx      Strabismus Neg Hx         Social History     Tobacco Use    Smoking status: Former     Current packs/day: 0.00     Average packs/day: 0.2 packs/day for 6.0 years (0.9 ttl pk-yrs)     Types: Cigarettes     Start date:      Quit date:      Years since quittin.8    Smokeless tobacco: Never   Substance Use Topics    Alcohol use: Not Currently     Alcohol/week: 1.0 standard drink of alcohol     Types: 1 Glasses of wine per week    Drug use: No       Current Outpatient Medications on File Prior to Visit   Medication Sig Dispense Refill    acetaminophen (TYLENOL) 325 MG tablet Take 325 mg by mouth every 6 (six) hours as needed for Pain.      aspirin (ECOTRIN) 81 MG EC tablet Take 81 mg by mouth every evening. MD Sylvia Pisano RN; P Brad DASILVA Staff  Caller: Unspecified (Today,  2:44 PM)  As per notes no need to hold ASA.   If he can stop it that is fine. Likely less hematuria after biopsy but not required      atorvastatin (LIPITOR) 80 MG tablet TAKE 1 TABLET(80 MG) BY MOUTH EVERY NIGHT 90 tablet 3    dutasteride (AVODART) 0.5 mg capsule Take 1 capsule (0.5 mg total) by mouth once daily. 30 capsule 11    ezetimibe (ZETIA) 10 mg tablet TAKE 1 TABLET(10 MG) BY MOUTH EVERY DAY  "90 tablet 3    fluticasone propionate (FLONASE) 50 mcg/actuation nasal spray USE 1 SPRAY IN EACH NOSTRIL EVERY DAY 32 g 3    ibuprofen (ADVIL,MOTRIN) 200 MG tablet Take 200 mg by mouth as needed for Pain. Hold 1 week prior to surgery      metoprolol succinate (TOPROL-XL) 25 MG 24 hr tablet Take 1 tablet (25 mg total) by mouth every evening. 90 tablet 4    multivit with minerals/lutein (MULTIVITAMIN 50 PLUS ORAL) Take by mouth.      PREVIDENT 5000 PLUS 1.1 % Crea 1.1 %.      sertraline (ZOLOFT) 100 MG tablet Take 2 tablets (200 mg total) by mouth once daily. 180 tablet 3    silodosin (RAPAFLO) 8 mg Cap capsule Take 1 capsule (8 mg total) by mouth once daily. 30 capsule 11    vit A/vit C/vit E/zinc/copper (ICAPS AREDS ORAL) Take by mouth.       No current facility-administered medications on file prior to visit.       Review of patient's allergies indicates:   Allergen Reactions    Oxycodone Other (See Comments)    Penicillins      Other reaction(s): Rash    Promethazine Other (See Comments)     paranoia    Percodan [oxycodone hcl-oxycodone-asa]      Sleep / nightmare problems         Review of Systems:  A review of 10+ systems was conducted with pertinent positive and negative findings documented in HPI with all other systems reviewed and negative.    OBJECTIVE:     Estimated body mass index is 25.93 kg/m² as calculated from the following:    Height as of this encounter: 6' 1" (1.854 m).    Weight as of this encounter: 89.1 kg (196 lb 8.6 oz).    Vital Signs (Most Recent)  Vitals:    10/10/24 1113   BP: 129/73   Pulse: 60       Physical Exam:  GENERAL: patient sitting comfortably  HEENT: normocephalic  NECK: supple, no JVD  PULM: normal chest rise, no increased WOB  HEART: non-diaphoretic  ABDO: soft, nondistended, nontender  BACK: no CVA tenderness bilaterally  SKIN: warm, dry, well perfused  EXT: no bruising or edema  NEURO: grossly normal with no focal deficits  PSYCH: appropriate mood and affect    Genitourinary " Exam:  deferred    Lab Results   Component Value Date    BUN 12 09/16/2024    CREATININE 1.0 09/16/2024    WBC 6.20 09/16/2024    HGB 13.8 (L) 09/16/2024    HCT 42.5 09/16/2024     09/16/2024    AST 35 09/16/2024    ALT 33 09/16/2024    ALKPHOS 61 09/16/2024    ALBUMIN 3.6 09/16/2024    INR 1.0 06/02/2009        Imaging:  I have personally reviewed all relevant imaging studies.    No results found for this or any previous visit (from the past 2160 hours).  No results found for this or any previous visit (from the past 2160 hours).  FL Cystogram Voiding (xpd) Non Rad Performed  See OP Notes for results.     IMPRESSION: See OP Notes for results.     This procedure was auto-finalized by: Virtual Radiologist       PSA:  Lab Results   Component Value Date    PSA 4.3 (H) 07/13/2016    PSA 3.9 03/18/2015    PSA 4.35 (H) 03/12/2013    PSA 4.15 (H) 12/12/2012    PSA 2.93 09/10/2012    PSA 4.18 (H) 06/07/2012    PSA 3.1 05/17/2011    PSA 2.6 12/15/2009    PSA 2.0 02/04/2005    PSADIAG 0.06 06/12/2024    PSADIAG 0.28 12/07/2023    PSADIAG 0.63 06/01/2023    PSADIAG 0.87 11/28/2022    PSADIAG 9.8 (H) 12/07/2021    PSADIAG 9.8 (H) 03/10/2021    PSADIAG 6.8 (H) 09/14/2020    PSADIAG 7.8 (H) 03/10/2020    PSADIAG 8.0 (H) 07/30/2019    PSADIAG 9.9 (H) 07/15/2019    PSADIAG 6.5 (H) 01/11/2019    PSADIAG 5.0 (H) 04/18/2017    PSADIAG 5.6 (H) 10/28/2016    PSADIAG 3.2 01/21/2014       Testosterone:  Lab Results   Component Value Date    TOTALTESTOST 565.3 02/04/2005        ASSESSMENT     1. OAB (overactive bladder)    2. Prostate cancer    3. Benign prostatic hyperplasia with lower urinary tract symptoms, symptom details unspecified        PLAN:     BPH with urinary obstruction and LUTS    - The patient's prostate has been measured using MRI at an estimated volume of 159 cc.    - The risks, benefits, and technical details of HoLEP were discussed in detail today. We also discussed alternative treatment modalities for BPH with  urinary obstruction.     - After reviewing the relative merits of alternative treatment modalities we specifically reviewed potential complications related HoLEP itself. These include bleeding and infection, ureteral or bladder injury, urethral stricture, bladder neck contracture, failure to improve voiding dysfunction, persistent urinary retention, stress urinary incontinence, erectile dysfunction, retrograde ejaculation, and need for any further intervention or management.    - patient will consider his options and let me know what he decides.    Ricky Castillo MD  Urology  [facility]    Disclaimer: This note has been generated using voice-recognition software. There may be typographical errors that have been missed during proof-reading.

## 2024-10-14 ENCOUNTER — OFFICE VISIT (OUTPATIENT)
Dept: OPHTHALMOLOGY | Facility: CLINIC | Age: 79
End: 2024-10-14
Payer: MEDICARE

## 2024-10-14 DIAGNOSIS — Z96.1 PSEUDOPHAKIA: ICD-10-CM

## 2024-10-14 DIAGNOSIS — I10 BENIGN ESSENTIAL HYPERTENSION: Chronic | ICD-10-CM

## 2024-10-14 DIAGNOSIS — H52.7 REFRACTIVE ERROR: ICD-10-CM

## 2024-10-14 DIAGNOSIS — H35.363 DRUSEN OF MACULA OF BOTH EYES: ICD-10-CM

## 2024-10-14 DIAGNOSIS — H26.491 PCO (POSTERIOR CAPSULAR OPACIFICATION), RIGHT: Primary | ICD-10-CM

## 2024-10-14 DIAGNOSIS — H43.813 VITREOUS DETACHMENT OF BOTH EYES: ICD-10-CM

## 2024-10-14 PROCEDURE — 99999 PR PBB SHADOW E&M-EST. PATIENT-LVL II: CPT | Mod: PBBFAC,,, | Performed by: OPHTHALMOLOGY

## 2024-10-14 PROCEDURE — 92004 COMPRE OPH EXAM NEW PT 1/>: CPT | Mod: S$PBB,,, | Performed by: OPHTHALMOLOGY

## 2024-10-14 PROCEDURE — 99212 OFFICE O/P EST SF 10 MIN: CPT | Mod: PBBFAC,PO | Performed by: OPHTHALMOLOGY

## 2024-10-14 NOTE — PROGRESS NOTES
"Subjective:       Patient ID: Peter Caldwell is a 78 y.o. male.    Chief Complaint: Concerns About Ocular Health (Patient Peter Caldwell (Bill) is a 78 year old male.)    HPI     Concerns About Ocular Health     Additional comments: Patient Peter Caldwell (Bill) is a 78 year old   male.           Comments    Pt here for annual eye exam. Pt states that VA OU is good but has   occasional crusty, dry eyes (denies any in the past few weeks). Patient   denies diplopia, headaches, flashes/floaters, and pain.    Pt states that he had been taking eye vitamins once daily for the past   year at the recommendation of his PCP at [facility]).    DLS: 06/12/2020 with Dr. Norman (possibly had a more recent eye exam   at [facility])    Meds:  1. AT's PRN OU for dry, crusty eyes  2. AREDS vitamins    POHx:  1. PCO (posterior capsular opacification), right   2. Refractive error   3. Pseudophakia      S/p PCIOL OU: 2016 (Dr. Norman)      S/p YAG laser OS: 06/04/2020                Last edited by Marianna Jenkins on 10/14/2024  1:39 PM.             Assessment:       1. PCO (posterior capsular opacification), right    2. Vitreous detachment of both eyes    3. Benign essential hypertension    4. Refractive error    5. Pseudophakia        Plan:       Early PCO OD- Not Visually Significant.  PVD's OU-Causing floaters. Stable.  HTN-No retinopathy OU.  RE        Control HTN.  RTC 1 yr.      "

## 2024-10-29 DIAGNOSIS — Z00.00 ENCOUNTER FOR MEDICARE ANNUAL WELLNESS EXAM: ICD-10-CM

## 2024-12-12 ENCOUNTER — LAB VISIT (OUTPATIENT)
Dept: LAB | Facility: HOSPITAL | Age: 79
End: 2024-12-12
Attending: RADIOLOGY
Payer: MEDICARE

## 2024-12-12 DIAGNOSIS — C61 PROSTATE CANCER: ICD-10-CM

## 2024-12-12 LAB — COMPLEXED PSA SERPL-MCNC: 0.02 NG/ML (ref 0–4)

## 2024-12-12 PROCEDURE — 84153 ASSAY OF PSA TOTAL: CPT | Performed by: RADIOLOGY

## 2024-12-12 PROCEDURE — 36415 COLL VENOUS BLD VENIPUNCTURE: CPT | Mod: PN | Performed by: RADIOLOGY

## 2024-12-15 DIAGNOSIS — R35.0 BENIGN PROSTATIC HYPERPLASIA WITH URINARY FREQUENCY: ICD-10-CM

## 2024-12-15 DIAGNOSIS — C61 PROSTATE CANCER: ICD-10-CM

## 2024-12-15 DIAGNOSIS — N40.1 BENIGN PROSTATIC HYPERPLASIA WITH URINARY FREQUENCY: ICD-10-CM

## 2024-12-16 RX ORDER — DUTASTERIDE 0.5 MG/1
0.5 CAPSULE, LIQUID FILLED ORAL
Qty: 30 CAPSULE | Refills: 11 | Status: SHIPPED | OUTPATIENT
Start: 2024-12-16

## 2024-12-18 ENCOUNTER — OFFICE VISIT (OUTPATIENT)
Dept: RADIATION ONCOLOGY | Facility: CLINIC | Age: 79
End: 2024-12-18
Payer: MEDICARE

## 2024-12-18 DIAGNOSIS — C61 PROSTATE CANCER: Primary | ICD-10-CM

## 2024-12-18 DIAGNOSIS — N40.1 BENIGN PROSTATIC HYPERPLASIA WITH URINARY FREQUENCY: ICD-10-CM

## 2024-12-18 DIAGNOSIS — R35.0 BENIGN PROSTATIC HYPERPLASIA WITH URINARY FREQUENCY: ICD-10-CM

## 2024-12-18 PROCEDURE — 99213 OFFICE O/P EST LOW 20 MIN: CPT | Mod: 95,,, | Performed by: RADIOLOGY

## 2024-12-18 NOTE — PROGRESS NOTES
The patient location is: [place]  The chief complaint leading to consultation is: prostate cancer     Visit type: Audio-visual     24 minutes of total time spent on the encounter, which includes face to face time and non-face to face time preparing to see the patient (eg, review of tests), Obtaining and/or reviewing separately obtained history, Documenting clinical information in the electronic or other health record, Independently interpreting results (not separately reported) and communicating results to the patient/family/caregiver, or Care coordination (not separately reported).      Each patient to whom he or she provides medical services by telemedicine is:  (1) informed of the relationship between the physician and patient and the respective role of any other health care provider with respect to management of the patient; and (2) notified that he or she may decline to receive medical services by telemedicine and may withdraw from such care at any time.  [facility] Department of Radiation Oncology  Follow Up Visit Note    Diagnosis:  Peter Caldwell is a 79 y.o. male with a(n) low volume, favorable intermediate risk adenocarcinoma of the prostate.  He completed a course of radiation therapy on 10/5/22 to 79.2Gy (due to gland 120cc, decreased from 159 with 6 months pre-medication with ADT).      Oncologic History:  Oncology History   Prostate cancer (Resolved)   3/12/2020 Initial Diagnosis    Prostate cancer     8/20/2021 Cancer Staged    Staging form: Prostate, AJCC 8th Edition  - Clinical stage from 8/20/2021: Stage IIB (cT1c, cN0, cM0, PSA: 9.8, Grade Group: 2)     9/2/2022 - 10/5/2022 Radiation Therapy    Treating physician: Rani Rodriguez  Total Dose: 79.2 Gy to the prostate  Fractions: 44    Treatment Site Ref. ID Energy Dose/Fx (Gy) #Fx Dose Correction (Gy) Total Dose (Gy) Start Date End Date Elapsed Days   IMRT Prost PTV 6X 1.8 44 / 44 0 79.2 8/2/2022 10/5/2022 64              Interval History  The patient presents today for a regularly scheduled follow up visit.  He was last seen in our clinic on 24.   Since that time, interval PSA on 24 was 0.02 (on Avodart-> corrected 0.04).     PSA Diagnostic   Latest Ref Rng 0.00 - 4.00 ng/mL   2020 6.8 (H)    3/10/2021 9.8 (H)    2021 9.8 (H)    2022 0.87    2023 0.63    2023 0.28    2024 0.06    2024 0.02       Legend:  (H) High     Since completing treatment, his urinary symptoms/LUTS had significantly improved, but again worsened. Restarted Flomax at last visit. Considering HoLEP    IN PT for general strengthening/balance issues    IPSS today improved (verbally performed~ 9-10, but feels worse than in the past) (21 at last visit, 15 prior to treatment , 6-7 while on ADT).    Nocturia x 2-3  Frequency ++++  Incomplete emptying: none  Urgency +++  Intermittency: none  Weak stream: none  But remains very unhappy with urinary symptoms  Bowel movements regular.  Occasional small blood with straining. No hot flashes        Review of Systems   Review of Systems   Constitutional:  Positive for malaise/fatigue (stable). Negative for chills and fever.   Gastrointestinal:  Positive for blood in stool (rarely, small with straining). Negative for abdominal pain, constipation and diarrhea.   Genitourinary:  Positive for frequency, hematuria and urgency. Negative for dysuria.   Musculoskeletal: Negative.  Negative for back pain, joint pain and neck pain.   Neurological:  Positive for dizziness (rare lightheadedness, thinks possibly diet related). Negative for headaches.   Psychiatric/Behavioral:  The patient is nervous/anxious.        Social History:  Social History     Tobacco Use    Smoking status: Former     Current packs/day: 0.00     Average packs/day: 0.2 packs/day for 6.0 years (0.9 ttl pk-yrs)     Types: Cigarettes     Start date:      Quit date:      Years since quittin.0    Smokeless  tobacco: Never   Substance Use Topics    Alcohol use: Not Currently     Alcohol/week: 1.0 standard drink of alcohol     Types: 1 Glasses of wine per week    Drug use: No       Family History:  Cancer-related family history is not on file.    Exam:  There were no vitals filed for this visit.      Constitutional: Pleasant 79 y.o. male in no acute distress.  Well nourished. Well groomed.   HEENT: Normocephalic and atraumatic   Lungs: No audible wheezing.  Normal effort.   Musculoskeletal: No gross MSK deformities. Ambulates independently  Skin: No rashes appreciated.   Psych: Alert and oriented with somewhat blunted affect.  Neuro:   Grossly normal.      Assessment:  Recovering well from acute radiation therapy-related toxicities  ECOG: (0) Fully active, able to carry on all predisease performance without restriction    Plan:  Follow up in 6 months with PSA (standing order)  Continue  Flomax   Continue Avodart (rx given)  Follow up with Urology for management of LUTS  He was given our contact information, and he was told that he could call our clinic at anytime if he has any questions or concerns.  Follow up with other providers as directed    24 minutes spent in chart/case review, face-to-face interaction, discussion with other providers, and treatment planning/coordination of care.

## 2024-12-21 ENCOUNTER — PATIENT MESSAGE (OUTPATIENT)
Dept: ADMINISTRATIVE | Facility: OTHER | Age: 79
End: 2024-12-21
Payer: MEDICARE

## 2025-01-04 DIAGNOSIS — R09.81 NASAL CONGESTION: ICD-10-CM

## 2025-01-04 NOTE — TELEPHONE ENCOUNTER
No care due was identified.  [facility] Embedded Care Due Messages. Reference number: [id number].   1/04/2025 1:19:02 PM CST

## 2025-01-05 RX ORDER — FLUTICASONE PROPIONATE 50 MCG
1 SPRAY, SUSPENSION (ML) NASAL
Qty: 32 G | Refills: 2 | Status: SHIPPED | OUTPATIENT
Start: 2025-01-05

## 2025-01-06 NOTE — TELEPHONE ENCOUNTER
Refill Decision Note   Peter Caldwell  is requesting a refill authorization.  Brief Assessment and Rationale for Refill:  Approve     Medication Therapy Plan:         Comments:     Note composed:9:40 PM 01/05/2025

## 2025-01-07 ENCOUNTER — PATIENT MESSAGE (OUTPATIENT)
Dept: ADMINISTRATIVE | Facility: CLINIC | Age: 80
End: 2025-01-07
Payer: MEDICARE

## 2025-01-08 ENCOUNTER — TELEPHONE (OUTPATIENT)
Dept: ADMINISTRATIVE | Facility: CLINIC | Age: 80
End: 2025-01-08
Payer: MEDICARE

## 2025-01-10 ENCOUNTER — OFFICE VISIT (OUTPATIENT)
Dept: FAMILY MEDICINE | Facility: CLINIC | Age: 80
End: 2025-01-10
Payer: MEDICARE

## 2025-01-10 VITALS — WEIGHT: 195 LBS | HEIGHT: 73 IN | BODY MASS INDEX: 25.84 KG/M2

## 2025-01-10 DIAGNOSIS — I71.43 INFRARENAL ABDOMINAL AORTIC ANEURYSM (AAA) WITHOUT RUPTURE: ICD-10-CM

## 2025-01-10 DIAGNOSIS — M85.80 OSTEOPENIA, UNSPECIFIED LOCATION: ICD-10-CM

## 2025-01-10 DIAGNOSIS — F43.22 ADJUSTMENT DISORDER WITH ANXIETY: ICD-10-CM

## 2025-01-10 DIAGNOSIS — N13.8 BENIGN PROSTATIC HYPERPLASIA WITH URINARY OBSTRUCTION: ICD-10-CM

## 2025-01-10 DIAGNOSIS — I10 BENIGN ESSENTIAL HYPERTENSION: Chronic | ICD-10-CM

## 2025-01-10 DIAGNOSIS — H25.13 NUCLEAR SCLEROSIS OF BOTH EYES: ICD-10-CM

## 2025-01-10 DIAGNOSIS — R25.1 TREMOR: ICD-10-CM

## 2025-01-10 DIAGNOSIS — E78.01 FAMILIAL HYPERCHOLESTEROLEMIA: Chronic | ICD-10-CM

## 2025-01-10 DIAGNOSIS — F41.1 GENERALIZED ANXIETY DISORDER: ICD-10-CM

## 2025-01-10 DIAGNOSIS — Z00.00 ENCOUNTER FOR PREVENTIVE HEALTH EXAMINATION: ICD-10-CM

## 2025-01-10 DIAGNOSIS — I70.0 AORTO-ILIAC ATHEROSCLEROSIS: ICD-10-CM

## 2025-01-10 DIAGNOSIS — H91.93 DECREASED HEARING OF BOTH EARS: ICD-10-CM

## 2025-01-10 DIAGNOSIS — R41.3 MEMORY DEFICITS: ICD-10-CM

## 2025-01-10 DIAGNOSIS — Z00.00 ENCOUNTER FOR MEDICARE ANNUAL WELLNESS EXAM: Primary | ICD-10-CM

## 2025-01-10 DIAGNOSIS — I70.8 AORTO-ILIAC ATHEROSCLEROSIS: ICD-10-CM

## 2025-01-10 DIAGNOSIS — I25.119 CORONARY ARTERY DISEASE INVOLVING NATIVE CORONARY ARTERY OF NATIVE HEART WITH ANGINA PECTORIS: ICD-10-CM

## 2025-01-10 DIAGNOSIS — N40.1 BENIGN PROSTATIC HYPERPLASIA WITH URINARY OBSTRUCTION: ICD-10-CM

## 2025-01-10 DIAGNOSIS — R41.3 SHORT-TERM MEMORY LOSS: ICD-10-CM

## 2025-01-10 DIAGNOSIS — F33.1 MODERATE EPISODE OF RECURRENT MAJOR DEPRESSIVE DISORDER: ICD-10-CM

## 2025-01-10 DIAGNOSIS — C61 PROSTATE CANCER: ICD-10-CM

## 2025-01-10 NOTE — PROGRESS NOTES
"      The patient location is: [place]  The chief complaint leading to consultation is: Medicare AWV    Visit type: audiovisual    Face to Face time with patient: 31  60 minutes of total time spent on the encounter, which includes face to face time and non-face to face time preparing to see the patient (eg, review of tests), Obtaining and/or reviewing separately obtained history, Documenting clinical information in the electronic or other health record, Independently interpreting results (not separately reported) and communicating results to the patient/family/caregiver, or Care coordination (not separately reported).         Each patient to whom he or she provides medical services by telemedicine is:  (1) informed of the relationship between the physician and patient and the respective role of any other health care provider with respect to management of the patient; and (2) notified that he or she may decline to receive medical services by telemedicine and may withdraw from such care at any time.    Notes:       Peter Caldwell presented for a  Medicare AWV and comprehensive Health Risk Assessment today. The following components were reviewed and updated:    Medical history  Family History  Social history  Allergies and Current Medications  Health Risk Assessment  Health Maintenance  Care Team         ** See Completed Assessments for Annual Wellness Visit within the encounter summary.**         The following assessments were completed:  Living Situation  CAGE  Depression Screening  Fall Risk Assessment (MACH 10)  Hearing Assessment(HHI)  Cognitive Function Screening  Nutrition Screening  ADL Screening  PAQ Screening    Opioid documentation:      Patient does not have a current opioid prescription.        Vitals:    01/10/25 0805   Weight: 88.5 kg (195 lb)   Height: 6' 1" (1.854 m)     Body mass index is 25.73 kg/m².  Physical Exam  Constitutional:       General: He is not in acute distress.     Appearance: Normal " appearance. He is well-developed and well-groomed.   Skin:     Coloration: Skin is not pale.   Neurological:      Mental Status: He is alert and oriented to person, place, and time.   Psychiatric:         Mood and Affect: Mood normal.         Speech: Speech normal.         Behavior: Behavior normal. Behavior is cooperative.         Thought Content: Thought content normal.               Diagnoses and health risks identified today and associated recommendations/orders:    1. Encounter for Medicare annual wellness exam  - Ambulatory Referral/Consult to Enhanced Annual Wellness Visit (eAWV)    2. Encounter for preventive health examination  Screenings performed, as noted above. Personal preventative testing needs reviewed.     3. Prostate cancer  Stable. S/p radiation. Followed by Rad Onc, Urology.     4. Coronary artery disease involving native coronary artery of native heart with angina pectoris  5. Aorto-iliac atherosclerosis  Chronic. Stable with statin therapy, ezetimibe, metoprolol. Followed by Cardiology.     6. Infrarenal abdominal aortic aneurysm (AAA) without rupture  Chronic. Stable. Followed by Cardiology.     7. Moderate episode of recurrent major depressive disorder  8. Generalized anxiety disorder  9. Adjustment disorder with anxiety  Chronic; stable on sertraline.  [facility].     10. Memory deficits  11. Short-term memory loss  MiniCog score 2/5. Patient was referred to Neuro in 2023 during last Medicare AWV, did not schedule appointment. Feels things are getting worse and would like a new referral. Advised someone will call him to get this appt scheduled.   - Ambulatory referral/consult to Neurology; Future    12. Tremor  Reports tremor that is becoming worse. Was referred to neuro in 2023; however, did not schedule appt. Would like referral placed.   - Ambulatory referral/consult to Neurology; Future    13. Decreased hearing of both ears  Has hearing aids, does not wear them.  Followed by ENT.     14. Familial hypercholesterolemia  Chronic. Stable with statin therapy, ezetimibe, metoprolol. Followed by Cardiology.     15. Benign essential hypertension  Chronic. Stable with metoprolol. Followed by Cardiology.     16. Benign prostatic hyperplasia with urinary obstruction  Chronic. Stable with dutasteride, sildosin. Followed by Urology.     17. Osteopenia, unspecified location  Chronic. Stable. Followed by PCP.     18. Nuclear sclerosis of both eyes  Chronic. Stable. Currently takes OTC eye vitamins. Followed by Ophthalmology.       Provided Peter with a 5-10 year written screening schedule and personal prevention plan. Recommendations were developed using the USPSTF age appropriate recommendations. Education, counseling, and referrals were provided as needed. After Visit Summary printed and given to patient which includes a list of additional screenings\tests needed.    Follow up in about 1 year (around 1/10/2026) for your next annual wellness visit.    Tonya Hernandez NP      Advance Care Planning     I offered to discuss advanced care planning, including how to pick a person who would make decisions for you if you were unable to make them for yourself, called a health care power of , and what kind of decisions you might make such as use of life sustaining treatments such as ventilators and tube feeding when faced with a life limiting illness recorded on a living will that they will need to know. (How you want to be cared for as you near the end of your natural life)     X  Patient has advanced directive written but has opted not to place them on file with the institution.

## 2025-01-10 NOTE — PATIENT INSTRUCTIONS
Counseling and Referral of Other Preventative  (Italic type indicates deductible and co-insurance are waived)    Patient Name: Peter Caldwell  Today's Date: 1/10/2025    Health Maintenance       Date Due Completion Date    COVID-19 Vaccine (17 - 2024-25 season) 11/15/2024 9/20/2024    Lipid Panel 09/16/2025 9/16/2024    Aspirin/Antiplatelet Therapy 10/10/2025 10/10/2024    PROSTATE-SPECIFIC ANTIGEN 12/12/2025 12/12/2024    Override on 1/7/2019: Done    Override on 3/12/2013: Done (DR Barroso Urologist)    Colonoscopy 01/19/2028 1/19/2023    TETANUS VACCINE 11/29/2033 11/29/2023        Orders Placed This Encounter   Procedures    Ambulatory referral/consult to Neurology       The following information is provided to all patients.  This information is to help you find resources for any of the problems found today that may be affecting your health:                  Living healthy guide: www.[facility]      Understanding Diabetes: www.diabetes.org      Eating healthy: www.cdc.gov/healthyweight      CDC home safety checklist: www.cdc.gov/steadi/patient.html      Agency on Aging: www.goea.[place].[facility]      Alcoholics anonymous (AA): www.aa.org      Physical Activity: www.chelle.nih.gov/zh5pwcc      Tobacco use: www.quitwithusla.org

## 2025-01-20 DIAGNOSIS — F33.1 MODERATE EPISODE OF RECURRENT MAJOR DEPRESSIVE DISORDER: ICD-10-CM

## 2025-01-20 NOTE — TELEPHONE ENCOUNTER
No care due was identified.  [facility] Embedded Care Due Messages. Reference number: [id number].   1/20/2025 2:59:43 PM CST

## 2025-01-21 RX ORDER — SERTRALINE HYDROCHLORIDE 100 MG/1
200 TABLET, FILM COATED ORAL
Qty: 180 TABLET | Refills: 2 | Status: SHIPPED | OUTPATIENT
Start: 2025-01-21

## 2025-01-21 NOTE — TELEPHONE ENCOUNTER
Refill Decision Note   Peter Caldwell  is requesting a refill authorization.  Brief Assessment and Rationale for Refill:  Approve     Medication Therapy Plan:        Comments:     Note composed:4:02 PM 01/21/2025

## 2025-02-01 DIAGNOSIS — I10 BENIGN ESSENTIAL HYPERTENSION: Chronic | ICD-10-CM

## 2025-02-03 RX ORDER — METOPROLOL SUCCINATE 25 MG/1
25 TABLET, EXTENDED RELEASE ORAL NIGHTLY
Qty: 90 TABLET | Refills: 4 | Status: SHIPPED | OUTPATIENT
Start: 2025-02-03

## 2025-02-05 ENCOUNTER — TELEPHONE (OUTPATIENT)
Dept: NEUROLOGY | Facility: CLINIC | Age: 80
End: 2025-02-05
Payer: MEDICARE

## 2025-02-20 ENCOUNTER — TELEPHONE (OUTPATIENT)
Dept: NEUROLOGY | Facility: CLINIC | Age: 80
End: 2025-02-20
Payer: MEDICARE

## 2025-02-20 ENCOUNTER — OFFICE VISIT (OUTPATIENT)
Dept: NEUROLOGY | Facility: CLINIC | Age: 80
End: 2025-02-20
Payer: MEDICARE

## 2025-02-20 ENCOUNTER — LAB VISIT (OUTPATIENT)
Dept: LAB | Facility: HOSPITAL | Age: 80
End: 2025-02-20
Payer: MEDICARE

## 2025-02-20 ENCOUNTER — TELEPHONE (OUTPATIENT)
Dept: NEUROLOGY | Facility: CLINIC | Age: 80
End: 2025-02-20

## 2025-02-20 VITALS
WEIGHT: 198.94 LBS | SYSTOLIC BLOOD PRESSURE: 104 MMHG | HEIGHT: 73 IN | DIASTOLIC BLOOD PRESSURE: 59 MMHG | HEART RATE: 64 BPM | BODY MASS INDEX: 26.37 KG/M2 | RESPIRATION RATE: 14 BRPM

## 2025-02-20 DIAGNOSIS — F43.22 ADJUSTMENT DISORDER WITH ANXIETY: ICD-10-CM

## 2025-02-20 DIAGNOSIS — D64.9 ANEMIA, UNSPECIFIED TYPE: ICD-10-CM

## 2025-02-20 DIAGNOSIS — R25.1 TREMOR, UNSPECIFIED: ICD-10-CM

## 2025-02-20 DIAGNOSIS — G20.C PARKINSONISM, UNSPECIFIED PARKINSONISM TYPE: ICD-10-CM

## 2025-02-20 DIAGNOSIS — R41.3 MEMORY LOSS: Primary | ICD-10-CM

## 2025-02-20 DIAGNOSIS — R41.3 MEMORY LOSS: ICD-10-CM

## 2025-02-20 DIAGNOSIS — R41.89 COGNITIVE AND BEHAVIORAL CHANGES: ICD-10-CM

## 2025-02-20 DIAGNOSIS — R41.3 OTHER AMNESIA: ICD-10-CM

## 2025-02-20 DIAGNOSIS — R46.89 COGNITIVE AND BEHAVIORAL CHANGES: ICD-10-CM

## 2025-02-20 LAB
ALBUMIN SERPL BCP-MCNC: 3.8 G/DL (ref 3.5–5.2)
ALP SERPL-CCNC: 61 U/L (ref 40–150)
ALT SERPL W/O P-5'-P-CCNC: 32 U/L (ref 10–44)
ANION GAP SERPL CALC-SCNC: 8 MMOL/L (ref 8–16)
AST SERPL-CCNC: 40 U/L (ref 10–40)
BASOPHILS # BLD AUTO: 0.02 K/UL (ref 0–0.2)
BASOPHILS NFR BLD: 0.3 % (ref 0–1.9)
BILIRUB SERPL-MCNC: 0.6 MG/DL (ref 0.1–1)
BUN SERPL-MCNC: 9 MG/DL (ref 8–23)
CALCIUM SERPL-MCNC: 9.5 MG/DL (ref 8.7–10.5)
CHLORIDE SERPL-SCNC: 110 MMOL/L (ref 95–110)
CO2 SERPL-SCNC: 25 MMOL/L (ref 23–29)
CREAT SERPL-MCNC: 1.1 MG/DL (ref 0.5–1.4)
DIFFERENTIAL METHOD BLD: ABNORMAL
EOSINOPHIL # BLD AUTO: 0.1 K/UL (ref 0–0.5)
EOSINOPHIL NFR BLD: 1.9 % (ref 0–8)
ERYTHROCYTE [DISTWIDTH] IN BLOOD BY AUTOMATED COUNT: 14.3 % (ref 11.5–14.5)
EST. GFR  (NO RACE VARIABLE): >60 ML/MIN/1.73 M^2
GLUCOSE SERPL-MCNC: 81 MG/DL (ref 70–110)
HCT VFR BLD AUTO: 44.4 % (ref 40–54)
HGB BLD-MCNC: 14 G/DL (ref 14–18)
HIV 1+2 AB+HIV1 P24 AG SERPL QL IA: NORMAL
IMM GRANULOCYTES # BLD AUTO: 0.02 K/UL (ref 0–0.04)
IMM GRANULOCYTES NFR BLD AUTO: 0.3 % (ref 0–0.5)
LYMPHOCYTES # BLD AUTO: 1 K/UL (ref 1–4.8)
LYMPHOCYTES NFR BLD: 13.7 % (ref 18–48)
MCH RBC QN AUTO: 30.4 PG (ref 27–31)
MCHC RBC AUTO-ENTMCNC: 31.5 G/DL (ref 32–36)
MCV RBC AUTO: 96 FL (ref 82–98)
MONOCYTES # BLD AUTO: 0.7 K/UL (ref 0.3–1)
MONOCYTES NFR BLD: 9.6 % (ref 4–15)
NEUTROPHILS # BLD AUTO: 5.2 K/UL (ref 1.8–7.7)
NEUTROPHILS NFR BLD: 74.2 % (ref 38–73)
NRBC BLD-RTO: 0 /100 WBC
PLATELET # BLD AUTO: 176 K/UL (ref 150–450)
PMV BLD AUTO: 10.3 FL (ref 9.2–12.9)
POTASSIUM SERPL-SCNC: 4.6 MMOL/L (ref 3.5–5.1)
PROT SERPL-MCNC: 6.6 G/DL (ref 6–8.4)
RBC # BLD AUTO: 4.61 M/UL (ref 4.6–6.2)
SODIUM SERPL-SCNC: 143 MMOL/L (ref 136–145)
T4 FREE SERPL-MCNC: 0.55 NG/DL (ref 0.71–1.51)
TSH SERPL DL<=0.005 MIU/L-ACNC: 5.2 UIU/ML (ref 0.4–4)
WBC # BLD AUTO: 6.95 K/UL (ref 3.9–12.7)

## 2025-02-20 PROCEDURE — 99215 OFFICE O/P EST HI 40 MIN: CPT | Mod: PBBFAC,PO | Performed by: NURSE PRACTITIONER

## 2025-02-20 PROCEDURE — 86593 SYPHILIS TEST NON-TREP QUANT: CPT | Performed by: NURSE PRACTITIONER

## 2025-02-20 PROCEDURE — 83520 IMMUNOASSAY QUANT NOS NONAB: CPT | Performed by: NURSE PRACTITIONER

## 2025-02-20 PROCEDURE — 80053 COMPREHEN METABOLIC PANEL: CPT | Performed by: NURSE PRACTITIONER

## 2025-02-20 PROCEDURE — 83921 ORGANIC ACID SINGLE QUANT: CPT | Performed by: NURSE PRACTITIONER

## 2025-02-20 PROCEDURE — 85025 COMPLETE CBC W/AUTO DIFF WBC: CPT | Performed by: NURSE PRACTITIONER

## 2025-02-20 PROCEDURE — 84443 ASSAY THYROID STIM HORMONE: CPT | Performed by: NURSE PRACTITIONER

## 2025-02-20 PROCEDURE — 86592 SYPHILIS TEST NON-TREP QUAL: CPT | Performed by: NURSE PRACTITIONER

## 2025-02-20 PROCEDURE — 87389 HIV-1 AG W/HIV-1&-2 AB AG IA: CPT | Performed by: NURSE PRACTITIONER

## 2025-02-20 PROCEDURE — 84439 ASSAY OF FREE THYROXINE: CPT | Performed by: NURSE PRACTITIONER

## 2025-02-20 NOTE — PROGRESS NOTES
NEUROLOGY  Outpatient Consultation Visit     [facility]  [address] [facility], [place] [address]  [phone] (office) / [phone] (fax)    Patient Name:  Peter Caldwell  :  1945  MR #:  [id number]  Acct #:  [id number]    Date of  Visit: 2025    Other Physicians:  Ricky Delaney MD (Primary Care Physician)      CHIEF COMPLAINT: Memory Loss (New patient / no family Hx of dementia)        HISTORY OF PRESENT ILLNESS:  Peter Caldwell is a 79 y.o. R-handed male seen in consultation for memory loss per Tonya Hernandez NP    Medical history is significant for depression, anxiety, hearing loss, CAD s/p CABG, HTN, AAA without rupture, aortoiliac atherosclerosis, BPH, melanoma of skin, prostate cancer    Here today with wife, Dee   Retired from [place] [employer], PhD level education. Retired [employer].   They between [place], primary is [place]     He notes ST memory loss, like forgetting where he places items or what he is supposed to do on a given day. He has trouble recalling names / words, very frustrating. Hard to follow plots on shows. Easily looses train of thought, notes executive dysfunction. Very meticulous, keeps detailed records of all appts, writes to do list daily. Manages his own meds, etc. He drives without difficulty. Has never had good sense of direction. He does note VS issues, like when parking his car.     Onset was approximately 2 years ago, but he feels that it is worse within the past year.     Feels off balance. Not falling. Doing PT now for this. No dizziness or neuropathy. Harder getting in or out of a car, rising from a chair. No sig gait change, like shuffling. When getting dressed, hard to stand on one leg.     Also notes tremor. Feels that fine motor skills are affected. In both hands, more prominent on the L. Has had episodes when he wakes and his L hand is shaking vigorously. Handwriting is shaky or messy, he has to focus hard when writing or  "buttoning. Worse with anxiety.     Recently notes pills getting stuck, no other dysphagia. No voice change. Hearing loss is chronic. Has hearing aids, doesn't like them. Knows that he needs to try again.     Mood has been labile, like a "roller coaster." Difficultly navigating aging process. Used to travel often and now its harder as he ages. [facility] Last visit in 11/2024. Increased Zoloft, but made lethargy worse. [facility] been [facility] and on sertraline for many years for anxiety and depression. He collects and uses fidget toys "obsessively." Wife notes that he has always been "obsessive compulsive." It helps him stay calm. Has always loved to eat sweets, no change in preferences. He has not had any hallucinations.     Poor quality of sleep with bizarre dreams. Self diagnosed as PTSD from his past career. Dreams about past work in admin. Chronic, but becoming more intense. No dream reenactment. EMR has ELINA, but he has never had sleep testing. He snores some. Sleeps MN - 11 am typically. 3-4 times up to urinate.   Fatigued, low energy, naps.     Treated for prostate cancer and now struggling with incontinence since.     No known FH of ND disease. Younger brother was schizophrenic.     Non smoker. ETOH during his time in the service, but no regular ETOH use since then.     Daily MV, compliant with Rx. ASA for CAD, no hx of CVA/TIA     Allergies:  Review of patient's allergies indicates:   Allergen Reactions    Oxycodone Other (See Comments)    Penicillins      Other reaction(s): Rash    Promethazine Other (See Comments)     paranoia    Percodan [oxycodone hcl-oxycodone-asa]      Sleep / nightmare problems         Current Medications:  Current Medications[1]    Past Medical History:  Past Medical History:   Diagnosis Date    AAA (abdominal aortic aneurysm)     Arthritis     BPH (benign prostatic hyperplasia)     Cataract     Colon polyps 2006    Coronary artery disease     Depression  "    Ex-smoker     Hx of psychiatric care     Lexapro, valium    Mood disorder     ziggy    Old MI (myocardial infarction) 10/07/2022    ELINA (obstructive sleep apnea)     Osteopenia 4/15 aylin 4/20    Other insomnia 01/07/2019    Prostate cancer 03/12/2020    S/P CABG (coronary artery bypass graft) 08/09/2013    S/P PTCA (percutaneous transluminal coronary angioplasty) 08/09/2013    Skin cancer 05/10/2023    Therapy        Past Surgical History:  Past Surgical History:   Procedure Laterality Date    BIOPSY WITH TRANSRECTAL ULTRASOUND (TRUS) GUIDANCE N/A 05/13/2021    Procedure: BIOPSY, WITH TRANSRECTAL US GUIDANCE/ URONAV;  Surgeon: Pawel Salinas MD;  Location: [facility] OR [address] [place] [address];  Service: Urology;  Laterality: N/A;  30 min    CATARACT EXTRACTION W/  INTRAOCULAR LENS IMPLANT Left 07/26/2016    Dr. Norman    CATARACT EXTRACTION W/  INTRAOCULAR LENS IMPLANT Right 08/09/2016    Dr. Norman    CHOLECYSTECTOMY      COLONOSCOPY N/A 01/19/2023    Procedure: COLONOSCOPY;  Surgeon: Zachery Ramos MD;  Location: [facility];  Service: Endoscopy;  Laterality: N/A;    CORONARY ANGIOPLASTY      CORONARY ARTERY BYPASS GRAFT      HERNIA REPAIR      PROSTATE SURGERY  March 2019    Prostate biopsy    ptca      rca    VASECTOMY  1986?       Family History:  family history includes Coronary artery disease in his father and mother; Depression in his mother; Diabetes in his mother; Early death in his brother; Heart disease in his brother, brother, brother, father, and mother; Mental illness in his brother; Schizophrenia in his brother.    Social History:   reports that he quit smoking about 54 years ago. His smoking use included cigarettes. He started smoking about 65 years ago. He has a 1.4 pack-year smoking history. He has never used smokeless tobacco. He reports that he does not currently use alcohol after a past usage of about 1.0 standard drink of alcohol per week. He reports that he does not use drugs.      REVIEW OF  "SYSTEMS:  As per HPI    PHYSICAL EXAM:  BP (!) 104/59 (BP Location: Right arm, Patient Position: Sitting)   Pulse 64   Resp 14   Ht 6' 1" (1.854 m)   Wt 90.2 kg (198 lb 15.4 oz)   BMI 26.25 kg/m²     General: Well groomed. No acute distress.  Pulmonary: Normal effort and rate.   Musculoskeletal: No obvious joint deformities, moves all extremities well.  Extremities: No clubbing, cyanosis or edema.   Psych: Pleasant, cooperative. Anxious during exam.     Neurological Exam  Mental Status  Awake and alert. Oriented to person, place, time and situation. Immediate recall: 2/5 on immediate recall. At 5 minutes 1/5 on delayed recall . Able to copy figure. Clock drawing is abnormal. Speech is normal. Able to name objects. Follows three-step commands. Able to perform serial calculations. Fund of knowledge is appropriate for level of education.  MOCA 22/30.    Cranial Nerves  CN II: Right visual acuity: Finger movement. Left visual acuity: Finger movement. Visual fields full to confrontation.  CN III, IV, VI: Extraocular movements intact bilaterally. Normal lids and orbits bilaterally. Pupils equal round and reactive to light bilaterally.  CN V:  Right: Facial sensation is normal.  Left: Facial sensation is normal on the left.  CN VII:  Right: There is no facial weakness.  Left: There is no facial weakness.  CN VIII:  Right: Hearing is normal.  Left: Hearing is normal.  CN IX, X: Palate elevates symmetrically  CN XI: Shoulder shrug strength is normal.  CN XII: Tongue midline without atrophy or fasciculations.    Motor  Normal muscle bulk throughout. No fasciculations present. Increased muscle tone. Paratonic throughout, hard to relax for exam. No abnormal involuntary movements. Strength is 5/5 throughout all four extremities.  No drift .    Sensory  Light touch is normal in upper and lower extremities. Vibration is normal in upper and lower extremities.     Reflexes                                            Right        "               Left  Brachioradialis                    2+                         2+  Biceps                                 2+                         2+  Patellar                                2+                         2+    Right pathological reflexes: Ankle clonus absent.  Left pathological reflexes: Ankle clonus absent.    Coordination  Right: Finger-to-nose normal. Rapid alternating movement abnormality:Left: Finger-to-nose normal. Rapid alternating movement abnormality:      .    Gait   Tandem gait abnormality: Romberg is absent. Normal pull test.    .    * Specialized movement exam Gen: no masked faces, normal blink  Speech: mild hypophonic  Tremor:   Postural on L  BUE with action - mod   Intermittent L thumb at rest   Bradykinesia:  Mild nathalia bilat on FT, R O/S  Mild nathalia on both toe taps  Tone: paratonic throughout, difficulty to assess for rigidity  Gait:  Able to rise without arms but slow  Normal posture  3 step turn, no festination or freezing   Normal stride, semi wide base  Ok arm swing bilat, mild L hand tremor intermittent   Pull Test: normal         DIAGNOSTIC DATA:  I have personally reviewed provider notes, labs and imaging made available to me today.     Imaging:  [facility] 1/2025:   Negative      Cardiac:  Results for orders placed or performed in visit on 10/07/22   IN OFFICE EKG 12-LEAD (to [place])    Collection Time: 10/07/22 11:04 AM    Narrative    Test Reason : I10,I25.10,Z95.1,    Vent. Rate : 057 BPM     Atrial Rate : 057 BPM     P-R Int : 190 ms          QRS Dur : 078 ms      QT Int : 446 ms       P-R-T Axes : 004 -14 057 degrees     QTc Int : 434 ms    Sinus bradycardia  Otherwise normal ECG  When compared with ECG of 10-MAY-2021 10:23,  No significant change was found  Confirmed by Asif Sexton JR., MD ([id number]) on 10/7/2022 11:53:31 AM    Referred By: [email or web address]   SELF           Confirmed By:Asif Sexton       Labs:  Lab Results   Component Value Date    WBC 6.20 09/16/2024  "   HGB 13.8 (L) 09/16/2024    HCT 42.5 09/16/2024     09/16/2024    MCV 94 09/16/2024    RDW 14.3 09/16/2024     Lab Results   Component Value Date     09/16/2024    K 4.6 09/16/2024     09/16/2024    CO2 23 09/16/2024    BUN 12 09/16/2024    CREATININE 1.0 09/16/2024     09/16/2024    CALCIUM 9.1 09/16/2024     Lab Results   Component Value Date    PROT 6.3 09/16/2024    ALBUMIN 3.6 09/16/2024    BILITOT 0.7 09/16/2024    AST 35 09/16/2024    ALKPHOS 61 09/16/2024    ALT 33 09/16/2024     Lab Results   Component Value Date    INR 1.0 06/02/2009     Lab Results   Component Value Date    CHOL 122 09/16/2024    HDL 31 (L) 09/16/2024    LDLCALC 66.8 09/16/2024    TRIG 121 09/16/2024    CHOLHDL 25.4 09/16/2024     No results found for: "LABA1C", "HGBA1C"   No results found for: "TQYEFWDG83"  No results found for: "FOLATE"  Lab Results   Component Value Date    TSH 2.1 05/13/2005     Outside labs 3/2024  A1C 5.7  Vit D 42  LDL 67  RPR NR  B12 463  Folate > 20       ASSESSMENT & PLAN:  Peter Caldwell is a 79 y.o. R-handed male seen in consultation for memory loss.     Problem List Items Addressed This Visit          Neuro    Cognitive and behavioral changes    Overview   MOCA  22/30 Feb 2025         Current Assessment & Plan   Discussed normal aging / MCI / dementia with pt and family  Does not meet criteria for MCI/dementia based on history today, but concern for emergent ND disorder  Etiology unclear, may be multifactorial. He has some Pdism on exam, raising concern for synucleinopathy but not fitting with primary PD. He reports recent behavioral changes, but wife reports these are baseline for him so unclear what role that is playing.   Reviewed role of Rx in this setting -- will defer until further testing complete   Discussed vascular RF and importance of continued efforts to maximize vascular health -- on ASA, statin  Medication list reviewed and discussed -- no AC meds   Sleep likely " problematic.     Plan:  MRI brain   Serologies to assess for any metabolic factors that may worsen cognition  NP testing  Syn One test  He will consider in lab PSG to r/o sleep disorder            Tremor, unspecified       Psychiatric    Adjustment disorder with anxiety     Other Visit Diagnoses         Memory loss    -  Primary      Parkinsonism, unspecified Parkinsonism type          Anemia, unspecified type          Other amnesia                Follow up:   After Syn One, 30 min       I spent a total of 75 minutes on the day of the visit.    This includes face to face time with the patient, as well as non-face to face time preparing for and completing the visit (review of prior diagnostic testing and clinical notes, obtaining or reviewing history, documenting clinical information in the EMR, independently interpreting and communicating results to the patient/family and coordinating ongoing care).       I appreciate the opportunity to participate in the care of this patient. Please feel free to contact me with any concerns or questions.       Miladys Atkins, [facility]  [facility]  [address] [place] [address]         [1]   Current Outpatient Medications   Medication Sig Dispense Refill    aspirin (ECOTRIN) 81 MG EC tablet Take 81 mg by mouth every evening. MD Sylvia Pisano, RN; P Brad DASILVA Staff  Caller: Unspecified (Today,  2:44 PM)  As per notes no need to hold ASA.   If he can stop it that is fine. Likely less hematuria after biopsy but not required      atorvastatin (LIPITOR) 80 MG tablet TAKE 1 TABLET(80 MG) BY MOUTH EVERY NIGHT 90 tablet 3    dutasteride (AVODART) 0.5 mg capsule TAKE 1 CAPSULE(0.5 MG) BY MOUTH EVERY DAY 30 capsule 11    ezetimibe (ZETIA) 10 mg tablet TAKE 1 TABLET(10 MG) BY MOUTH EVERY DAY 90 tablet 3    fluticasone propionate (FLONASE) 50 mcg/actuation nasal spray INHALE 1 SPRAY IN EACH NOSTRIL EVERY DAY 32 g 2    ibuprofen  (ADVIL,MOTRIN) 200 MG tablet Take 200 mg by mouth as needed for Pain. Hold 1 week prior to surgery      metoprolol succinate (TOPROL-XL) 25 MG 24 hr tablet TAKE 1 TABLET(25 MG) BY MOUTH EVERY EVENING 90 tablet 4    multivit with minerals/lutein (MULTIVITAMIN 50 PLUS ORAL) Take 1 tablet by mouth once daily.      sertraline (ZOLOFT) 100 MG tablet TAKE 2 TABLETS(200 MG) BY MOUTH EVERY  tablet 2    silodosin (RAPAFLO) 8 mg Cap capsule Take 1 capsule (8 mg total) by mouth once daily. 30 capsule 11    vit A/vit C/vit E/zinc/copper (ICAPS AREDS ORAL) Take 1 capsule by mouth once daily.       No current facility-administered medications for this visit.

## 2025-02-20 NOTE — PATIENT INSTRUCTIONS
MRI brain  Labs today  [facility], [facility]ate, B1 levels  Comprehensive cognitive testing - neuropsych testing -- will try to expedite this  Consider doing a skin biopsy to evaluate for proteins associated with a specific group of neurological disorders -- Syn One test   Consider sleep study in lab     Please call our clinic at [phone] or send a message on the [facility] portal if there are any changes to the plan discussed today. For example, if you are not contacted for the requested tests, referral(s) within one week, if you are unable to receive the medications prescribed, or if you feel you need to change the treatment course for any reason.     While memory loss may not be reversible in many cases, we do know that there are several steps that you can take to prevent further memory loss:  Control of chronic vascular risk factors:  For longevity and cognitive health, you should work closely with your PCP and other specialists to aggressively control vascular risk factors, including hypertension, hyperlipidemia, obesity and diabetes.   Cigarette smoking and other tobacco use greatly increase long term vascular risk (risk for heart attacks, strokes, or other vascular diseases).       Diet:  We recommend the MIND diet, a combination of two healthy diets: the Mediterranean diet and the DASH (Dietary Approaches to Stop Hypertension) diet. It includes a variety of brain-friendly foods to optimize cognitive health and longevity. This diet typically includes fresh fruits/vegetables, whole grains, fish and poultry.   Research indicates certain nutrients may aid in brain function, such as B vitamins (especially B6, B12, and folic acid), antioxidants (such as vitamins C and E, and beta carotene), and Omega-3 fatty acids.  Minimize or eliminate the use of alcohol     Medications:  Discuss any prescription or over the counter medications you might be taking with your doctor to avoid those that can cause sedation  or worsen cognitive function, such as sleep aids, benzodiazepines, and antihistamines.     Socialization and Exercise:  30-45 minutes of brisk physical activity 5 days/week has been shown to improve function in vascular dementias, lower the risk of stroke and slow the progression of memory loss  Activities that are engaging or mentally stimulating, such as word puzzles, jigsaw puzzles and Sudoku, are also beneficial to cognitive health  Regular interaction with friends, family and community are also known to be helpful.     Sleep:  Establish a regular, consistent sleep pattern and practice good sleep hygiene.    Avoid screen time (computer, TV, smartphones or tablets) or heavy meals for at least an hour before bedtime.   Avoid caffeine or stimulants after 2 PM.   Exercise earlier in the day or mornings and keep your sleeping environment comfortable. Bedtime and wake-up times should be consistent every night and morning so the body becomes used to a single routine, even on the weekends.   Having a wind down routine (e.g., soft lights in the house, bath before bed, reduced fluid intake, songs, reading, less noise) also helps to promote sleep readiness.   Untreated obstructive sleep apnea can lead to an increased risk for stroke and further memory loss      STRATEGIES TO COPE WITH YOUR COGNITIVE DEFICITS:  Pay Attention!  Reduce distractions in the area  Look at the person speaking to you & paraphrase what they are saying  Write down important things  Ask them to repeat themselves if you zone out  Ask people to simplify or reduce information if you need to  Processing Speed  Using multiple methods to learn new information, such as listening, taking notes, and recording, can be helpful  Give yourself enough time to complete certain tasks to reduce frustration  Executive Functioning  Don't try to multi-task. Do tasks separately to ensure that each gets completed.   Use a calendar or planner to keep you on track  Write  down steps to complicated tasks in case you forget  Storing Information  Immediately after you learn something, try to recall it. Repeat this and gradually lengthen the interval between your recalls  Recalling information   Jog your memory! If you loose something, try to think back to when you last had it. Mentally walk yourself through the steps of your day to prod your memory.   Use clues, timers, memos, notes, etc.  Stay organized - keep your keys in a certain place, put your important papers in a certain place, etc.   Having a routine helps to anchor memories as well

## 2025-02-20 NOTE — Clinical Note
Would like to expedite NP testing on this pt if possible :)  Highly educated, very anxious -- hard to interpret his in clinic testing. I have some concern for synucleinopathy

## 2025-02-20 NOTE — ASSESSMENT & PLAN NOTE
Discussed normal aging / MCI / dementia with pt and family  Does not meet criteria for MCI/dementia based on history today, but concern for emergent ND disorder  Etiology unclear, may be multifactorial. He has some Pdism on exam, raising concern for synucleinopathy but not fitting with primary PD. He reports recent behavioral changes, but wife reports these are baseline for him so unclear what role that is playing.   Reviewed role of Rx in this setting -- will defer until further testing complete   Discussed vascular RF and importance of continued efforts to maximize vascular health -- on ASA, statin  Medication list reviewed and discussed -- no AC meds   Sleep likely problematic.     Plan:  MRI brain   Serologies to assess for any metabolic factors that may worsen cognition  NP testing  Syn One test  He will consider in lab PSG to r/o sleep disorder

## 2025-02-21 LAB — TREPONEMA PALLIDUM IGG+IGM AB [PRESENCE] IN SERUM OR PLASMA BY IMMUNOASSAY: NONREACTIVE

## 2025-02-25 LAB
LC PHOSPHO-TAU (181P): 1.08 PG/ML (ref 0–0.97)
NEUROFILAMENT LIGHT CHAIN, PLASMA: 19.1 PG/ML

## 2025-02-26 ENCOUNTER — RESULTS FOLLOW-UP (OUTPATIENT)
Dept: NEUROLOGY | Facility: CLINIC | Age: 80
End: 2025-02-26

## 2025-02-26 LAB — METHYLMALONATE SERPL-SCNC: 0.22 UMOL/L

## 2025-03-06 ENCOUNTER — HOSPITAL ENCOUNTER (OUTPATIENT)
Dept: RADIOLOGY | Facility: HOSPITAL | Age: 80
Discharge: HOME | End: 2025-03-06
Attending: NURSE PRACTITIONER
Payer: MEDICARE

## 2025-03-06 ENCOUNTER — OFFICE VISIT (OUTPATIENT)
Dept: FAMILY MEDICINE | Facility: CLINIC | Age: 80
End: 2025-03-06
Payer: MEDICARE

## 2025-03-06 VITALS
DIASTOLIC BLOOD PRESSURE: 60 MMHG | WEIGHT: 193.31 LBS | BODY MASS INDEX: 25.62 KG/M2 | OXYGEN SATURATION: 95 % | HEIGHT: 73 IN | SYSTOLIC BLOOD PRESSURE: 104 MMHG | HEART RATE: 66 BPM

## 2025-03-06 DIAGNOSIS — C61 PROSTATE CANCER: ICD-10-CM

## 2025-03-06 DIAGNOSIS — R41.3 OTHER AMNESIA: ICD-10-CM

## 2025-03-06 DIAGNOSIS — I25.119 CORONARY ARTERY DISEASE INVOLVING NATIVE CORONARY ARTERY OF NATIVE HEART WITH ANGINA PECTORIS: ICD-10-CM

## 2025-03-06 DIAGNOSIS — E03.9 ACQUIRED HYPOTHYROIDISM: Primary | ICD-10-CM

## 2025-03-06 DIAGNOSIS — R41.3 MEMORY DEFICITS: ICD-10-CM

## 2025-03-06 PROCEDURE — 99999 PR PBB SHADOW E&M-EST. PATIENT-LVL IV: CPT | Mod: PBBFAC,,, | Performed by: FAMILY MEDICINE

## 2025-03-06 PROCEDURE — 99214 OFFICE O/P EST MOD 30 MIN: CPT | Mod: PBBFAC,25,PO | Performed by: FAMILY MEDICINE

## 2025-03-06 PROCEDURE — 70551 MRI BRAIN STEM W/O DYE: CPT | Mod: TC,PO

## 2025-03-06 PROCEDURE — 99214 OFFICE O/P EST MOD 30 MIN: CPT | Mod: S$PBB,,, | Performed by: FAMILY MEDICINE

## 2025-03-06 PROCEDURE — G2211 COMPLEX E/M VISIT ADD ON: HCPCS | Mod: S$PBB,,, | Performed by: FAMILY MEDICINE

## 2025-03-06 PROCEDURE — 70551 MRI BRAIN STEM W/O DYE: CPT | Mod: 26,,, | Performed by: RADIOLOGY

## 2025-03-06 RX ORDER — LEVOTHYROXINE SODIUM 50 UG/1
50 TABLET ORAL
Qty: 90 TABLET | Refills: 1 | Status: SHIPPED | OUTPATIENT
Start: 2025-03-06 | End: 2026-03-06

## 2025-03-06 RX ORDER — TAMSULOSIN HYDROCHLORIDE 0.4 MG/1
CAPSULE ORAL
COMMUNITY
Start: 2025-03-03 | End: 2025-03-06

## 2025-03-06 NOTE — PROGRESS NOTES
Patient ID: Peter Caldwell is a 79 y.o. male.    Chief Complaint: Annual Exam    History of Present Illness    CHIEF COMPLAINT:  Patient presents today for follow-up    PSYCHIATRIC:  He has a history of PTSD and anxiety, [facility] He has been evaluated by psychiatry and is stable on Zoloft. He also reports cognitive issues which have been evaluated through annual wellness exams and neurological assessments.    CARDIOVASCULAR:  He has a history of coronary artery disease status post bypass surgery and angioplasty. He denies active symptoms of coronary artery disease or congestive heart failure.    PROSTATE CANCER:  He was diagnosed with prostate cancer in 2023, managed with radiation oncology. He is currently taking Rapaflo and Avodart 0.5 mg for prostate management.      ROS:  General: -fever, -chills, -fatigue, -weight gain, -weight loss  Eyes: -vision changes, -redness, -discharge  ENT: -ear pain, -nasal congestion, -sore throat  Cardiovascular: -chest pain, -palpitations, -lower extremity edema  Respiratory: -cough, -shortness of breath  Gastrointestinal: -abdominal pain, -nausea, -vomiting, -diarrhea, -constipation, -blood in stool  Genitourinary: -dysuria, -hematuria, -frequency  Musculoskeletal: -joint pain, -muscle pain  Skin: -rash, -lesion  Neurological: -headache, -dizziness, -numbness, -tingling  Psychiatric: -anxiety, -depression, -sleep difficulty, +memory problems         Physical Exam    General: No acute distress. Well-developed. Well-nourished.  Eyes: EOMI. Sclerae anicteric.  HENT: Normocephalic. Atraumatic. Nares patent. Moist oral mucosa.  Ears: Bilateral TMs clear. Bilateral EACs clear.  Cardiovascular: Regular rate. Regular rhythm. No murmurs. No rubs. No gallops. Normal S1, S2.  Respiratory: Normal respiratory effort. Clear to auscultation bilaterally. No rales. No rhonchi. No wheezing.  Abdomen: Soft. Non-tender. Non-distended. Normoactive bowel  sounds.  Musculoskeletal: No  obvious deformity.  Extremities: No lower extremity edema.  Neurological: Alert & oriented x3. No slurred speech. Normal gait.  Psychiatric: Normal mood. Normal affect. Good insight. Good judgment.  Skin: Warm. Dry. No rash.         Assessment & Plan    IMPRESSION:  - Assessed cognitive issues, PTSD, and anxiety; noted stability on Zoloft  - Evaluated CAD status post bypass surgery and angioplasty; patient remains asymptomatic on maximum dose atorvastatin and Zetia  - Reviewed prostate cancer management; currently on Rapaflo and Avodart for prostate health  - Considered recent lab results as up-to-date    PROSTATE HEALTH:  - Continued Rapaflo and Avodart 0.5 mg for prostate health management.    PROSTATE CANCER:  - Monitored the patient's prostate cancer, diagnosed in 2023.  - Coordinated with radiation oncology for ongoing prostate cancer management.    PTSD:  - Continued Zoloft for PTSD management.  - Monitored the patient's PTSD symptoms.  - [facility]    ANXIETY:  - Continued Zoloft for anxiety management.  - Monitored the patient's anxiety symptoms.  - Noted that the patient has been stable on Zoloft.  - Coordinated with psychiatry for ongoing anxiety treatment.    COGNITIVE ISSUES:  - Continued Zoloft for cognitive issues.  - Monitored the patient's cognitive symptoms.  - Noted that the patient has undergone neurologic evaluation.    CORONARY ARTERY DISEASE (CAD):  - Continued atorvastatin at maximum dose for CAD management.  - Continued Zetia for CAD management.  - Monitored the patient's coronary artery disease, noting no active symptoms of CAD or CHF.  - Coordinated with cardiology for ongoing CAD management.  - Monitored the patient's history of bypass surgery.  - Monitored the patient's history of angioplasty.    FOLLOW UP:  - Follow up in 6 months for reassessment of lab results.              Follow up in about 6 months (around  9/6/2025).    This note was generated with the assistance of ambient listening technology. Verbal consent was obtained by the patient and accompanying visitor(s) for the recording of patient appointment to facilitate this note. I attest to having reviewed and edited the generated note for accuracy, though some syntax or spelling errors may persist. Please contact the author of this note for any clarification.

## 2025-03-19 ENCOUNTER — TELEPHONE (OUTPATIENT)
Dept: NEUROLOGY | Facility: CLINIC | Age: 80
End: 2025-03-19
Payer: MEDICARE

## 2025-03-19 DIAGNOSIS — R46.89 COGNITIVE AND BEHAVIORAL CHANGES: Primary | ICD-10-CM

## 2025-03-19 DIAGNOSIS — G20.C PARKINSONISM, UNSPECIFIED PARKINSONISM TYPE: ICD-10-CM

## 2025-03-19 DIAGNOSIS — R41.89 COGNITIVE AND BEHAVIORAL CHANGES: Primary | ICD-10-CM

## 2025-03-24 ENCOUNTER — PROCEDURE VISIT (OUTPATIENT)
Dept: NEUROLOGY | Facility: CLINIC | Age: 80
End: 2025-03-24
Payer: MEDICARE

## 2025-03-24 VITALS
BODY MASS INDEX: 26.07 KG/M2 | HEIGHT: 73 IN | RESPIRATION RATE: 14 BRPM | DIASTOLIC BLOOD PRESSURE: 63 MMHG | HEART RATE: 63 BPM | WEIGHT: 196.75 LBS | SYSTOLIC BLOOD PRESSURE: 105 MMHG

## 2025-03-24 DIAGNOSIS — G20.C PARKINSONISM, UNSPECIFIED PARKINSONISM TYPE: ICD-10-CM

## 2025-03-24 DIAGNOSIS — R46.89 COGNITIVE AND BEHAVIORAL CHANGES: Primary | ICD-10-CM

## 2025-03-24 DIAGNOSIS — R41.89 COGNITIVE AND BEHAVIORAL CHANGES: Primary | ICD-10-CM

## 2025-03-24 PROCEDURE — 99499 UNLISTED E&M SERVICE: CPT | Mod: S$PBB,,, | Performed by: NURSE PRACTITIONER

## 2025-03-24 PROCEDURE — 11105 PUNCH BX SKIN EA SEP/ADDL: CPT | Mod: S$PBB,,, | Performed by: NURSE PRACTITIONER

## 2025-03-24 PROCEDURE — 11105 PUNCH BX SKIN EA SEP/ADDL: CPT | Mod: PBBFAC,PO | Performed by: NURSE PRACTITIONER

## 2025-03-24 PROCEDURE — 11104 PUNCH BX SKIN SINGLE LESION: CPT | Mod: S$PBB,,, | Performed by: NURSE PRACTITIONER

## 2025-03-24 PROCEDURE — 11104 PUNCH BX SKIN SINGLE LESION: CPT | Mod: PBBFAC,PO | Performed by: NURSE PRACTITIONER

## 2025-03-24 NOTE — PROCEDURES
Syn-One Skin Biopsy    Risks of the procedure, including local bleeding and risk of infection, were explained to the patient and they expressed verbal and provided written consent for the procedure.     A 3 mm punch biopsy was used to collect the following samples from the patient's LEFT side:  Posterior cervical 3 cm lateral to C7  Distal thigh 10 cm above lateral knee  Distal leg 10 cm above lateral malleolus     The sites were marked with a skin marker and then sterilized with alcohol prep pads. 1 cc of 1% lidocaine HCl (10mg/cc) was injected into each marked site with a 30 gauge needle. The skin was cleansed again with providone-iodine prep and the biopsies were collected. The samples were then placed into the designated marked vials of Zamboni solution. After applying gentle pressure to the sites, they were covered with a large band aid.     Instructed to call the office if any signs of infection present. Written discharge instructions were provided to the patient.       Miladys Atkins, [facility]  [facility]

## 2025-04-02 ENCOUNTER — OFFICE VISIT (OUTPATIENT)
Dept: NEUROLOGY | Facility: CLINIC | Age: 80
End: 2025-04-02
Payer: MEDICARE

## 2025-04-02 DIAGNOSIS — R41.89 COGNITIVE AND BEHAVIORAL CHANGES: Primary | ICD-10-CM

## 2025-04-02 DIAGNOSIS — R46.89 COGNITIVE AND BEHAVIORAL CHANGES: Primary | ICD-10-CM

## 2025-04-02 DIAGNOSIS — F33.1 MODERATE EPISODE OF RECURRENT MAJOR DEPRESSIVE DISORDER: ICD-10-CM

## 2025-04-02 DIAGNOSIS — G20.C PARKINSONISM, UNSPECIFIED PARKINSONISM TYPE: ICD-10-CM

## 2025-04-02 NOTE — PROGRESS NOTES
NEUROPSYCHOLOGY CONSULT (TELEHEALTH)    Referral Information  Name: Peter Caldwell  MRN: [id number]  : 1945  Age: 79 y.o.  Race: White  Gender: male  Referring Provider: Miladys Atkins Np  [address] [facility] [address]  [address] [place] [address]  Billing: See below for details as coding/billing has changed   Telemedicine:   The patient location is: [place], [place]  The provider location is: [place], [place]  The chief complaint leading to consultation/medical necessity is: Cognitive concerns  Visit type: Virtual visit with synchronous audio and video  Total time spent with patient: 50 minutes  Each patient to whom he or she provides medical services by telemedicine is:  (1) informed of the relationship between the physician and patient and the respective role of any other health care provider with respect to management of the patient; and (2) notified that he or she may decline to receive medical services by telemedicine and may withdraw from such care at any time.  Consent/Emergency Plan: The patient expressed an understanding of the purpose of the evaluation and consented to all procedures. I informed the patient of limits to confidentiality and discussed an emergency plan. His wife was present on the call.    SUMMARY/TREATMENT PLAN   Results from the interview indicate the following diagnoses and treatment plan recommendations. The patient may be able to follow a treatment plan without help from family.    Diagnoses/Plan:  1. Cognitive and behavioral changes  Overview:  MOCA        2. Parkinsonism, unspecified Parkinsonism type    3. 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 He also reported fluctuating mood in the context of longstanding treatment for anxiety and increase in use of fidget toys. He is independent in  "basic and instrumental activities of daily living. Workup has suggested possible temporal atrophy and elevated pTau 181.    Dr. Caldwell will complete a neuropsychological evaluation on 04/08/2025.    Thank you for allowing me to participate in Dr. Caldwell's care.  If you have any questions, please contact me at [phone].     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 He initially visited Neurology on 02/20/2025, reporting memory changes. Performance on a brief mental status measure was reduced ([place] Cognitive Assessment [MOCA] = 22/30). Physical exam was notable for difficulty with rapid alternating movement on the left and difficulty with tandem gait; on a specialized movement exam, speech was mildly hypophonic, with postural tremor on the left and bilateral upper extremity action tremor, and intermittent left thumb tremor at rest. He had mild bradykinesia bilaterally. Tone was paratonic making it difficulty to assess for rigidity. He was able to rise slowly without using his arms; posture was normal. He was able to do a three step turn without festination or freezing; stride was normal with a semi wide-based. Arm swing was okay, with mild left hand intermittent tremor.    Cognitive Symptoms:  Type/Examples:   Attention: He reported mild changes in attention. He has to remind himself of what he needs to do.  Mental Speed: He has a lot of different perspectives in mind and takes time to figure out what he should do.  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 He has vivid memories of 50 years ago but has trouble recalling more recent information. He said cues and reminders are helpful. He forgets names and social events that he "should have known."   Language: He reported that he has "lost command of some words that I would like " "to use." He tries to find an alternative word; he uses an "ABC" technique, which is helpful. He may also think of the word later. He denied comprehension difficulty if he can hear the information.  Visuospatial/Perceptual: None reported  Executive Functioning: He reported he is more careful about setting his agenda. His wife said he completes tasks well and has always been very organized.  Onset:  Per recent records, memory changes started in approximately 2023, but older records noted first report of memory concerns in 2019. During the interview, they described the change as gradual.  Course: Progressive     Current Functional Status/Needs:  ADLs  Self-Care Eating Safety Other   Independent Independent No concerns      Instrumental IADLs:   Driving Medications/Health Household Finances   Independent Independent Independent Independent, with split responsibilities     Psychiatric/Behavioral Symptoms:  Mood:  Depression/Dysphoria Anxiety/Fearfulness Irritability   Records noted a history of depressed mood related to aging; [facility]. He reported concern regarding friends' aging/health symptoms. Records noted anxiety and report of a history of trauma. He reported mild anxiety. He reported feeling that he is in a good place relative to 50 years ago. Records noted labile mood. His wife noted improvement recently with medication.     Behavior:  Agitation/Resistance Delusions/Paranoia Hallucinations   None reported None reported None reported     Apathy/Motivation Repetitive/Restlessness Other   None reported outside of reduced activity due to aging. He collects in uses fidget toys, which is a recent change. His wife reported he has always been somewhat obsessive.      Neurovegetative:  Sleep/Nighttime  [employer]   Records noted poor quality sleep with bizarre dreams.  He has some snoring but has not been assessed for sleep apnea. He said he goes to sleep at 1am and sleeps " until 12pm. Over the last couple of weeks, he has had bizarre dreams, and he wakes up holding his arms crossed. They denied other movement. He reported weight loss over the last 6-9 months with dietary changes. His wife said he eats sweets, which is not a change. Former marathon runner; stopped after heart issues. He was engaging in yard work but backed away because it was physically demanding. He is engaged in physical therapy.     Suicidal/Homicidal Ideation: None reported    Physical Symptoms: Records noted he feels off balance; he also has a tremor. He has changes in handwriting and difficulty with fine motor movements; he is right-handed. He has hearing loss but does not like to wear hearing aids; he has tinnitus. He wears glasses; no recent changes. His blood pressure has been stable. He denied dizziness.     PERTINENT BACKGROUND INFORMATION   SOCIAL HISTORY    Family Status: ; two children  Current Living Situation: lives with spouse in [place] and in [place] (apartment at one of their children's homes)  Primary Source of Support: family  Daily Activities: active with family in [place]; Mosque; limited hobbies; television ([employer])  Stressors: aging  Other Factors:  Educational Level: No learning or attention difficulty in school; he described some difficulty with personal discipline; graduated high school; graduated college; earned a [employer] (Educational Administration)  Occupational Status and History: Retired from the [place] [employer] (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); supervisor, 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 , 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, 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)  Enlisted in the [employer] ([id number]; was not deployed to [place]; was stationed in [place]; was stationed at [employer] in [place], 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); retired with an honorable discharge; nominated for an [employer] commendation  medal but turned it down  Other: No developmental delays or early childhood illnesses; he was diagnosed with vision difficulty in early adolescence and started wearing glasses; English is his primary language    Family History   Problem Relation Name Age of Onset    Coronary artery disease Mother [place]     Diabetes Mother [place]     Heart disease Mother [place]     Depression Mother [place]     Coronary artery disease Father Javon     Heart disease Father Javon     Heart disease Brother Javon     Early death Brother Javon     Heart disease Brother      Schizophrenia Brother      Heart disease Brother [place]     Mental illness Brother Candelario     Amblyopia Neg Hx      Blindness Neg Hx      Cataracts Neg Hx      Glaucoma Neg Hx      Macular degeneration Neg Hx      Retinal detachment Neg Hx      Strabismus Neg Hx       Family Neurologic History: Negative for heritable risk factors  Family Psychiatric History: Brother with mental health concerns    MEDICAL STATUS  Patient Active Problem List   Diagnosis    Benign essential hypertension    Coronary artery disease involving native coronary artery of native heart with angina pectoris    Familial hypercholesterolemia    BPH (benign prostatic hyperplasia)    S/P CABG (coronary artery bypass graft)    S/P PTCA (percutaneous transluminal coronary angioplasty)    Osteopenia    AAA (abdominal aortic aneurysm)    Nuclear sclerosis of both eyes    Vitreous detachment of both eyes    Refractive error    Senile nuclear sclerosis    Nuclear sclerosis of right eye    Cognitive and behavioral changes    Decreased hearing of both ears    Right hip pain    Moderate episode of recurrent major depressive disorder    PCO (posterior capsular opacification), bilateral    Pseudophakia    PCO (posterior capsular opacification), right    Adjustment disorder with anxiety    Elevated lipoprotein(a)    Generalized anxiety disorder    Parkinsonism, unspecified    Aorto-iliac atherosclerosis     Past  "Medical History:   Diagnosis Date    AAA (abdominal aortic aneurysm)     Arthritis     BPH (benign prostatic hyperplasia)     Cataract     Colon polyps 2006    Coronary artery disease     Depression     Ex-smoker     Hx of psychiatric care     Lexapro, valium    Mood disorder     chalasani    Old MI (myocardial infarction) 10/07/2022    ELINA (obstructive sleep apnea)     Osteopenia 4/15 aylin 4/20    Other insomnia 01/07/2019    Parkinsonism, unspecified 1/10/2025    Prostate cancer 03/12/2020    S/P CABG (coronary artery bypass graft) 08/09/2013    S/P PTCA (percutaneous transluminal coronary angioplasty) 08/09/2013    Skin cancer 05/10/2023    Therapy      Past Surgical History:   Procedure Laterality Date    BIOPSY WITH TRANSRECTAL ULTRASOUND (TRUS) GUIDANCE N/A 05/13/2021    Procedure: BIOPSY, WITH TRANSRECTAL US GUIDANCE/ URONAV;  Surgeon: Pawel Salinas MD;  Location: [facility] OR [address] [place] [address];  Service: Urology;  Laterality: N/A;  30 min    CATARACT EXTRACTION W/  INTRAOCULAR LENS IMPLANT Left 07/26/2016    Dr. Norman    CATARACT EXTRACTION W/  INTRAOCULAR LENS IMPLANT Right 08/09/2016    Dr. Norman    CHOLECYSTECTOMY      COLONOSCOPY N/A 01/19/2023    Procedure: COLONOSCOPY;  Surgeon: Zachery Ramos MD;  Location: [facility];  Service: Endoscopy;  Laterality: N/A;    CORONARY ANGIOPLASTY      CORONARY ARTERY BYPASS GRAFT      HERNIA REPAIR      PROSTATE SURGERY  March 2019    Prostate biopsy    ptca      rca    VASECTOMY  1986?     Updated/Relevant Neurologic History:  Falls: None reported recently; he tripped and fell a year ago and has since been more careful.  TBI: None reported  Seizures: None reported  Stroke: None reported  Movement Concerns: Parkinsonism on exam  Prior Neuropsychological Testing: None reported  Referral Diagnosis:   R41.3 (ICD-10-CM) - Memory loss   G20.C (ICD-10-CM) - Parkinsonism, unspecified Parkinsonism type     Recent Labs  No results found for: "YNZHIRPL51"  No results found " "for: "RPR"  No results found for: "FOLATE"  Lab Results   Component Value Date    TSH 5.202 (H) 02/20/2025     No results found for: "LABA1C", "HGBA1C"  Lab Results   Component Value Date    GHO01TAFW Non-reactive 02/20/2025     pTau-181 04/08/2025 1.08 (elevated)  Neurofilament Light Chain 02/20/2025 19.1 WNL    Imaging    Results for orders placed or performed during the hospital encounter of 03/06/25   MRI Brain Without Contrast    Narrative    EXAM:  MRI BRAIN WITHOUT CONTRAST    CLINICAL HISTORY:  Memory loss;Parkinsonian syndrome; Other amnesia.    COMPARISON:  Head CT dated 10/15/2024    FINDINGS:  Intracranial contents:There is no acute abnormality.  There is no intracranial hemorrhage.  There is no mass.  There are no regions of restricted diffusion to suggest acute infarction.  There is generalized volume loss with prominence of the extra-axial spaces.  Volume loss is global but may be more prominent in the temporal lobes bilaterally.  There is no hydrocephalus or midline shift.  There is only very minimal periventricular white matter FLAIR hyperintense signal.  These changes likely reflect sequelae of minimal chronic small vessel disease.  The basilar cisterns are open.  Flow voids indicating patency are present in the major vessels at the base of the brain.  The cerebellar tonsils are normal position.  Sellar structures are normal.    The patient has had bilateral lens replacement surgery.  The orbits are otherwise grossly normal.    Extracranial contents, calvarium, soft tissues:Baseline marrow signal is mildly heterogeneous but nonspecific.  There is trace mucosal thickening in the ethmoid air cells.  Otherwise, the paranasal sinuses and mastoid air cells are clear.      Impression    1. There is no acute abnormality.  There is global volume loss with only minimal nonspecific white matter change.  There is no hemorrhage, mass or acute infarction.  There is no change compared to the prior " CT.      Electronically signed by: Porfirio Lees MD  Date:    2025  Time:    16:56     Current Outpatient Medications:     aspirin (ECOTRIN) 81 MG EC tablet, Take 81 mg by mouth every evening. MD Sylvia Pisano, RN; DIANA DASILVA Staff Caller: Unspecified (Today,  2:44 PM) As per notes no need to hold ASA.  If he can stop it that is fine. Likely less hematuria after biopsy but not required, Disp: , Rfl:     atorvastatin (LIPITOR) 80 MG tablet, TAKE 1 TABLET(80 MG) BY MOUTH EVERY NIGHT, Disp: 90 tablet, Rfl: 3    dutasteride (AVODART) 0.5 mg capsule, TAKE 1 CAPSULE(0.5 MG) BY MOUTH EVERY DAY, Disp: 30 capsule, Rfl: 11    ezetimibe (ZETIA) 10 mg tablet, TAKE 1 TABLET(10 MG) BY MOUTH EVERY DAY, Disp: 90 tablet, Rfl: 3    fluticasone propionate (FLONASE) 50 mcg/actuation nasal spray, INHALE 1 SPRAY IN EACH NOSTRIL EVERY DAY, Disp: 32 g, Rfl: 2    ibuprofen (ADVIL,MOTRIN) 200 MG tablet, Take 200 mg by mouth as needed for Pain. Hold 1 week prior to surgery, Disp: , Rfl:     levothyroxine (SYNTHROID) 50 MCG tablet, Take 1 tablet (50 mcg total) by mouth before breakfast., Disp: 90 tablet, Rfl: 1    metoprolol succinate (TOPROL-XL) 25 MG 24 hr tablet, TAKE 1 TABLET(25 MG) BY MOUTH EVERY EVENING, Disp: 90 tablet, Rfl: 4    multivit with minerals/lutein (MULTIVITAMIN 50 PLUS ORAL), Take 1 tablet by mouth once daily., Disp: , Rfl:     sertraline (ZOLOFT) 100 MG tablet, TAKE 2 TABLETS(200 MG) BY MOUTH EVERY DAY, Disp: 180 tablet, Rfl: 2    silodosin (RAPAFLO) 8 mg Cap capsule, Take 1 capsule (8 mg total) by mouth once daily., Disp: 30 capsule, Rfl: 11    vit A/vit C/vit E/zinc/copper (ICAPS AREDS ORAL), Take 1 capsule by mouth once daily., Disp: , Rfl:     Updated/Relevant Psychiatric History: His father and mother  suddenly when he was a young adult, within a few years of each other.    Substance Use:  History of alcohol and nicotine use but no current use.    MENTAL STATUS AND  "OBSERVATIONS:  APPEARANCE: Casually dressed and adequate grooming/hygiene. He wore glasses. He occasionally closed his eyes when thinking.   ALERTNESS/ORIENTATION: Attentive and alert. Fully oriented (x5) to time and place  GAIT: Not assessed  MOTOR MOVEMENTS/MANNERISMS: Not assessed  SPEECH/LANGUAGE: Normal in rate, rhythm, tone, and volume. Occasional word finding difficulty noted. Expressive and receptive language was normal.  STATED MOOD/AFFECT: The patients stated mood was "better, since I met you." Affect was congruent with stated mood.   INTERPERSONAL BEHAVIOR: Rapport was quickly and easily established   SUICIDALITY/HOMICIDALITY: Denied  HALLUCINATIONS/DELUSIONS: None evidenced or endorsed  THOUGHT PROCESSES/INSIGHT: Thoughts seemed logical and goal-directed. His responses were detailed and deliberate.    BILLING  Service Description CPT Code Minutes Units   Psychiatric diagnostic evaluation by physician [id number]  0   Neurobehavioral status exam by physician [id number]   Each additional hour by physician [id number]  0       "

## 2025-04-03 PROBLEM — G20.C PARKINSONISM, UNSPECIFIED: Status: ACTIVE | Noted: 2025-01-10

## 2025-04-08 ENCOUNTER — OFFICE VISIT (OUTPATIENT)
Dept: NEUROLOGY | Facility: CLINIC | Age: 80
End: 2025-04-08
Payer: MEDICARE

## 2025-04-08 DIAGNOSIS — G20.C PARKINSONISM, UNSPECIFIED PARKINSONISM TYPE: ICD-10-CM

## 2025-04-08 DIAGNOSIS — R41.3 MEMORY LOSS: ICD-10-CM

## 2025-04-08 DIAGNOSIS — F33.0 MILD EPISODE OF RECURRENT MAJOR DEPRESSIVE DISORDER: ICD-10-CM

## 2025-04-08 DIAGNOSIS — G31.84 MILD NEUROCOGNITIVE DISORDER: ICD-10-CM

## 2025-04-08 DIAGNOSIS — F41.1 GENERALIZED ANXIETY DISORDER: ICD-10-CM

## 2025-04-08 PROCEDURE — 99211 OFF/OP EST MAY X REQ PHY/QHP: CPT | Mod: PBBFAC,PO | Performed by: PSYCHIATRY & NEUROLOGY

## 2025-04-08 PROCEDURE — 99999 PR PBB SHADOW E&M-EST. PATIENT-LVL I: CPT | Mod: PBBFAC,,, | Performed by: PSYCHIATRY & NEUROLOGY

## 2025-04-17 PROBLEM — F33.0 MILD EPISODE OF RECURRENT MAJOR DEPRESSIVE DISORDER: Status: ACTIVE | Noted: 2019-01-07

## 2025-04-17 PROBLEM — G31.84 MILD NEUROCOGNITIVE DISORDER: Status: ACTIVE | Noted: 2019-01-07

## 2025-04-17 NOTE — PROGRESS NOTES
NEUROPSYCHOLOGY CONSULT    Referral Information  Name: Peter Caldwell  MRN: [id number]  : 1945  Age: 79 y.o.  Race: White  Gender: male  Dominant Hand: Right  Referring Provider: Miladys Atkins Np  [address]  [address] [place] [address]  Billing: See below for details as coding/billing has changed   Referral Reason/Medical Necessity: Neuropsychological evaluation to assess current cognitive functioning, aid in differential diagnosis, and provide treatment recommendations in the context of reported cognitive changes.  Consent/Emergency Plan: The patient expressed an understanding of the purpose of the evaluation and consented to all procedures. I informed the patient of limits to confidentiality and discussed an emergency plan.    SUMMARY/TREATMENT PLAN   Results from the interview indicate the following diagnoses and treatment plan recommendations. The patient has follow a treatment plan without help from family, utilizing compensatory strategies.    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 He also reported fluctuating mood in the context of longstanding treatment for anxiety and increase in use of fidget toys. He is independent in basic and instrumental activities of daily living. Workup has suggested possible temporal atrophy and elevated pTau 181.    DrAron Caldwell's testing performance was evaluated in the context of variable performance validity. His premorbid intellectual abilities were estimated to be in the  high average range. Basic auditory attention and working memory were largely intact, with the exception of reduced digit sequencing. Visuomotor and oral processing speed performances were below expectation, possibly due, in part, to a precise and measured approach to drawing tasks. He demonstrated difficulty on tasks of set-shifting and problem-solving, with reduced letter and category fluency. Naming was intact, with intact reading. Visual perception and block construction were intact. Drawing was intact when copying a complex figure, with some errors when copying simple figures. Learning, recall, and recognition of a word list were reduced. Learning and recall of stories was relatively reduced, with intact recognition. Visual learning, recall, and recognition were reduced. Fine motor speed and dexterity were reduced bilaterally. He endorsed mild depression and moderate anxiety.    In sum, despite variable performance validity, there is concern for cognitive change. Patterns of performance were mixed, with difficulty with processing speed, executive functioning, motor speed, and learning and memory, with variable improvement with recognition formats. Report of cognitive changes with independence in activities of daily living suggests a diagnosis of mild neurocognitive disorder. Overall information is suggestive of a neurodegenerative process, with concern for an Alzheimer's process that would not fully explain the parkinsonian findings. Additional workup is recommended. Symptoms of anxiety and depression and poor sleep are also likely contributing.    Recommendations:   Neuropsychology Follow-up: Follow-up in 1-2 weeks to review diagnoses and recommendations. Follow-up assessment is recommended in 18-24 months to assess cognitive changes over time.    Medical Follow-Up: Continue usual follow up for current active medical issues, including continued monitoring of hearing loss and [facility] Consider additional diagnostic work up (e.g., DaTscan, skin biopsy).  Other Relevant Orders/Issues:   Sleep Medicine: Consultation with Sleep Medicine is recommended to assess current sleep quality, rule out a sleep-related disorder, and provide recommendations for treatment.    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 This evaluation can be completed at the [facility] with Kelby Prince. If interested, I can place a referral and the family can contact Charlotte Carrero at [phone] for scheduling.     Recommendations for Dr. Caldwell:  Attention: Remember that inattention and lack of focus are major culprits to forgetting information so be sure and practice paying attention for adequate learning of information. If you rely on passive attention to remembering something (e.g., yeah, uh-huh approach), youll find you cannot recall it later. I recommend the following to improve attention, which may aid in later recall:   Reduce distractions in the area as much as possible.  Look at the person as they are speaking to you.   Paraphrase as they are speaking  Write down important pieces of information   Ask them to repeat if you zone out.   Have them simplify and reduce information that you need to attend to during conversation.   Have visual cues to remind you if you need to do something later.  Processing Speed:   Using multiple modalities (e.g., listening, writing notes, asking questions, recording) to learn new information is likely to allow additional time for processing, thus improving memory for the material.   Allowing sufficient time to complete tasks will reduce frustration and help to ensure completion.  Language: Strategies to help with Word Finding. Not being able to find a word when you need it is a common and very annoying problem. It is not strictly a memory issue, but more a filing and retrieval  issue. You know the word or name you want, but it cannot be found in your brain file. It is like having all the files in your file cabinet emptied on the floor. Every piece of information is there but finding it can be a challenge. One strategy that may help is working with a speech pathologist/therapist to learn techniques to decrease word finding problems. Some of those strategies/techniques include the following:  Use circumlocutions. Describe the object you're trying to name instead of naming it.  Recite you're A, B, Cs. Go through the alphabet to see if a letter triggers finding the word.  Picture it. Try to visualize the spelling or writing of the word.  Relax. The harder you try to force yourself to come up with the word, the more frustrated you get and the worse you function.   Write it down. In situations where using correct words is critical, such as communicating on the radio while flying, write down the key words so that they are in front of you if needed.  Use word association. For words or names you continually misfile and can't find, try to come up with a word association, something that reminds you of the word or name.  Write a script. Try scripting something important that you want to say. Either write it out or practice it beforehand, so that you get it right.  Play word games. Doing crossword puzzles and playing word finding games may help your brain become more efficient at filing and retrieving words.  Executive Functioning:  Dont attempt to multi-task.  Separate tasks so that each can be completed one at a time.  Consider using a calendar/day planner, as that may be effective to help you plan and stay on track.  Color-coding specific tasks by importance may add additional benefit to your planner.  Break down large projects into smaller tasks and write down the steps to completing the task.  Taking notes while reading can help with recall.  Storing Information: Use the below strategies to  help you further enhance how information is stored.  Rehearse - Immediately after seeing/hearing something, try to recall it.  Wait a few minutes, then check again.  Gradually lengthen the intervals between rehearsals.  Repetition of learned material is critical to ensure storage of information to be learned. Self-test at home to ensure learning.  Write down important information to improve your attention and focus and to have something to look back on when you need to recall it.  Make sure the person doesnt rattle off, but presents in a clear, logical, and unhurried manner.   Recalling Information:  Jog your memory - Lose something?  Think back to when you last had it.  What did you do next?  And after that?  Mentally walk yourself through each activity that followed.  Prodding your memory this way may enable you to recall the location of the missing item.  Use a cue - Symbolic reminders (the proverbial string around the finger) are helpful.  So too are memos, timers, calendar notes, etc.--keep them in visible, appropriate places.  Get organized - Have fixed locations for all important papers, key phone numbers, medications, keys, wallet, glasses, tools, etc.  Develop routines - Routines can anchor memories so they do not drift away.    Sleep Hygiene: Poor sleep has a negative effect on cognition. Several strategies have been shown to improve sleep:   Caffeine intake in the afternoon and evening, as well as stuffing oneself at supper, can decrease the quality of restful sleep throughout the night.   Bedtime and wake-up times should be consistent every night and morning so the body becomes used to a single routine, even on the weekends.  Engage in daily physical activity, but not 2-3 hours before bedtime.   No technology use (television, computer, iPad) 1-2 hours before bed.   Have a wind down routine (e.g., soft lights in the house, bath before bed, reduced fluid intake, songs, reading, less noise) to promote  sleep readiness.   Visit the www.sleepfoundation.org for more strategies.   Resources: Consider resources for support through the Governors Office of Elderly Affairs (http://goea.[place].gov/), [place] Chapter of the Alzheimers Association (www.alz.org/[place]/), the Family Caregiver [place] (www.caregiver.org), the American Psychological Association (http://www.apa.org/pi/about/publications/caregivers/consumers/index.aspxconsumers/index.aspx), and the [facility] on Aging (www.coastseniors.org).    Practice good cognitive/brain health hygiene:  Get a good nights sleep, as this can enhance alertness and cognition.  Keep your brain active. Find activities to stay mentally active, such as reading, games (cards, checkers), puzzles (crosswords, Sudoku, jig saw), crafts (models, woodworking), gardening, or participating in activities in the community.  Stay socially engaged. Continue staying active with your family and friends.  Find information regarding brain health and various disease processes at [employer] (www.[employer]).    Managing Vascular Risk Factors: A personal history of disorders that affect the cardiovascular system (e.g., hypertension, hyperlipidemia, diabetes) can have a negative impact on brain functioning especially over many years. Therefore, it is very important for Dr. Caldwell to maintain good control over risk factors. The following is recommended:  Take all medications as prescribed and follow-up with recommendations above.  Get regular physical exercise to the extent that it is possible.   Follow a Mediterranean diet, which emphasizes plant-based foods, whole grains, fish and healthy fats, such as olive oil, and has been shown to be brain protective.   Check blood pressure, cholesterol levels, blood sugar, and others as appropriate.    Prepare for the future: Dr. Caldwell and caregivers should consider formal arrangements to allow a designated person to make medical and  financial decisions for Dr. Caldwell, should he become unable to do so.  Options to consider include designating a healthcare proxy, medical and/or financial power of , and completing advanced directives for healthcare decisions and estate planning (e.g., finalizing a will).  If cost is prohibitive, [facility] Legal Services (https://[facility].org/) provides free  for individuals with low income.     Recommendations: Consider stress management techniques to reduce anxiety and respond to anxiety as it arises. Heres a simple three-minute exercise you can use no matter where you are:  1. Sit or stand up nice and tall. Let your eyes relax. Relax your jaw. Let your shoulders relax down your back and start to focus your attention on your breath.  2. Breathe all the way in through your nose, filling your belly with your breath. Then, breathe all the way out through your nose. Its that simple.  3. Start to breathe into a count of three. Inhale for one, two, three. Then, exhale into a count of six: six, five, four, three, two and one.  4. Repeat. Inhale: one, two, three. Exhale: six, five, four, three, two and one.  5. Continue just like this for three minutes, or as long as you'd like. You can set a timer on your phone at the beginning of your practice.    Should your mind wander, simply notice, without judgment. Observe your thought. And let it go. Return your breath to your attention as many times as it takes, training your mind in the same way we train our bodies.    https://blog.[facility].org/articles/3-minute-mindful-breathing-exercise-for-anxiety-relief    https://blog.[facility].org/articles/shake-it-off-in-the-new-year-simple-stress-relief-techniques     Thank you for allowing me to participate in Mr. Caldwell's care.  If you have any questions, please contact me at [phone].    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 The background information is taken from that interview, with updates.    Dr. Caldwell has active problems noted below. He initially visited Neurology on 02/20/2025, reporting memory changes. Performance on a brief mental status measure was reduced (Vaughn Cognitive Assessment [MOCA] = 22/30). Physical exam was notable for difficulty with rapid alternating movement on the left and difficulty with tandem gait; on a specialized movement exam, speech was mildly hypophonic, with postural tremor on the left and bilateral upper extremity action tremor, and intermittent left thumb tremor at rest. He had mild bradykinesia bilaterally. Tone was paratonic making it difficulty to assess for rigidity. He was able to rise slowly without using his arms; posture was normal. He was able to do a three step turn without festination or freezing; stride was normal with a semi wide-based. Arm swing was okay, with mild left hand intermittent tremor.    Cognitive Symptoms:  Type/Examples:   Attention: He reported mild changes in attention. He has to remind himself of what he needs to do.  Mental Speed: He has a lot of different perspectives in mind and takes time to figure out what he should do.  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 He has vivid memories of 50 years ago but has trouble recalling more recent information. He said cues and reminders are helpful. He forgets names and social events that he "should have known."   Language: He reported that he has "lost command of some words that I would like to use." He tries to find an alternative word; he uses an "ABC" technique, which is helpful. He may also think of the word later. He denied comprehension difficulty if he can hear the information.  Visuospatial/Perceptual: None reported  Executive Functioning: He reported he is more careful " about setting his agenda. His wife said he completes tasks well and has always been very organized.  Onset: Per recent records, memory changes started in approximately 2023, but older records noted first report of memory concerns in 2019. During the interview, they described the change as gradual.  Course: Progressive     Current Functional Status/Needs:  ADLs  Self-Care Eating Safety Other   Independent Independent No concerns      Instrumental IADLs:   Driving Medications/Health Household Finances   Independent Independent Independent Independent, with split responsibilities     Psychiatric/Behavioral Symptoms:  Mood:  Depression/Dysphoria Anxiety/Fearfulness Irritability   Records noted a history of depressed mood related to aging; [facility]. He reported concern regarding friends' aging/health symptoms. Records noted anxiety and report of a history of trauma. He reported mild anxiety. He reported feeling that he is in a good place relative to 50 years ago. Records noted labile mood. His wife noted improvement recently with medication.     Behavior:  Agitation/Resistance Delusions/Paranoia Hallucinations   None reported None reported None reported     Apathy/Motivation Repetitive/Restlessness Other   None reported outside of reduced activity due to aging. He collects in uses fidget toys, which is a recent change. His wife reported he has always been somewhat obsessive.      Neurovegetative:  Sleep/Nighttime  [employer]   Records noted poor quality sleep with bizarre dreams.  He has some snoring but has not been assessed for sleep apnea. He said he goes to sleep at 1am and sleeps until 12pm. Over the last couple of weeks, he has had bizarre dreams, and he wakes up holding his arms crossed. They denied other movement. He reported weight loss over the last 6-9 months with dietary changes. His wife said he eats sweets, which is not a change. Former marathon runner;  stopped after heart issues. He was engaging in yard work but backed away because it was physically demanding. He is engaged in physical therapy.     Suicidal/Homicidal Ideation: None reported    Physical Symptoms: Records noted he feels off balance; he also has a tremor. He has changes in handwriting and difficulty with fine motor movements; he is right-handed. He has hearing loss but does not like to wear hearing aids; he has tinnitus. He wears glasses; no recent changes. His blood pressure has been stable. He denied dizziness.     PERTINENT BACKGROUND INFORMATION   SOCIAL HISTORY    Family Status: ; two children  Current Living Situation: lives with spouse in [place] and in [place] (apartment at one of their children's homes)  Primary Source of Support: family  Daily Activities: active with family in [place]; Jehovah's witness; limited hobbies; television ([employer])  Stressors: aging  Other Factors:  Educational Level: No learning or attention difficulty in school; he described some difficulty with personal discipline; graduated high school; graduated college; earned a [employer] (Educational Administration)  Occupational Status and History: Retired from the [place] [employer] (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); supervisor, 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 , 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, 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)  Enlisted in the [employer] ([id number]; was not deployed to [place]; was stationed in [place]; was stationed at [employer] in [place], 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); retired with an honorable discharge; nominated for an [employer] commendation medal but turned it down  Other: No developmental delays or early childhood illnesses; he was diagnosed with vision difficulty in early adolescence and started wearing glasses; English is his primary language    Family History   Problem Relation Name Age of Onset    Coronary artery disease  Mother Sumas     Diabetes Mother Sumas     Heart disease Mother Sumas     Depression Mother Sumas     Coronary artery disease Father Javon     Heart disease Father Javon     Heart disease Brother Javon     Early death Brother Javon     Heart disease Brother      Schizophrenia Brother      Heart disease Brother Candelario     Mental illness Brother Candelario     Amblyopia Neg Hx      Blindness Neg Hx      Cataracts Neg Hx      Glaucoma Neg Hx      Macular degeneration Neg Hx      Retinal detachment Neg Hx      Strabismus Neg Hx       Family Neurologic History: Negative for heritable risk factors  Family Psychiatric History: Brother with mental health concerns    MEDICAL STATUS  Patient Active Problem List   Diagnosis    Benign essential hypertension    Coronary artery disease involving native coronary artery of native heart with angina pectoris    Familial hypercholesterolemia    BPH (benign prostatic hyperplasia)    S/P CABG (coronary artery bypass graft)    S/P PTCA (percutaneous transluminal coronary angioplasty)    Osteopenia    AAA (abdominal aortic aneurysm)    Nuclear sclerosis of both eyes    Vitreous detachment of both eyes    Refractive error    Senile nuclear sclerosis    Nuclear sclerosis of right eye    Mild neurocognitive disorder    Decreased hearing of both ears    Right hip pain    Mild episode of recurrent major depressive disorder    PCO (posterior capsular opacification), bilateral    Pseudophakia    PCO (posterior capsular opacification), right    Adjustment disorder with anxiety    Elevated lipoprotein(a)    Generalized anxiety disorder    Parkinsonism, unspecified    Aorto-iliac atherosclerosis     Past Medical History:   Diagnosis Date    AAA (abdominal aortic aneurysm)     Arthritis     BPH (benign prostatic hyperplasia)     Cataract     Colon polyps 2006    Coronary artery disease     Depression     Ex-smoker     Hx of psychiatric care     Lexapro, valium    Mood disorder     ziggy    Old  "MI (myocardial infarction) 10/07/2022    ELINA (obstructive sleep apnea)     Osteopenia 4/15 aylin 4/20    Other insomnia 01/07/2019    Parkinsonism, unspecified 1/10/2025    Prostate cancer 03/12/2020    S/P CABG (coronary artery bypass graft) 08/09/2013    S/P PTCA (percutaneous transluminal coronary angioplasty) 08/09/2013    Skin cancer 05/10/2023    Therapy      Past Surgical History:   Procedure Laterality Date    BIOPSY WITH TRANSRECTAL ULTRASOUND (TRUS) GUIDANCE N/A 05/13/2021    Procedure: BIOPSY, WITH TRANSRECTAL US GUIDANCE/ URONAV;  Surgeon: Pawel Salinas MD;  Location: [facility] OR [address] [place] [address];  Service: Urology;  Laterality: N/A;  30 min    CATARACT EXTRACTION W/  INTRAOCULAR LENS IMPLANT Left 07/26/2016    Dr. Norman    CATARACT EXTRACTION W/  INTRAOCULAR LENS IMPLANT Right 08/09/2016    Dr. Norman    CHOLECYSTECTOMY      COLONOSCOPY N/A 01/19/2023    Procedure: COLONOSCOPY;  Surgeon: Zachery Ramos MD;  [facility] ENDO;  Service: Endoscopy;  Laterality: N/A;    CORONARY ANGIOPLASTY      CORONARY ARTERY BYPASS GRAFT      HERNIA REPAIR      PROSTATE SURGERY  March 2019    Prostate biopsy    ptca      rca    VASECTOMY  1986?     Updated/Relevant Neurologic History:  Falls: None reported recently; he tripped and fell a year ago and has since been more careful.  TBI: None reported  Seizures: None reported  Stroke: None reported  Movement Concerns: Parkinsonism on exam  Prior Neuropsychological Testing: None reported  Referral Diagnosis:   R41.3 (ICD-10-CM) - Memory loss   G20.C (ICD-10-CM) - Parkinsonism, unspecified Parkinsonism type     Recent Labs  No results found for: "JGRJFVYX38"  No results found for: "RPR"  No results found for: "FOLATE"  Lab Results   Component Value Date    TSH 5.202 (H) 02/20/2025     No results found for: "LABA1C", "HGBA1C"  Lab Results   Component Value Date    LIL31NBSJ Non-reactive 02/20/2025     pTau-181 04/08/2025 1.08 (elevated)  Neurofilament Light Chain " 02/20/2025 19.1 WNL    Imaging    Results for orders placed or performed during the hospital encounter of 03/06/25   MRI Brain Without Contrast    Narrative    EXAM:  MRI BRAIN WITHOUT CONTRAST    CLINICAL HISTORY:  Memory loss;Parkinsonian syndrome; Other amnesia.    COMPARISON:  Head CT dated 10/15/2024    FINDINGS:  Intracranial contents:There is no acute abnormality.  There is no intracranial hemorrhage.  There is no mass.  There are no regions of restricted diffusion to suggest acute infarction.  There is generalized volume loss with prominence of the extra-axial spaces.  Volume loss is global but may be more prominent in the temporal lobes bilaterally.  There is no hydrocephalus or midline shift.  There is only very minimal periventricular white matter FLAIR hyperintense signal.  These changes likely reflect sequelae of minimal chronic small vessel disease.  The basilar cisterns are open.  Flow voids indicating patency are present in the major vessels at the base of the brain.  The cerebellar tonsils are normal position.  Sellar structures are normal.    The patient has had bilateral lens replacement surgery.  The orbits are otherwise grossly normal.    Extracranial contents, calvarium, soft tissues:Baseline marrow signal is mildly heterogeneous but nonspecific.  There is trace mucosal thickening in the ethmoid air cells.  Otherwise, the paranasal sinuses and mastoid air cells are clear.      Impression    1. There is no acute abnormality.  There is global volume loss with only minimal nonspecific white matter change.  There is no hemorrhage, mass or acute infarction.  There is no change compared to the prior CT.      Electronically signed by: Porfirio Lees MD  Date:    03/06/2025  Time:    16:56     Current Outpatient Medications:     aspirin (ECOTRIN) 81 MG EC tablet, Take 81 mg by mouth every evening. MD Sylvia Pisano RN; DIANA DASILVA Staff Caller: Unspecified (Today,   2:44 PM) As per notes no need to hold ASA.  If he can stop it that is fine. Likely less hematuria after biopsy but not required, Disp: , Rfl:     atorvastatin (LIPITOR) 80 MG tablet, TAKE 1 TABLET(80 MG) BY MOUTH EVERY NIGHT, Disp: 90 tablet, Rfl: 3    dutasteride (AVODART) 0.5 mg capsule, TAKE 1 CAPSULE(0.5 MG) BY MOUTH EVERY DAY, Disp: 30 capsule, Rfl: 11    ezetimibe (ZETIA) 10 mg tablet, TAKE 1 TABLET(10 MG) BY MOUTH EVERY DAY, Disp: 90 tablet, Rfl: 3    fluticasone propionate (FLONASE) 50 mcg/actuation nasal spray, INHALE 1 SPRAY IN EACH NOSTRIL EVERY DAY, Disp: 32 g, Rfl: 2    ibuprofen (ADVIL,MOTRIN) 200 MG tablet, Take 200 mg by mouth as needed for Pain. Hold 1 week prior to surgery, Disp: , Rfl:     levothyroxine (SYNTHROID) 50 MCG tablet, Take 1 tablet (50 mcg total) by mouth before breakfast., Disp: 90 tablet, Rfl: 1    metoprolol succinate (TOPROL-XL) 25 MG 24 hr tablet, TAKE 1 TABLET(25 MG) BY MOUTH EVERY EVENING, Disp: 90 tablet, Rfl: 4    multivit with minerals/lutein (MULTIVITAMIN 50 PLUS ORAL), Take 1 tablet by mouth once daily., Disp: , Rfl:     sertraline (ZOLOFT) 100 MG tablet, TAKE 2 TABLETS(200 MG) BY MOUTH EVERY DAY, Disp: 180 tablet, Rfl: 2    silodosin (RAPAFLO) 8 mg Cap capsule, Take 1 capsule (8 mg total) by mouth once daily., Disp: 30 capsule, Rfl: 11    vit A/vit C/vit E/zinc/copper (ICAPS AREDS ORAL), Take 1 capsule by mouth once daily., Disp: , Rfl:     Updated/Relevant Psychiatric History: His father and mother  suddenly when he was a young adult, within a few years of each other.    Substance Use:  History of alcohol and nicotine use but no current use.    MENTAL STATUS AND OBSERVATIONS:  APPEARANCE: Casually dressed and adequate grooming/hygiene. He wore glasses. He occasionally closed his eyes when thinking.   ALERTNESS/ORIENTATION: Attentive and alert. Fully oriented (x5) to time and place  GAIT: Unremarkable  MOTOR MOVEMENTS/MANNERISMS: Slight right hand tremor when writing  "and drawing  SPEECH/LANGUAGE: Normal in rate, rhythm, tone, and volume. Occasional word finding difficulty noted. Expressive and receptive language was normal.  STATED MOOD/AFFECT: The patients stated mood was "better, since I met you." Affect was congruent with stated mood.   INTERPERSONAL BEHAVIOR: Rapport was quickly and easily established   SUICIDALITY/HOMICIDALITY: Denied  HALLUCINATIONS/DELUSIONS: None evidenced or endorsed  THOUGHT PROCESSES/INSIGHT: Thoughts seemed logical and goal-directed. His responses were detailed and deliberate.    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 He exhibited occasional word finding difficulty. He reported having hearing problems and accepted wearing an amplifier; however, the battery in the amplifier was corroded and the examiner was unable to locate any spare batteries. He said he was able to hear the examiner when she spoke in a raised voice. He had a precise approach to writing tasks, which likely impacted his scores on timed tasks. Scores on stand-alone and embedded performance validity measures were variable, with 1/3 stand alone and 1/2 embedded measures outside of expectation. The current results, therefore, may underestimate the patient's current functioning and are considered to be a minimal estimate.     PROCEDURES/TESTS ADMINISTERED:  In addition to performing a review of pertinent medical records, reviewing limits to confidentiality, conducting a clinical interview, and explaining procedures, the following measures were administered by Ivy Mcwilliams, assistant to a psychologist, under the supervision of Poonam Landrum, PhD, ABPP: MSVT; Test of Premorbid Functioning; CITH; DCT; Wechsler Adult Intelligence Scale, Fourth Edition (WAIS-IV) selected subtests; Wechsler Memory Scale, Fourth Edition (WMS-IV), selected subtests; Neuropsychological Assessment Battery (NAB), selected subtests; Verbal fluency tests (FAS & " animal naming; Jaya et al., 2004 norms); [place] Verbal Learning Test, Third Edition (CVLT-3); Ayala Judgment of Line Orientation; Symbol Digits Modalities Test (SDMT); Brief Visual Memory Test, Revised (BVMT-R); Trail Making Test, parts A and B (Jaya et al., 2004 norms); [place] Card Sorting Test-64 (WCST-64), Grooved Pegboard Test (Jaya et al., 2004 norms); Reggie Complex Figure Test (RCFT, copy); Geriatric Depression Scale (GDS-30); Generalized Anxiety Disorder Questionnaire (THOMAS-7). Manual norms were used unless otherwise indicated.      TEST RESULTS    PREMORBID FUNCTIONING Raw Score Type of Standardized Score Standardized Score Percentile/CP Descriptor   TOPF simple dem. eFSIQ -  90 High Average   INTELLECTUAL FUNCTIONING Raw Score Type of Standardized Score Standardized Score Percentile/CP Descriptor   WAIS-IV         WMI - SS 89 23 Low Average   COGNITIVE SCREENING Raw Score Type of Standardized Score Standardized Score Percentile/CP Descriptor   Orientation Questions 10 - - - -   Orientation - Place 5/5 - - - -   Orientation - Date 5/5 - - - -   LANGUAGE FUNCTIONING Raw Score Type of Standardized Score Standardized Score Percentile/CP Descriptor   TOPF Word Reading 43  53 Average   NAB Naming 29 Tscore 46 34 Average   NAB Naming Percent Correct After Semantic Cuing 0 - - 38 WNL   NAB Naming Percent Correct After Phonemic Cuing 50 - - 47 WNL   COWAT 37 Tscore 43 24 Low Average   Animal Naming 12 Tscore 33 4 Below Average   VISUOSPATIAL FUNCTIONING Raw Score Type of Standardized Score Standardized Score Percentile/CP Descriptor   Ayala JOLO 22 ss 10 50 Average   WAIS-IV Block Design 24 ss 9 37 Average   RCFT Copy 32 - - >16 WNL   RCFT Time to Copy 578 - - <1 Exceptionally Low   BVMT-R Copy 10 - - - -   LEARNING & MEMORY Raw Score Type of Standardized Score Standardized Score Percentile/CP Descriptor   CVLT-3         Immediate Recall: Trial 1 Correct 4 ss 8 25 Average   Immediate Recall:  Trial 2 Correct 5 ss 7 16 Low Average   Immediate Recall: Trial 3 Correct 5 ss 6 9 Low Average   Immediate Recall: Trial 4 Correct 6 ss 6 9 Low Average   Immediate Recall: Trial 5 Correct 10 ss 10 50 Average   List B Correct 3 ss 7 16 Low Average   Delayed Recall: Short Delay Free Recall Correct 4 ss 6 9 Low Average   Delayed Recall: Short Delay Cued Recall Correct 7 ss 7 16 Low Average   Delayed Recall: Long Delay Free Recall Correct 2 ss 4 2 Below Average   Delayed Recall: Long Delay Cued Recall Correct 5 ss 5 5 Below Average   Yes/No Recognition: Total Hits 11 ss 6 9 Low Average   Yes/No Recognition: False Positives 7 ss 8 25 Average   Yes/No Recognition: Recognition Discriminability 1.3 ss 6 9 Low Average   Yes/No Recognition: Recognition Discriminability Nonparametric 75 ss 6 9 Low Average   Recall Errors: Total Intrusions 11 ss 7 16 Low Average   Forced Choice Recognition: Total Hits 13 ss <2.0      Standard Score Summary: Trials 1-5 Correct - SS 85 16 Low Average   Standard Score Summary: Delayed Recall Correct - SS 75 5 Below Average   Standard Score Summary: Total Recall Correct - SS 79 8 Below Average   MSVT         MSVT IR 80 - - - -   MSVT  - - - -   MSVT Cons 80 - - - -   MSVT PA 60 - - - -   MSVT FR 40 - - - -   CITH 10 - - - -   WMS-IV Subtests         LM I 20 ss 7 16 Low Average   LM II 6 ss 6 9 Low Average   LM Recognition 18 - - 26-50 Average   BVMT-R         IR 9 Tscore 31 3 Below Average   DR 0 Tscore 19 <0.1 Exceptionally Low    Discrimination Index 3 - - 3-5 BNL   ATTENTION/WORKING MEMORY Raw Score Type of Standardized Score Standardized Score Percentile/CP Descriptor   WAIS-IV Digit Span 16 ss 6 9 Low Average         DS Forward 8 ss 8 25 Average         DS Backward 7 ss 9 37 Average         DS Sequence 1 ss 2 0.4 Exceptionally Low          Longest Digit Forward 5 - - - -         Longest Digit Backward 4 - - - -         Longest Digit Sequence 2 - - - -   RDS 8 - - - -   WAIS-IV Arithmetic  13 ss 10 50 Average   MENTAL PROCESSING SPEED Raw Score Type of Standardized Score Standardized Score Percentile/CP Descriptor   WAIS-IV Symbol Search 11 ss 6 9 Low Average   SDMT Written 20 zscore -2.00 2 Below Average   SDMT Oral 25 zscore -2.00 2 Below Average   TMT A  40 Tscore 43 24 Low Average   TMT A Total Err. 0 - - - -   DCT 18 - - - -   EXECUTIVE FUNCTIONING Raw Score Type of Standardized Score Standardized Score Percentile/CP Descriptor   TMT B N/A Tscore N/A N/A N/A   TMT B D/C Time 315 - - - -   TMT B Total Err. 3 - - - -   TMT B Seq Err. 1 - - - -   TMT B Set-Loss Err. 2 - - - -   TMT B Time-DC Err. 6 - - - -   WCST-64         Total Correct 28 SS - - -   Total Errors 36 SS 75 5 Below Average   Perseverative Resp. 28 SS 70 2 Below Average   Perseverative Err. 23 SS 72 3 Below Average   Nonperseverative Err. 13 SS 86 18 Low Average   Concept. Level Response 10 SS 68 2 Below Average   Categories Completed 0 - - 6-10 Below Average   FMS 1 - - N/A N/A   Learning to Learn N/A - - N/A N/A   FRONTOMOTOR  Raw Score Type of Standardized Score Standardized Score Percentile/CP Descriptor   GPT  Tscore 37 9 Low Average   GPT  Tscore 41 18 Low Average   MOOD & PERSONALITY Raw Score Type of Standardized Score Standardized Score Percentile/CP Descriptor   GDS-30 19 - - - Mild   THOMAS-7 14 - - - Moderate   ss = scaled score (mean = 10, SD = 3); SS = standard score (mean = 100, SD = 15); Tscore mean = 50, SD = 10; zscore (mean = 0.00, SD = 1)     BILLING  Service Description CPT Code Minutes Units   Test Evaluation Services --  --   Neuropsychological testing evaluation services by physician [phone]   Each additional hour by physician [id number]  1   Test Administration and Scoring --  --   Psychological or neuropsychological test administration and scoring by physician [id number]  0   Each additional 30 minutes by physician [id number]  0   Psychological or neuropsychological test administration and scoring by  technician [phone]   Each additional 30 minutes by technician [id number]  5

## 2025-04-22 NOTE — PROGRESS NOTES
NEUROPSYCHOLOGY FEEDBACK (TELEHEALTH)    Referral Information  Name: Peter Caldwell  MRN: [id number]  : 1945  Age: 79 y.o.  Race: White  Gender: male  Referring Provider: Miladys Atkins Np  [address] [facility] [address]  [address] [place] [address]  [place]  Telemedicine:   The patient location is: [place], [place]  The provider location is: [place], [place]  The chief complaint leading to consultation/medical necessity is: Feedback from neuropsychological evaluation.  Visit type: Virtual visit with synchronous audio and video  Total time spent with patient: 31 minutes  Each patient to whom he or she provides medical services by telemedicine is:  (1) informed of the relationship between the physician and patient and the respective role of any other health care provider with respect to management of the patient; and (2) notified that he or she may decline to receive medical services by telemedicine and may withdraw from such care at any time.    Consent/Emergency Plan: The patient expressed an understanding of the purpose of the evaluation and consented to all procedures. I informed the patient of limits to confidentiality and discussed an emergency plan.    NOTE   Peter Caldwell attended a feedback session today.  We discussed the results of the neuropsychological evaluation.  I provided Mr. Caldwell with a copy of the evaluation report via [phone] and gave time to discuss questions and concerns.    Poonam Landrum, Ph.D., ABPP  Board Certified in Clinical Neuropsychology  [facility] - Department of Neurology

## 2025-04-23 ENCOUNTER — OFFICE VISIT (OUTPATIENT)
Dept: NEUROLOGY | Facility: CLINIC | Age: 80
End: 2025-04-23
Payer: MEDICARE

## 2025-04-23 DIAGNOSIS — G31.84 MILD NEUROCOGNITIVE DISORDER: ICD-10-CM

## 2025-04-23 DIAGNOSIS — F41.1 GENERALIZED ANXIETY DISORDER: ICD-10-CM

## 2025-04-23 DIAGNOSIS — G20.C PARKINSONISM, UNSPECIFIED PARKINSONISM TYPE: Primary | ICD-10-CM

## 2025-04-23 DIAGNOSIS — F33.0 MILD EPISODE OF RECURRENT MAJOR DEPRESSIVE DISORDER: ICD-10-CM

## 2025-04-23 PROCEDURE — 96132 NRPSYC TST EVAL PHYS/QHP 1ST: CPT | Mod: 95,,, | Performed by: PSYCHIATRY & NEUROLOGY

## 2025-04-23 PROCEDURE — 99499 UNLISTED E&M SERVICE: CPT | Mod: 95,,, | Performed by: PSYCHIATRY & NEUROLOGY

## 2025-04-29 ENCOUNTER — OFFICE VISIT (OUTPATIENT)
Dept: NEUROLOGY | Facility: CLINIC | Age: 80
End: 2025-04-29
Payer: MEDICARE

## 2025-04-29 VITALS
HEIGHT: 73 IN | WEIGHT: 198.31 LBS | BODY MASS INDEX: 26.28 KG/M2 | SYSTOLIC BLOOD PRESSURE: 102 MMHG | DIASTOLIC BLOOD PRESSURE: 54 MMHG | RESPIRATION RATE: 14 BRPM | HEART RATE: 62 BPM

## 2025-04-29 DIAGNOSIS — F41.1 GENERALIZED ANXIETY DISORDER: ICD-10-CM

## 2025-04-29 DIAGNOSIS — Z51.81 THERAPEUTIC DRUG MONITORING: ICD-10-CM

## 2025-04-29 DIAGNOSIS — G31.84 MILD NEUROCOGNITIVE DISORDER: Primary | ICD-10-CM

## 2025-04-29 PROCEDURE — 99999 PR PBB SHADOW E&M-EST. PATIENT-LVL IV: CPT | Mod: PBBFAC,,, | Performed by: NURSE PRACTITIONER

## 2025-04-29 PROCEDURE — 99214 OFFICE O/P EST MOD 30 MIN: CPT | Mod: PBBFAC,PO | Performed by: NURSE PRACTITIONER

## 2025-04-29 PROCEDURE — 99214 OFFICE O/P EST MOD 30 MIN: CPT | Mod: S$PBB,,, | Performed by: NURSE PRACTITIONER

## 2025-04-29 RX ORDER — DONEPEZIL HYDROCHLORIDE 5 MG/1
5 TABLET, FILM COATED ORAL NIGHTLY
Qty: 30 TABLET | Refills: 2 | Status: SHIPPED | OUTPATIENT
Start: 2025-04-29 | End: 2025-07-28

## 2025-04-29 NOTE — ASSESSMENT & PLAN NOTE
Diagnostic testing to date reviewed with pt and wife as noted   Discussed further treatment / testing options   He is not interested in more invasive biomarker testing, such as LP. He is also not interested in IV therapy if applicable.   He would like to start donepezil today  EKG in 1-2 weeks for drug monitoring   Recent Rx Synthroid due to hypothyroidism - per PCP   He will let me know if he needs a referral to our  dept

## 2025-04-29 NOTE — PROGRESS NOTES
NEUROLOGY  Outpatient Visit     [facility]  [address] [facility] [address], [place] [address]  [phone] (office) / [phone] (fax)    Patient Name:  Peter Caldwell  :  1945  MR #:  [id number]  Acct #:  [id number]    Date of  Visit: 2025    Other Physicians:  Ricky Delaney MD (Primary Care Physician)      CHIEF COMPLAINT: Memory Loss (Follow up 30 min okay per hl )        Interval history:  25:  Peter Caldwell is a 79 y.o. R-handed male seen in f/u for memory loss    Medical history is significant for depression, anxiety, hearing loss, CAD s/p CABG, HTN, AAA without rupture, aortoiliac atherosclerosis, BPH, melanoma of skin, prostate cancer    Here today with wife, Dee     MRI brain showed minimal vascular disease and redemonstrated global atrophy.     NP testing -- difficulty with processing speed, executive functioning, motor speed, and learning and memory, with variable improvement with recognition formats. Report of cognitive changes with independence in activities of daily living suggests a diagnosis of mild neurocognitive disorder. Overall information is suggestive of a neurodegenerative process, with concern for an Alzheimer's process that would not fully explain the parkinsonian findings.    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 Completed PT, trying to do HEP. No falls.   [facility]. Also seeing [facility] via telehealth, wants to give that one more try (bad experience with telehealth but liked provider).   Taking 150 mg sertraline due to SE from 200 mg dose.   BP always relatively low. Enrolled in digital HTN. Takes BB since CABG.     HISTORY OF PRESENT ILLNESS:  Peter Caldwell is a 79 y.o. R-handed male seen in consultation for memory loss per No ref. provider found    Medical history is significant for depression, anxiety, hearing loss, CAD s/p CABG,  "HTN, AAA without rupture, aortoiliac atherosclerosis, BPH, melanoma of skin, prostate cancer    Here today with wife, Dee   Retired from [place] [employer], PhD level education. Retired [employer].   They between [place], primary is [place]     He notes ST memory loss, like forgetting where he places items or what he is supposed to do on a given day. He has trouble recalling names / words, very frustrating. Hard to follow plots on shows. Easily looses train of thought, notes executive dysfunction. Very meticulous, keeps detailed records of all appts, writes to do list daily. Manages his own meds, etc. He drives without difficulty. Has never had good sense of direction. He does note VS issues, like when parking his car.     Onset was approximately 2 years ago, but he feels that it is worse within the past year.     Feels off balance. Not falling. Doing PT now for this. No dizziness or neuropathy. Harder getting in or out of a car, rising from a chair. No sig gait change, like shuffling. When getting dressed, hard to stand on one leg.     Also notes tremor. Feels that fine motor skills are affected. In both hands, more prominent on the L. Has had episodes when he wakes and his L hand is shaking vigorously. Handwriting is shaky or messy, he has to focus hard when writing or buttoning. Worse with anxiety.     Recently notes pills getting stuck, no other dysphagia. No voice change. Hearing loss is chronic. Has hearing aids, doesn't like them. Knows that he needs to try again.     Mood has been labile, like a "roller coaster." Difficultly navigating aging process. Used to travel often and now its harder as he ages. [facility]. Last visit in 11/2024. Increased Zoloft, but made lethargy worse. Has been seeing [facility] providers and on sertraline for many years for anxiety and depression. He collects and uses fidget toys "obsessively." Wife notes that he has always been "obsessive compulsive." It helps him stay " calm. Has always loved to eat sweets, no change in preferences. He has not had any hallucinations.     Poor quality of sleep with bizarre dreams. Self diagnosed as PTSD from his past career. Dreams about past work in admin. Chronic, but becoming more intense. No dream reenactment. EMR has ELINA, but he has never had sleep testing. He snores some. Sleeps MN - 11 am typically. 3-4 times up to urinate.   Fatigued, low energy, naps.     Treated for prostate cancer and now struggling with incontinence since.     No known FH of ND disease. Younger brother was schizophrenic.     Non smoker. ETOH during his time in the service, but no regular ETOH use since then.     Daily MV, compliant with Rx. ASA for CAD, no hx of CVA/TIA     Allergies:  Review of patient's allergies indicates:   Allergen Reactions    Oxycodone Other (See Comments)    Penicillins      Other reaction(s): Rash    Promethazine Other (See Comments)     paranoia    Percodan [oxycodone hcl-oxycodone-asa]      Sleep / nightmare problems         Current Medications:  Current Medications[1]    Past Medical History:  Past Medical History:   Diagnosis Date    AAA (abdominal aortic aneurysm)     Arthritis     BPH (benign prostatic hyperplasia)     Cataract     Colon polyps 2006    Coronary artery disease     Depression     Ex-smoker     Hx of psychiatric care     Lexapro, valium    Mood disorder     chalasani    Old MI (myocardial infarction) 10/07/2022    ELINA (obstructive sleep apnea)     Osteopenia 4/15 aylin 4/20    Other insomnia 01/07/2019    Parkinsonism, unspecified 1/10/2025    Prostate cancer 03/12/2020    S/P CABG (coronary artery bypass graft) 08/09/2013    S/P PTCA (percutaneous transluminal coronary angioplasty) 08/09/2013    Skin cancer 05/10/2023    Therapy        Past Surgical History:  Past Surgical History:   Procedure Laterality Date    BIOPSY WITH TRANSRECTAL ULTRASOUND (TRUS) GUIDANCE N/A 05/13/2021    Procedure: BIOPSY, WITH TRANSRECTAL US  "GUIDANCE/ URONAV;  Surgeon: Pawel Salinas MD;  Location: [address];  Service: Urology;  Laterality: N/A;  30 min    CATARACT EXTRACTION W/  INTRAOCULAR LENS IMPLANT Left 07/26/2016    Dr. Norman    CATARACT EXTRACTION W/  INTRAOCULAR LENS IMPLANT Right 08/09/2016    Dr. Norman    CHOLECYSTECTOMY      COLONOSCOPY N/A 01/19/2023    Procedure: COLONOSCOPY;  Surgeon: Zachery Ramos MD;  Location: [facility];  Service: Endoscopy;  Laterality: N/A;    CORONARY ANGIOPLASTY      CORONARY ARTERY BYPASS GRAFT      HERNIA REPAIR      PROSTATE SURGERY  March 2019    Prostate biopsy    ptca      rca    VASECTOMY  1986?       Family History:  family history includes Coronary artery disease in his father and mother; Depression in his mother; Diabetes in his mother; Early death in his brother; Heart disease in his brother, brother, brother, father, and mother; Mental illness in his brother; Schizophrenia in his brother.    Social History:   reports that he quit smoking about 54 years ago. His smoking use included cigarettes. He started smoking about 65 years ago. He has a 1.4 pack-year smoking history. He has never used smokeless tobacco. He reports that he does not currently use alcohol after a past usage of about 1.0 standard drink of alcohol per week. He reports that he does not use drugs.      REVIEW OF SYSTEMS:  As per HPI    PHYSICAL EXAM:  BP (!) 102/54 (BP Location: Left arm, Patient Position: Sitting)   Pulse 62   Resp 14   Ht 6' 1" (1.854 m)   Wt 89.9 kg (198 lb 4.9 oz)   BMI 26.16 kg/m²     General: Well groomed. No acute distress.  Pulmonary: Normal effort and rate.   Musculoskeletal: No obvious joint deformities, moves all extremities well.  Extremities: No clubbing, cyanosis or edema.   Psych: Pleasant, cooperative. Anxious during exam.     Neurological Exam  Mental Status  Awake and alert. Oriented to person, place, time and situation. Speech is normal. Language is fluent with no aphasia. " Fund of knowledge is appropriate for level of education.    Cranial Nerves  CN III, IV, VI: Extraocular movements intact bilaterally. Normal lids and orbits bilaterally.  CN V:  Right: Facial sensation is normal.  Left: Facial sensation is normal on the left.  CN VII:  Right: There is no facial weakness.  Left: There is no facial weakness.  CN XI: Shoulder shrug strength is normal.  CN XII: Tongue midline without atrophy or fasciculations.    Motor  Normal muscle bulk throughout. No fasciculations present. No abnormal involuntary movements. Strength is 5/5 throughout all four extremities.  No drift .    Sensory  Light touch is normal in upper and lower extremities.     Coordination  Right: Finger-to-nose normal.Left: Finger-to-nose normal.      .    * Specialized movement exam Gen: no masked faces, normal blink  Speech: mild hypophonic  Tremor:   Postural on L  BUE with action - mod   None at rest   Bradykinesia:  Deferred today  Tone: deferred   Gait:  From last visit  Able to rise without arms but slow  Normal posture  3 step turn, no festination or freezing   Normal stride, semi wide base  Ok arm swing bilat, mild L hand tremor intermittent   Pull Test: deferred         DIAGNOSTIC DATA:  I have personally reviewed provider notes, labs and imaging made available to me today.     Imaging:  Results for orders placed during the hospital encounter of 03/06/25    MRI Brain Without Contrast        Narrative  EXAM:  MRI BRAIN WITHOUT CONTRAST    CLINICAL HISTORY:  Memory loss;Parkinsonian syndrome; Other amnesia.    COMPARISON:  Head CT dated 10/15/2024    FINDINGS:  Intracranial contents:There is no acute abnormality.  There is no intracranial hemorrhage.  There is no mass.  There are no regions of restricted diffusion to suggest acute infarction.  There is generalized volume loss with prominence of the extra-axial spaces.  Volume loss is global but may be more prominent in the temporal lobes bilaterally.  There is no  "hydrocephalus or midline shift.  There is only very minimal periventricular white matter FLAIR hyperintense signal.  These changes likely reflect sequelae of minimal chronic small vessel disease.  The basilar cisterns are open.  Flow voids indicating patency are present in the major vessels at the base of the brain.  The cerebellar tonsils are normal position.  Sellar structures are normal.    The patient has had bilateral lens replacement surgery.  The orbits are otherwise grossly normal.    Extracranial contents, calvarium, soft tissues:Baseline marrow signal is mildly heterogeneous but nonspecific.  There is trace mucosal thickening in the ethmoid air cells.  Otherwise, the paranasal sinuses and mastoid air cells are clear.    Impression  1. There is no acute abnormality.  There is global volume loss with only minimal nonspecific white matter change.  There is no hemorrhage, mass or acute infarction.  There is no change compared to the prior CT.      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 He also reported fluctuating mood in the context of longstanding treatment for anxiety and increase in use of fidget toys. He is independent in basic and instrumental activities of daily living. Workup has suggested possible temporal atrophy and elevated pTau 181.     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 His premorbid intellectual abilities were estimated to be in the high average range. Basic auditory attention and working memory were largely intact, with the exception of reduced digit sequencing. Visuomotor and oral processing speed performances were below expectation, possibly due, in part, to a precise and measured approach to " "drawing tasks. He demonstrated difficulty on tasks of set-shifting and problem-solving, with reduced letter and category fluency. Naming was intact, with intact reading. Visual perception and block construction were intact. Drawing was intact when copying a complex figure, with some errors when copying simple figures. Learning, recall, and recognition of a word list were reduced. Learning and recall of stories was relatively reduced, with intact recognition. Visual learning, recall, and recognition were reduced. Fine motor speed and dexterity were reduced bilaterally. He endorsed mild depression and moderate anxiety.     In sum, despite variable performance validity, there is concern for cognitive change. Patterns of performance were mixed, with difficulty with processing speed, executive functioning, motor speed, and learning and memory, with variable improvement with recognition formats. Report of cognitive changes with independence in activities of daily living suggests a diagnosis of mild neurocognitive disorder. Overall information is suggestive of a neurodegenerative process, with concern for an Alzheimer's process that would not fully explain the parkinsonian findings. Additional workup is recommended. Symptoms of anxiety and depression and poor sleep are also likely contributing."    Cardiac:  Results for orders placed or performed in visit on 10/07/22   IN OFFICE EKG 12-LEAD (to [place])    Collection Time: 10/07/22 11:04 AM    Narrative    Test Reason : I10,I25.10,Z95.1,    Vent. Rate : 057 BPM     Atrial Rate : 057 BPM     P-R Int : 190 ms          QRS Dur : 078 ms      QT Int : 446 ms       P-R-T Axes : 004 -14 057 degrees     QTc Int : 434 ms    Sinus bradycardia  Otherwise normal ECG  When compared with ECG of 10-MAY-2021 10:23,  No significant change was found  Confirmed by Asif Sexton JR., MD ([id number]) on 10/7/2022 11:53:31 AM    Referred By: [email or web address]   SELF           Confirmed By:Asif RIVAS    " "Carlie       Labs:  Lab Results   Component Value Date    WBC 6.95 02/20/2025    HGB 14.0 02/20/2025    HCT 44.4 02/20/2025     02/20/2025    MCV 96 02/20/2025    RDW 14.3 02/20/2025     Lab Results   Component Value Date     02/20/2025    K 4.6 02/20/2025     02/20/2025    CO2 25 02/20/2025    BUN 9 02/20/2025    CREATININE 1.1 02/20/2025    GLU 81 02/20/2025    CALCIUM 9.5 02/20/2025     Lab Results   Component Value Date    PROT 6.6 02/20/2025    ALBUMIN 3.8 02/20/2025    BILITOT 0.6 02/20/2025    AST 40 02/20/2025    ALKPHOS 61 02/20/2025    ALT 32 02/20/2025     Lab Results   Component Value Date    INR 1.0 06/02/2009     Lab Results   Component Value Date    CHOL 122 09/16/2024    HDL 31 (L) 09/16/2024    LDLCALC 66.8 09/16/2024    TRIG 121 09/16/2024    CHOLHDL 25.4 09/16/2024     No results found for: "LABA1C", "HGBA1C"   No results found for: "YWKVWDOV32"  No results found for: "FOLATE"  Lab Results   Component Value Date    TSH 5.202 (H) 02/20/2025     Outside labs 3/2024  A1C 5.7  Vit D 42  LDL 67  RPR NR  B12 463  Folate > 20     Component      Latest Ref Rng 2/20/2025   HIV 1/2 Ag/Ab      Non-reactive  Non-reactive    Methlymalonic Acid      <0.40 umol/L 0.22    Neurofilament Light Chain, Plasma      <=42.1 pg/mL 19.1    Phospho-Tau (181P)      0.00 - 0.97 pg/mL 1.08 (H)    Free T4      0.71 - 1.51 ng/dL 0.55 (L)    Treponema Pallidum Antibodies (IgG, IgM)      Nonreactive  Nonreactive       Legend:  (H) High  (L) Low    ASSESSMENT & PLAN:  Peter Caldwell is a 79 y.o. R-handed male seen in f/u for memory loss.     Problem List Items Addressed This Visit          Neuro    Mild neurocognitive disorder - Primary    Overview   MOCA  22/30 Feb 2025    NP 4/2025 - [facility], suspected AD +/- other factors     MRI brain 3/2025 - global atrophy, minimal microangiopathy    Biomarkers:  Synone NEG   + p tau 181, - NFL         Current Assessment & Plan   Diagnostic testing to date reviewed with " pt and wife as noted   Discussed further treatment / testing options   He is not interested in more invasive biomarker testing, such as LP. He is also not interested in IV therapy if applicable.   He would like to start donepezil today  EKG in 1-2 weeks for drug monitoring   Recent Rx Synthroid due to hypothyroidism - per PCP   He will let me know if he needs a referral to our  dept               Psychiatric    Generalized anxiety disorder     Other Visit Diagnoses         Therapeutic drug monitoring                  Follow up:   3 months memory visit       I spent a total of 35 minutes on the day of the visit.    This includes face to face time with the patient, as well as non-face to face time preparing for and completing the visit (review of prior diagnostic testing and clinical notes, obtaining or reviewing history, documenting clinical information in the EMR, independently interpreting and communicating results to the patient/family and coordinating ongoing care).       I appreciate the opportunity to participate in the care of this patient. Please feel free to contact me with any concerns or questions.       Miladys Atkins, [facility]  [facility]  [address] [place] [address]           [1]   Current Outpatient Medications   Medication Sig Dispense Refill    aspirin (ECOTRIN) 81 MG EC tablet Take 81 mg by mouth every evening. MD Sylvia Pisano, RN; P Brad DASILVA Staff  Caller: Unspecified (Today,  2:44 PM)  As per notes no need to hold ASA.   If he can stop it that is fine. Likely less hematuria after biopsy but not required      atorvastatin (LIPITOR) 80 MG tablet TAKE 1 TABLET(80 MG) BY MOUTH EVERY NIGHT 90 tablet 3    dutasteride (AVODART) 0.5 mg capsule TAKE 1 CAPSULE(0.5 MG) BY MOUTH EVERY DAY 30 capsule 11    ezetimibe (ZETIA) 10 mg tablet TAKE 1 TABLET(10 MG) BY MOUTH EVERY DAY 90 tablet 3    fluticasone propionate (FLONASE) 50 mcg/actuation  nasal spray INHALE 1 SPRAY IN EACH NOSTRIL EVERY DAY 32 g 2    ibuprofen (ADVIL,MOTRIN) 200 MG tablet Take 200 mg by mouth as needed for Pain. Hold 1 week prior to surgery      levothyroxine (SYNTHROID) 50 MCG tablet Take 1 tablet (50 mcg total) by mouth before breakfast. 90 tablet 1    metoprolol succinate (TOPROL-XL) 25 MG 24 hr tablet TAKE 1 TABLET(25 MG) BY MOUTH EVERY EVENING 90 tablet 4    multivit with minerals/lutein (MULTIVITAMIN 50 PLUS ORAL) Take 1 tablet by mouth once daily.      sertraline (ZOLOFT) 100 MG tablet TAKE 2 TABLETS(200 MG) BY MOUTH EVERY  tablet 2    silodosin (RAPAFLO) 8 mg Cap capsule Take 1 capsule (8 mg total) by mouth once daily. 30 capsule 11    vit A/vit C/vit E/zinc/copper (ICAPS AREDS ORAL) Take 1 capsule by mouth once daily.      donepeziL (ARICEPT) 5 MG tablet Take 1 tablet (5 mg total) by mouth every evening. 30 tablet 2     No current facility-administered medications for this visit.

## 2025-04-29 NOTE — PATIENT INSTRUCTIONS
Start taking donepezil 5 mg at bedtime now.     Possible side effects include nausea, diarrhea, cramps or abnormal dreams. Let me know if you have any issues with this new medication.     Will need an EKG in 1-2 weeks to ensure no change in heart rhythm since you also take metoprolol.     May recommend that you discuss reducing the dose of metoprolol with your cardiologist given the low BP and fatigue.     Let me know if you would like a referral to the [facility] as we discussed     Follow up with me in about 3 months     Please call our clinic at [phone] or send a message on the [employer] portal if there are any changes to the plan discussed today. For example, if you are not contacted for the requested tests, referral(s) within one week, if you are unable to receive the medications prescribed, or if you feel you need to change the treatment course for any reason.

## 2025-05-09 ENCOUNTER — TELEPHONE (OUTPATIENT)
Dept: FAMILY MEDICINE | Facility: CLINIC | Age: 80
End: 2025-05-09
Payer: MEDICARE

## 2025-05-09 NOTE — TELEPHONE ENCOUNTER
"----- Message from Tamia sent at 5/8/2025  4:52 PM CDT -----  Contact: Patient  Type:  Patient Returning CallWho Called:PatientWho Left Message for Patient:Unknown Does the patient know what this is regarding?:Yes, ' Quality of service "Would the patient rather a call back or a response via [employer]? [facility] Call Back Number:[phone] Information: Patient received a msg regarding the quality of service he's received. Please advise  "

## 2025-06-12 ENCOUNTER — TELEPHONE (OUTPATIENT)
Dept: NEUROLOGY | Facility: CLINIC | Age: 80
End: 2025-06-12
Payer: MEDICARE

## 2025-06-12 NOTE — TELEPHONE ENCOUNTER
Left message to call in regards to the appt that is scheduled on 8/4/25. Miladys will be out of the office that day and the appt needs to be rescheduled.

## 2025-08-04 ENCOUNTER — TELEPHONE (OUTPATIENT)
Dept: NEUROLOGY | Facility: CLINIC | Age: 80
End: 2025-08-04
Payer: MEDICARE

## 2025-08-06 ENCOUNTER — LAB VISIT (OUTPATIENT)
Dept: LAB | Facility: HOSPITAL | Age: 80
End: 2025-08-06
Attending: UROLOGY
Payer: MEDICARE

## 2025-08-06 ENCOUNTER — OFFICE VISIT (OUTPATIENT)
Dept: NEUROLOGY | Facility: CLINIC | Age: 80
End: 2025-08-06
Payer: MEDICARE

## 2025-08-06 VITALS
WEIGHT: 193.88 LBS | BODY MASS INDEX: 25.69 KG/M2 | DIASTOLIC BLOOD PRESSURE: 57 MMHG | RESPIRATION RATE: 14 BRPM | HEIGHT: 73 IN | SYSTOLIC BLOOD PRESSURE: 96 MMHG | HEART RATE: 55 BPM

## 2025-08-06 DIAGNOSIS — G30.9 ALZHEIMER'S DISEASE: ICD-10-CM

## 2025-08-06 DIAGNOSIS — G31.84 MILD NEUROCOGNITIVE DISORDER: Primary | ICD-10-CM

## 2025-08-06 DIAGNOSIS — F02.80 ALZHEIMER'S DISEASE: ICD-10-CM

## 2025-08-06 DIAGNOSIS — C61 PROSTATE CANCER: ICD-10-CM

## 2025-08-06 LAB — PSA SERPL-MCNC: <0.01 NG/ML

## 2025-08-06 PROCEDURE — 36415 COLL VENOUS BLD VENIPUNCTURE: CPT | Mod: PN

## 2025-08-06 PROCEDURE — 99213 OFFICE O/P EST LOW 20 MIN: CPT | Mod: PBBFAC,PO | Performed by: NURSE PRACTITIONER

## 2025-08-06 PROCEDURE — 99999 PR PBB SHADOW E&M-EST. PATIENT-LVL III: CPT | Mod: PBBFAC,,, | Performed by: NURSE PRACTITIONER

## 2025-08-06 PROCEDURE — 84153 ASSAY OF PSA TOTAL: CPT

## 2025-08-06 RX ORDER — DONEPEZIL HYDROCHLORIDE 10 MG/1
10 TABLET, FILM COATED ORAL NIGHTLY
Qty: 90 TABLET | Refills: 0 | Status: SHIPPED | OUTPATIENT
Start: 2025-08-06 | End: 2025-11-04

## 2025-08-06 NOTE — PATIENT INSTRUCTIONS
Increase donepezil to 10 mg (goal dose). Continue taking it in the AM to help with the bad dreams. If you have any other issues with the dose, like diarrhea or nausea with the increase, can reduce back to prior dose of 5 mg     Try heavy eating utensils / drinkware or a Velcro wrist weight to try to help tremor. Can consider medication for tremor in the future if it worsens.     Sleep could stand to be improved.   Taking melatonin at a set bedtime nightly may help with maintaining sleep and also with the dreaming issue.   Magnesium may be useful.    - Start Magnesium in ONE of the following preparations -               1. Magnesium oxide 800mg nightly (the most common over the counter kind, may causes loose stools)              2. Magnesium citrate 400-500mg nightly (harder to find, but more neutral on the bowels)              3. Magnesium glycinate 400mg nightly (hardest to find, look online, but most bowel-neutral, best absorbed)       While memory loss may not be reversible in many cases, we do know that there are several steps that you can take to prevent further memory loss:  Control of chronic vascular risk factors:  For longevity and cognitive health, you should work closely with your PCP and other specialists to aggressively control vascular risk factors, including hypertension, hyperlipidemia, obesity and diabetes.   Cigarette smoking and other tobacco use greatly increase long term vascular risk (risk for heart attacks, strokes, or other vascular diseases).       Diet:  We recommend the MIND diet, a combination of two healthy diets: the Mediterranean diet and the DASH (Dietary Approaches to Stop Hypertension) diet. It includes a variety of brain-friendly foods to optimize cognitive health and longevity. This diet typically includes fresh fruits/vegetables, whole grains, fish and poultry.   Research indicates certain nutrients may aid in brain function, such as B vitamins (especially B6, B12, and folic  acid), antioxidants (such as vitamins C and E, and beta carotene), and Omega-3 fatty acids.  Minimize or eliminate the use of alcohol     Medications:  Discuss any prescription or over the counter medications you might be taking with your doctor to avoid those that can cause sedation or worsen cognitive function, such as sleep aids, benzodiazepines, and antihistamines.     Socialization and Exercise:  30-45 minutes of brisk physical activity 5 days/week has been shown to improve function in vascular dementias, lower the risk of stroke and slow the progression of memory loss  Activities that are engaging or mentally stimulating, such as word puzzles, jigsaw puzzles and Sudoku, are also beneficial to cognitive health  Regular interaction with friends, family and community are also known to be helpful.     Sleep:  Establish a regular, consistent sleep pattern and practice good sleep hygiene.    Avoid screen time (computer, TV, smartphones or tablets) or heavy meals for at least an hour before bedtime.   Avoid caffeine or stimulants after 2 PM.   Exercise earlier in the day or mornings and keep your sleeping environment comfortable. Bedtime and wake-up times should be consistent every night and morning so the body becomes used to a single routine, even on the weekends.   Having a wind down routine (e.g., soft lights in the house, bath before bed, reduced fluid intake, songs, reading, less noise) also helps to promote sleep readiness.   Untreated obstructive sleep apnea can lead to an increased risk for stroke and further memory loss      STRATEGIES TO COPE WITH YOUR COGNITIVE DEFICITS:  Pay Attention!  Reduce distractions in the area  Look at the person speaking to you & paraphrase what they are saying  Write down important things  Ask them to repeat themselves if you zone out  Ask people to simplify or reduce information if you need to  Processing Speed  Using multiple methods to learn new information, such as  listening, taking notes, and recording, can be helpful  Give yourself enough time to complete certain tasks to reduce frustration  Executive Functioning  Don't try to multi-task. Do tasks separately to ensure that each gets completed.   Use a calendar or planner to keep you on track  Write down steps to complicated tasks in case you forget  Storing Information  Immediately after you learn something, try to recall it. Repeat this and gradually lengthen the interval between your recalls  Recalling information   Jog your memory! If you loose something, try to think back to when you last had it. Mentally walk yourself through the steps of your day to prod your memory.   Use clues, timers, memos, notes, etc.  Stay organized - keep your keys in a certain place, put your important papers in a certain place, etc.   Having a routine helps to anchor memories as well    Follow up with me in 6 months or sooner if needed     Please call our clinic at [phone] or send a message on the [facility] portal if there are any changes to the plan discussed today. For example, if you are not contacted for the requested tests, referral(s) within one week, if you are unable to receive the medications prescribed, or if you feel you need to change the treatment course for any reason.

## 2025-08-06 NOTE — PROGRESS NOTES
Caregiver name: Mary  Relationship to the patient: wife   Does the patient have a living will? Yes  Does the patient have a designated healthcare POA? Yes  Does the patient have a designated general POA? Yes    Have educational materials/resources been provided? No      Activities of Daily Living    Bathing: Independent  Dressing: Independent  Grooming: Independent  Mouth Care: Independent  Toileting: Independent  Transferring Bed/Chair: Independent  Walking: Independent  Climbing: Independent  Eating: Independent      Instrumental Activities of Daily Living    Shopping: Independent  Cooking: Independent  Managing Medications: Needs Help  Using the phone and looking up numbers: Independent  Doing Housework: Needs Help  Doing Laundry: Independent  Driving or using public transportation: Independent  Managing finances: Needs Help    Functional Assessment Staging  2   Complains of forgetting location of objects. Subjective work difficulties.             8/6/2025     1:39 PM 1/10/2025     8:08 AM 9/3/2024     1:10 PM 11/14/2023    11:14 AM 10/24/2023     1:06 PM 7/25/2023     3:04 PM 10/7/2022    11:04 AM   Depression Patient Health Questionnaire   Over the last two weeks how often have you been bothered by little interest or pleasure in doing things Several days Not at all Not at all Not at all Several days Not at all Not at all    Over the last two weeks how often have you been bothered by feeling down, depressed or hopeless Several days Several days Nearly every day Not at all Several days Not at all Not at all   PHQ-2 Total Score 2 1 3 0 2 0 0       Data saved with a previous flowsheet row definition

## 2025-08-06 NOTE — ASSESSMENT & PLAN NOTE
Increase donepezil to goal 10 mg   Add melatonin / Mg++ at bedtime  Improve sleep hygiene   Discussed non pharm interventions to help tremor - weighted items, wrist weight  Due for updated TSH per PCP     Cognition and function were assessed as documented above. The functional assessment staging test (FAST) score is 4. The patient is felt to have decision making capacity. Medications were reconciled and reviewed for high-risk medications. The patient's behavior and psychiatric health were reviewed and addressed. PHQ-2 score was 0. The patient and family were counseled on safety in the home and with regards to the operation of vehicles. Discussed caregiver needs and social support. Advance Care Plan was reviewed. Written care plan and support information provided to the patient and/or caregiver.

## 2025-08-06 NOTE — PROGRESS NOTES
NEUROLOGY  Outpatient Visit     [facility]  [address] [facility], [place] [address]  [phone] (office) / [phone] (fax)    Patient Name:  Peter Caldwell  :  1945  MR #:  [id number]  Acct #:  [id number]    Date of  Visit: 2025    Other Physicians:  Ricky Delaney MD (Primary Care Physician)      CHIEF COMPLAINT: Memory Loss (Follow up / ADLs )    Interval history:  25:  Peter Caldwell is a 79 y.o. R-handed male seen in f/u for MCI    Medical history is significant for depression, anxiety, hearing loss, CAD s/p CABG, HTN, AAA without rupture, aortoiliac atherosclerosis, BPH, melanoma of skin, prostate cancer    Last visit was 2025. Initiated donepezil. Initially, he had nightmares and diarrhea. He recently starting taking the medication in the AM to help this. The diarrhea has resolved.     They feel that his memory is stable since last visit. He still forgets why he went into a room, for example. Wife notes perhaps mild improvement. He definitely is not as fatigued. Wife notes a habit of adding things to his sugary beverages that has been fairly chronic. He is still enjoying coffee beverages quite a bit. Recently started a family history research project. He is very engaged in this and enjoying it.     The hand tremors have improved. He still has some issues with fine motor, like typing on laptop. Not interfering with daily life.     Not sleeping well as prior. Poor sleep hygiene. Revisited sleep testing. He would not pursue any treatment if he did have ELINA. Has had psychiatry follow up. Still reports emotional lability related to family events, but appreciates both good and bad. Back to 200 mg of sertraline.     Meeting with audiology soon to update hearing aids.     Doing home exercises and stretches. No recent falls.     Home BP monitoring with normal results. Relatively low today with mild bradycardia.     25:  Peter Caldwell is a 79 y.o. R-handed male  seen in f/u for memory loss    Medical history is significant for depression, anxiety, hearing loss, CAD s/p CABG, HTN, AAA without rupture, aortoiliac atherosclerosis, BPH, melanoma of skin, prostate cancer    Here today with wife, Dee     MRI brain showed minimal vascular disease and redemonstrated global atrophy.     NP testing -- difficulty with processing speed, executive functioning, motor speed, and learning and memory, with variable improvement with recognition formats. Report of cognitive changes with independence in activities of daily living suggests a diagnosis of mild neurocognitive disorder. Overall information is suggestive of a neurodegenerative process, with concern for an Alzheimer's process that would not fully explain the parkinsonian findings.    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 Completed PT, trying to do HEP. No falls.   [facility] Also seeing [facility] via [facility], wants to give that one more try (bad experience with telehealth but liked provider).   Taking 150 mg sertraline due to SE from 200 mg dose.   BP always relatively low. Enrolled in digital HTN. Takes BB since CABG.     HISTORY OF PRESENT ILLNESS:  Peter Caldwell is a 79 y.o. R-handed male seen in consultation for memory loss per Self, Aaareferral    Medical history is significant for depression, anxiety, hearing loss, CAD s/p CABG, HTN, AAA without rupture, aortoiliac atherosclerosis, BPH, melanoma of skin, prostate cancer    Here today with wife, Dee   Retired from [place] [employer], PhD level education. Retired [employer].   They between Nacho & Juni, primary is Nacho     He notes ST memory loss, like forgetting where he places items or what he is supposed to do on a given day. He has trouble recalling names / words, very frustrating. Hard to follow plots on shows. Easily looses train of thought, notes executive  "dysfunction. Very meticulous, keeps detailed records of all appts, writes to do list daily. Manages his own meds, etc. He drives without difficulty. Has never had good sense of direction. He does note VS issues, like when parking his car.     Onset was approximately 2 years ago, but he feels that it is worse within the past year.     Feels off balance. Not falling. Doing PT now for this. No dizziness or neuropathy. Harder getting in or out of a car, rising from a chair. No sig gait change, like shuffling. When getting dressed, hard to stand on one leg.     Also notes tremor. Feels that fine motor skills are affected. In both hands, more prominent on the L. Has had episodes when he wakes and his L hand is shaking vigorously. Handwriting is shaky or messy, he has to focus hard when writing or buttoning. Worse with anxiety.     Recently notes pills getting stuck, no other dysphagia. No voice change. Hearing loss is chronic. Has hearing aids, doesn't like them. Knows that he needs to try again.     Mood has been labile, like a "roller coaster." Difficultly navigating aging process. Used to travel often and now its harder as he ages. [facility]. Last visit in 11/2024. Increased Zoloft, but made lethargy worse. Has been seeing [facility] providers and on sertraline for many years for anxiety and depression. He collects and uses fidget toys "obsessively." Wife notes that he has always been "obsessive compulsive." It helps him stay calm. Has always loved to eat sweets, no change in preferences. He has not had any hallucinations.     Poor quality of sleep with bizarre dreams. Self diagnosed as PTSD from his past career. Dreams about past work in admin. Chronic, but becoming more intense. No dream reenactment. EMR has ELINA, but he has never had sleep testing. He snores some. Sleeps MN - 11 am typically. 3-4 times up to urinate.   Fatigued, low energy, naps.     Treated for prostate cancer and now struggling with " incontinence since.     No known FH of ND disease. Younger brother was schizophrenic.     Non smoker. ETOH during his time in the service, but no regular ETOH use since then.     Daily MV, compliant with Rx. ASA for CAD, no hx of CVA/TIA     Does have long term anosmia.     Allergies:  Review of patient's allergies indicates:   Allergen Reactions    Oxycodone Other (See Comments)    Penicillins      Other reaction(s): Rash    Promethazine Other (See Comments)     paranoia    Percodan [oxycodone hcl-oxycodone-asa]      Sleep / nightmare problems         Current Medications:  Current Medications[1]    Past Medical History:  Past Medical History:   Diagnosis Date    AAA (abdominal aortic aneurysm)     Alzheimer's disease 8/6/2025    Arthritis     BPH (benign prostatic hyperplasia)     Cataract     Colon polyps 2006    Coronary artery disease     Depression     Ex-smoker     Hx of psychiatric care     Lexapro, valium    Mood disorder     chalasani    Old MI (myocardial infarction) 10/07/2022    ELINA (obstructive sleep apnea)     Osteopenia 4/15 aylin 4/20    Other insomnia 01/07/2019    Parkinsonism, unspecified 1/10/2025    Prostate cancer 03/12/2020    S/P CABG (coronary artery bypass graft) 08/09/2013    S/P PTCA (percutaneous transluminal coronary angioplasty) 08/09/2013    Skin cancer 05/10/2023    Therapy        Past Surgical History:  Past Surgical History:   Procedure Laterality Date    BIOPSY WITH TRANSRECTAL ULTRASOUND (TRUS) GUIDANCE N/A 05/13/2021    Procedure: BIOPSY, WITH TRANSRECTAL US GUIDANCE/ URONAV;  Surgeon: Pawel Salinas MD;  Location: [address];  Service: Urology;  Laterality: N/A;  30 min    CATARACT EXTRACTION W/  INTRAOCULAR LENS IMPLANT Left 07/26/2016    Dr. Norman    CATARACT EXTRACTION W/  INTRAOCULAR LENS IMPLANT Right 08/09/2016    Dr. Norman    CHOLECYSTECTOMY      COLONOSCOPY N/A 01/19/2023    Procedure: COLONOSCOPY;  Surgeon: Zachery Ramos MD;  Location: [facility];   "Service: Endoscopy;  Laterality: N/A;    CORONARY ANGIOPLASTY      CORONARY ARTERY BYPASS GRAFT      HERNIA REPAIR      PROSTATE SURGERY  March 2019    Prostate biopsy    ptca      rca    VASECTOMY  1986?       Family History:  family history includes Coronary artery disease in his father and mother; Depression in his mother; Diabetes in his mother; Early death in his brother; Heart disease in his brother, brother, brother, father, and mother; Mental illness in his brother; Schizophrenia in his brother.    Social History:   reports that he quit smoking about 54 years ago. His smoking use included cigarettes. He started smoking about 65 years ago. He has a 1.4 pack-year smoking history. He has never used smokeless tobacco. He reports that he does not currently use alcohol after a past usage of about 1.0 standard drink of alcohol per week. He reports that he does not use drugs.      REVIEW OF SYSTEMS:  As per HPI    PHYSICAL EXAM:  BP (!) 96/57 (BP Location: Right arm, Patient Position: Sitting)   Pulse (!) 55   Resp 14   Ht 6' 1" (1.854 m)   Wt 87.9 kg (193 lb 14.3 oz)   BMI 25.58 kg/m²     General: Well groomed. No acute distress.  Pulmonary: Normal effort and rate.   Musculoskeletal: No obvious joint deformities, moves all extremities well.  Extremities: No clubbing, cyanosis or edema.     Neurological Exam  Mental Status  Awake and alert. Oriented to person, place, time and situation. Recalls 3 of 3 objects immediately. At 5 minutes recalls 0 of 3 objects. Recalls 0 of 3 objects with prompting. Able to copy figure. Speech is normal. Able to name objects, repeat, read and write. Follows three-step commands. Able to perform serial calculations. Fund of knowledge is appropriate for level of education. MMSE score: 26.    Cranial Nerves  CN II: Right visual acuity: Finger movement. Left visual acuity: Finger movement.  CN III, IV, VI: Extraocular movements intact bilaterally. Normal lids and orbits " bilaterally.  CN V:  Right: Facial sensation is normal.  Left: Facial sensation is normal on the left.  CN VII:  Right: There is no facial weakness.  Left: There is no facial weakness.  CN XI: Shoulder shrug strength is normal.  CN XII: Tongue midline without atrophy or fasciculations.    Motor  Normal muscle bulk throughout. No fasciculations present. Paratonic . No abnormal involuntary movements. Strength is 5/5 throughout all four extremities.  No drift .    Sensory  Light touch is normal in upper and lower extremities.     Coordination  Right: Finger-to-nose normal. Rapid alternating movement normal.Left: Finger-to-nose normal. Rapid alternating movement normal.  BUE tremor with intent on FNF   None at rest.    Gait  Casual gait is normal including stance, stride, and arm swing. Able to rise from chair without using arms.  Semi wide base  Normal stride, swing   No instability .    * Specialized movement exam Gen: no masked faces, normal blink  Speech: mild hypophonic  Tremor:   BUE with action - mod   None at rest   Bradykinesia:  no  Tone: paratonic   Gait:  Able to rise without arms first attempt  Normal posture  3 step turn, no festination or freezing   Normal stride, semi wide base  Ok arm swing bilat, no tremor   Pull Test: deferred         DIAGNOSTIC DATA:  I have personally reviewed provider notes, labs and imaging made available to me today.     Imaging:  Results for orders placed during the hospital encounter of 03/06/25    MRI Brain Without Contrast        Narrative  EXAM:  MRI BRAIN WITHOUT CONTRAST    CLINICAL HISTORY:  Memory loss;Parkinsonian syndrome; Other amnesia.    COMPARISON:  Head CT dated 10/15/2024    FINDINGS:  Intracranial contents:There is no acute abnormality.  There is no intracranial hemorrhage.  There is no mass.  There are no regions of restricted diffusion to suggest acute infarction.  There is generalized volume loss with prominence of the extra-axial spaces.  Volume loss is  "global but may be more prominent in the temporal lobes bilaterally.  There is no hydrocephalus or midline shift.  There is only very minimal periventricular white matter FLAIR hyperintense signal.  These changes likely reflect sequelae of minimal chronic small vessel disease.  The basilar cisterns are open.  Flow voids indicating patency are present in the major vessels at the base of the brain.  The cerebellar tonsils are normal position.  Sellar structures are normal.    The patient has had bilateral lens replacement surgery.  The orbits are otherwise grossly normal.    Extracranial contents, calvarium, soft tissues:Baseline marrow signal is mildly heterogeneous but nonspecific.  There is trace mucosal thickening in the ethmoid air cells.  Otherwise, the paranasal sinuses and mastoid air cells are clear.    Impression  1. There is no acute abnormality.  There is global volume loss with only minimal nonspecific white matter change.  There is no hemorrhage, mass or acute infarction.  There is no change compared to the prior CT.      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 He also reported fluctuating mood in the context of longstanding treatment for anxiety and increase in use of fidget toys. He is independent in basic and instrumental activities of daily living. Workup has suggested possible temporal atrophy and elevated pTau 181.     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 His premorbid intellectual abilities were estimated to be in the high average range. Basic auditory attention and working memory were largely intact, with the exception of reduced digit sequencing. Visuomotor and oral processing speed performances were " "below expectation, possibly due, in part, to a precise and measured approach to drawing tasks. He demonstrated difficulty on tasks of set-shifting and problem-solving, with reduced letter and category fluency. Naming was intact, with intact reading. Visual perception and block construction were intact. Drawing was intact when copying a complex figure, with some errors when copying simple figures. Learning, recall, and recognition of a word list were reduced. Learning and recall of stories was relatively reduced, with intact recognition. Visual learning, recall, and recognition were reduced. Fine motor speed and dexterity were reduced bilaterally. He endorsed mild depression and moderate anxiety.     In sum, despite variable performance validity, there is concern for cognitive change. Patterns of performance were mixed, with difficulty with processing speed, executive functioning, motor speed, and learning and memory, with variable improvement with recognition formats. Report of cognitive changes with independence in activities of daily living suggests a diagnosis of mild neurocognitive disorder. Overall information is suggestive of a neurodegenerative process, with concern for an Alzheimer's process that would not fully explain the parkinsonian findings. Additional workup is recommended. Symptoms of anxiety and depression and poor sleep are also likely contributing."    Cardiac:  Results for orders placed or performed in visit on 10/07/22   IN OFFICE EKG 12-LEAD (to [place])    Collection Time: 10/07/22 11:04 AM    Narrative    Test Reason : I10,I25.10,Z95.1,    Vent. Rate : 057 BPM     Atrial Rate : 057 BPM     P-R Int : 190 ms          QRS Dur : 078 ms      QT Int : 446 ms       P-R-T Axes : 004 -14 057 degrees     QTc Int : 434 ms    Sinus bradycardia  Otherwise normal ECG  When compared with ECG of 10-MAY-2021 10:23,  No significant change was found  Confirmed by Asif Sexton JR., MD ([id number]) on 10/7/2022 " "11:53:31 AM    Referred By: [email or web address]   SELF           Confirmed By:Asif Sexton       Labs:  Lab Results   Component Value Date    WBC 6.95 02/20/2025    HGB 14.0 02/20/2025    HCT 44.4 02/20/2025     02/20/2025    MCV 96 02/20/2025    RDW 14.3 02/20/2025     Lab Results   Component Value Date     02/20/2025    K 4.6 02/20/2025     02/20/2025    CO2 25 02/20/2025    BUN 9 02/20/2025    CREATININE 1.1 02/20/2025    GLU 81 02/20/2025    CALCIUM 9.5 02/20/2025     Lab Results   Component Value Date    PROT 6.6 02/20/2025    ALBUMIN 3.8 02/20/2025    BILITOT 0.6 02/20/2025    AST 40 02/20/2025    ALKPHOS 61 02/20/2025    ALT 32 02/20/2025     Lab Results   Component Value Date    INR 1.0 06/02/2009     Lab Results   Component Value Date    CHOL 122 09/16/2024    HDL 31 (L) 09/16/2024    LDLCALC 66.8 09/16/2024    TRIG 121 09/16/2024    CHOLHDL 25.4 09/16/2024     No results found for: "LABA1C", "HGBA1C"   No results found for: "ZXMKGISO40"  No results found for: "FOLATE"  Lab Results   Component Value Date    TSH 5.202 (H) 02/20/2025     Outside labs 3/2024  A1C 5.7  Vit D 42  LDL 67  RPR NR  B12 463  Folate > 20     Component      Latest Ref Rng 2/20/2025   HIV 1/2 Ag/Ab      Non-reactive  Non-reactive    Methlymalonic Acid      <0.40 umol/L 0.22    Neurofilament Light Chain, Plasma      <=42.1 pg/mL 19.1    Phospho-Tau (181P)      0.00 - 0.97 pg/mL 1.08 (H)    Free T4      0.71 - 1.51 ng/dL 0.55 (L)    Treponema Pallidum Antibodies (IgG, IgM)      Nonreactive  Nonreactive       Legend:  (H) High  (L) Low    ASSESSMENT & PLAN:  Peter Caldwell is a 79 y.o. R-handed male seen in f/u for memory loss.     Problem List Items Addressed This Visit          Neuro    Mild neurocognitive disorder - Primary    Overview   MOCA  22/30 Feb 2025    MMSE  26/30 Aug 2025    NP 4/2025 - MCI, suspected AD +/- other factors   MRI brain 3/2025 - global atrophy, minimal microangiopathy  Biomarkers:  Synone NEG "   + p tau 181, - NFL         Current Assessment & Plan   Increase donepezil to goal 10 mg   Add melatonin / Mg++ at bedtime  Improve sleep hygiene   Discussed non pharm interventions to help tremor - weighted items, wrist weight  Due for updated TSH per PCP     Cognition and function were assessed as documented above. The functional assessment staging test (FAST) score is 4. The patient is felt to have decision making capacity. Medications were reconciled and reviewed for high-risk medications. The patient's behavior and psychiatric health were reviewed and addressed. PHQ-2 score was 0. The patient and family were counseled on safety in the home and with regards to the operation of vehicles. Discussed caregiver needs and social support. Advance Care Plan was reviewed. Written care plan and support information provided to the patient and/or caregiver.           Alzheimer's disease       Follow up: 6 months for memory         I appreciate the opportunity to participate in the care of this patient. Please feel free to contact me with any concerns or questions.       Miladys Atkins, [facility]  [facility]  [address] [place] [address]             [1]   Current Outpatient Medications   Medication Sig Dispense Refill    aspirin (ECOTRIN) 81 MG EC tablet Take 81 mg by mouth every evening. MD Sylvia Pisano, RN; P Brad DASILVA Staff  Caller: Unspecified (Today,  2:44 PM)  As per notes no need to hold ASA.   If he can stop it that is fine. Likely less hematuria after biopsy but not required      atorvastatin (LIPITOR) 80 MG tablet TAKE 1 TABLET(80 MG) BY MOUTH EVERY NIGHT 90 tablet 3    dutasteride (AVODART) 0.5 mg capsule TAKE 1 CAPSULE(0.5 MG) BY MOUTH EVERY DAY 30 capsule 11    ezetimibe (ZETIA) 10 mg tablet TAKE 1 TABLET(10 MG) BY MOUTH EVERY DAY 90 tablet 3    fluticasone propionate (FLONASE) 50 mcg/actuation nasal spray INHALE 1 SPRAY IN EACH NOSTRIL EVERY DAY  32 g 2    ibuprofen (ADVIL,MOTRIN) 200 MG tablet Take 200 mg by mouth as needed for Pain. Hold 1 week prior to surgery      levothyroxine (SYNTHROID) 50 MCG tablet Take 1 tablet (50 mcg total) by mouth before breakfast. 90 tablet 1    metoprolol succinate (TOPROL-XL) 25 MG 24 hr tablet TAKE 1 TABLET(25 MG) BY MOUTH EVERY EVENING 90 tablet 4    multivit with minerals/lutein (MULTIVITAMIN 50 PLUS ORAL) Take 1 tablet by mouth once daily.      sertraline (ZOLOFT) 100 MG tablet TAKE 2 TABLETS(200 MG) BY MOUTH EVERY  tablet 2    vit A/vit C/vit E/zinc/copper (ICAPS AREDS ORAL) Take 1 capsule by mouth once daily.      donepeziL (ARICEPT) 10 MG tablet Take 1 tablet (10 mg total) by mouth every evening. 90 tablet 0    silodosin (RAPAFLO) 8 mg Cap capsule Take 1 capsule (8 mg total) by mouth once daily. 30 capsule 11     No current facility-administered medications for this visit.

## 2025-08-13 ENCOUNTER — TELEPHONE (OUTPATIENT)
Dept: UROLOGY | Facility: CLINIC | Age: 80
End: 2025-08-13
Payer: MEDICARE

## 2025-08-14 ENCOUNTER — OFFICE VISIT (OUTPATIENT)
Dept: UROLOGY | Facility: CLINIC | Age: 80
End: 2025-08-14
Payer: MEDICARE

## 2025-08-14 VITALS
HEIGHT: 73 IN | WEIGHT: 192.88 LBS | BODY MASS INDEX: 25.56 KG/M2 | DIASTOLIC BLOOD PRESSURE: 57 MMHG | SYSTOLIC BLOOD PRESSURE: 99 MMHG | HEART RATE: 55 BPM

## 2025-08-14 DIAGNOSIS — N40.1 BENIGN PROSTATIC HYPERPLASIA WITH URINARY OBSTRUCTION: ICD-10-CM

## 2025-08-14 DIAGNOSIS — R39.15 URINARY URGENCY: Primary | ICD-10-CM

## 2025-08-14 DIAGNOSIS — N13.8 BENIGN PROSTATIC HYPERPLASIA WITH URINARY OBSTRUCTION: ICD-10-CM

## 2025-08-14 DIAGNOSIS — C61 PROSTATE CANCER: ICD-10-CM

## 2025-08-14 PROCEDURE — 51798 US URINE CAPACITY MEASURE: CPT | Mod: PBBFAC

## 2025-08-14 PROCEDURE — 99999 PR PBB SHADOW E&M-EST. PATIENT-LVL IV: CPT | Mod: PBBFAC,,,

## 2025-08-14 PROCEDURE — 99214 OFFICE O/P EST MOD 30 MIN: CPT | Mod: PBBFAC

## 2025-08-14 PROCEDURE — 99999PBSHW PR PBB SHADOW TECHNICAL ONLY FILED TO HB: Mod: PBBFAC,,,

## 2025-08-14 RX ORDER — MIRABEGRON 25 MG/1
25 TABLET, FILM COATED, EXTENDED RELEASE ORAL DAILY
Qty: 30 TABLET | Refills: 11 | Status: SHIPPED | OUTPATIENT
Start: 2025-08-14 | End: 2026-08-14

## (undated) DEVICE — CONTAINER FORMALIN PREFILLED

## (undated) DEVICE — SEE MEDLINE ITEM 152186

## (undated) DEVICE — NDL BIOPSY 18G 20CM DISP

## (undated) DEVICE — GUIDE BIOPSY BIPLANAR 18G

## (undated) DEVICE — COVER TRANSDUCER LATEX N/STERI